# Patient Record
Sex: MALE | Race: WHITE | NOT HISPANIC OR LATINO | Employment: OTHER | URBAN - NONMETROPOLITAN AREA
[De-identification: names, ages, dates, MRNs, and addresses within clinical notes are randomized per-mention and may not be internally consistent; named-entity substitution may affect disease eponyms.]

---

## 2017-01-02 ENCOUNTER — HISTORY (OUTPATIENT)
Dept: INTERNAL MEDICINE | Facility: OTHER | Age: 62
End: 2017-01-02

## 2017-01-02 ENCOUNTER — OFFICE VISIT - GICH (OUTPATIENT)
Dept: INTERNAL MEDICINE | Facility: OTHER | Age: 62
End: 2017-01-02

## 2017-01-02 DIAGNOSIS — R10.9 ABDOMINAL PAIN: ICD-10-CM

## 2017-01-02 LAB
BILIRUB UR QL: NEGATIVE
CLARITY, URINE: CLEAR CLARITY
COLOR UR: YELLOW COLOR
GLUCOSE URINE: NEGATIVE MG/DL
KETONES UR QL: NEGATIVE MG/DL
LEUKOCYTE ESTERASE URINE: NEGATIVE
NITRITE UR QL STRIP: NEGATIVE
OCCULT BLOOD,URINE - HISTORICAL: NEGATIVE
PH UR: 5 [PH]
PROTEIN QUALITATIVE,URINE - HISTORICAL: NEGATIVE MG/DL
SP GR UR STRIP: 1.02
UROBILINOGEN,QUALITATIVE - HISTORICAL: NORMAL EU/DL

## 2017-01-04 ENCOUNTER — HOSPITAL ENCOUNTER (OUTPATIENT)
Dept: RADIOLOGY | Facility: OTHER | Age: 62
End: 2017-01-04
Attending: INTERNAL MEDICINE

## 2017-01-04 DIAGNOSIS — R10.9 ABDOMINAL PAIN: ICD-10-CM

## 2017-01-10 ENCOUNTER — COMMUNICATION - GICH (OUTPATIENT)
Dept: FAMILY MEDICINE | Facility: OTHER | Age: 62
End: 2017-01-10

## 2017-01-10 DIAGNOSIS — N13.9 OBSTRUCTIVE AND REFLUX UROPATHY: ICD-10-CM

## 2017-01-11 ENCOUNTER — HISTORY (OUTPATIENT)
Dept: UROLOGY | Facility: OTHER | Age: 62
End: 2017-01-11

## 2017-01-11 ENCOUNTER — OFFICE VISIT - GICH (OUTPATIENT)
Dept: UROLOGY | Facility: OTHER | Age: 62
End: 2017-01-11

## 2017-01-11 DIAGNOSIS — N13.9 OBSTRUCTIVE AND REFLUX UROPATHY: ICD-10-CM

## 2017-01-25 ENCOUNTER — COMMUNICATION - GICH (OUTPATIENT)
Dept: SURGERY | Facility: OTHER | Age: 62
End: 2017-01-25

## 2017-01-25 ENCOUNTER — HISTORY (OUTPATIENT)
Dept: SURGERY | Facility: OTHER | Age: 62
End: 2017-01-25

## 2017-01-25 ENCOUNTER — OFFICE VISIT - GICH (OUTPATIENT)
Dept: SURGERY | Facility: OTHER | Age: 62
End: 2017-01-25

## 2017-01-25 DIAGNOSIS — K80.10 CALCULUS OF GALLBLADDER WITH CHRONIC CHOLECYSTITIS WITHOUT OBSTRUCTION: ICD-10-CM

## 2017-01-25 LAB
A/G RATIO - HISTORICAL: 1.7 (ref 1–2)
ABSOLUTE BASOPHILS - HISTORICAL: 0.1 THOU/CU MM
ABSOLUTE EOSINOPHILS - HISTORICAL: 0 THOU/CU MM
ABSOLUTE LYMPHOCYTES - HISTORICAL: 2.3 THOU/CU MM (ref 0.9–2.9)
ABSOLUTE MONOCYTES - HISTORICAL: 0.4 THOU/CU MM
ABSOLUTE NEUTROPHILS - HISTORICAL: 1.6 THOU/CU MM (ref 1.7–7)
ALBUMIN SERPL-MCNC: 4.4 G/DL (ref 3.5–5.7)
ALP SERPL-CCNC: 31 IU/L (ref 34–104)
ALT (SGPT) - HISTORICAL: 16 IU/L (ref 7–52)
AMYLASE SERPL-CCNC: 73 IU/L (ref 29–103)
ANION GAP - HISTORICAL: 8 (ref 5–18)
AST SERPL-CCNC: 26 IU/L (ref 13–39)
BASOPHILS # BLD AUTO: 1.5 %
BILIRUB SERPL-MCNC: 0.4 MG/DL (ref 0.3–1)
BUN SERPL-MCNC: 20 MG/DL (ref 7–25)
BUN/CREAT RATIO - HISTORICAL: 27
CALCIUM SERPL-MCNC: 9.9 MG/DL (ref 8.6–10.3)
CHLORIDE SERPLBLD-SCNC: 101 MMOL/L (ref 98–107)
CO2 SERPL-SCNC: 30 MMOL/L (ref 21–31)
CREAT SERPL-MCNC: 0.74 MG/DL (ref 0.7–1.3)
EOSINOPHIL NFR BLD AUTO: 0.4 %
ERYTHROCYTE [DISTWIDTH] IN BLOOD BY AUTOMATED COUNT: 12.3 % (ref 11.5–15.5)
GFR IF NOT AFRICAN AMERICAN - HISTORICAL: >60 ML/MIN/1.73M2
GLOBULIN - HISTORICAL: 2.6 G/DL (ref 2–3.7)
GLUCOSE SERPL-MCNC: 100 MG/DL (ref 70–105)
HCT VFR BLD AUTO: 44 % (ref 37–53)
HEMOGLOBIN: 14.4 G/DL (ref 13.5–17.5)
LYMPHOCYTES NFR BLD AUTO: 53.4 % (ref 20–44)
MCH RBC QN AUTO: 32.5 PG (ref 26–34)
MCHC RBC AUTO-ENTMCNC: 32.7 G/DL (ref 32–36)
MCV RBC AUTO: 99 FL (ref 80–100)
MONOCYTES NFR BLD AUTO: 9.1 %
NEUTROPHILS NFR BLD AUTO: 35.7 % (ref 42–72)
PLATELET # BLD AUTO: 166 THOU/CU MM (ref 140–440)
PMV BLD: 6.1 FL (ref 6.5–11)
POTASSIUM SERPL-SCNC: 4.7 MMOL/L (ref 3.5–5.1)
PROT SERPL-MCNC: 7 G/DL (ref 6.4–8.9)
RED BLOOD COUNT - HISTORICAL: 4.43 MIL/CU MM (ref 4.3–5.9)
SODIUM SERPL-SCNC: 139 MMOL/L (ref 133–143)
WHITE BLOOD COUNT - HISTORICAL: 4.4 THOU/CU MM (ref 4.5–11)

## 2017-01-31 ENCOUNTER — HOSPITAL ENCOUNTER (OUTPATIENT)
Dept: SURGERY | Facility: OTHER | Age: 62
Discharge: HOME | End: 2017-01-31
Attending: SURGERY | Admitting: SURGERY

## 2017-01-31 ENCOUNTER — HISTORY (OUTPATIENT)
Dept: SURGERY | Facility: OTHER | Age: 62
End: 2017-01-31

## 2017-01-31 ENCOUNTER — SURGERY (OUTPATIENT)
Dept: SURGERY | Facility: OTHER | Age: 62
End: 2017-01-31

## 2017-01-31 DIAGNOSIS — K80.10 CALCULUS OF GALLBLADDER WITH CHRONIC CHOLECYSTITIS WITHOUT OBSTRUCTION: ICD-10-CM

## 2017-02-02 ENCOUNTER — COMMUNICATION - GICH (OUTPATIENT)
Dept: SURGERY | Facility: OTHER | Age: 62
End: 2017-02-02

## 2017-02-08 ENCOUNTER — OFFICE VISIT - GICH (OUTPATIENT)
Dept: SURGERY | Facility: OTHER | Age: 62
End: 2017-02-08

## 2017-02-08 DIAGNOSIS — Z98.890 OTHER SPECIFIED POSTPROCEDURAL STATES: ICD-10-CM

## 2017-02-09 ENCOUNTER — COMMUNICATION - GICH (OUTPATIENT)
Dept: FAMILY MEDICINE | Facility: OTHER | Age: 62
End: 2017-02-09

## 2017-02-09 DIAGNOSIS — I10 ESSENTIAL (PRIMARY) HYPERTENSION: ICD-10-CM

## 2017-06-28 ENCOUNTER — OFFICE VISIT - GICH (OUTPATIENT)
Dept: FAMILY MEDICINE | Facility: OTHER | Age: 62
End: 2017-06-28

## 2017-06-28 ENCOUNTER — HISTORY (OUTPATIENT)
Dept: FAMILY MEDICINE | Facility: OTHER | Age: 62
End: 2017-06-28

## 2017-06-28 DIAGNOSIS — Z12.5 ENCOUNTER FOR SCREENING FOR MALIGNANT NEOPLASM OF PROSTATE: ICD-10-CM

## 2017-06-28 DIAGNOSIS — R09.89 OTHER SPECIFIED SYMPTOMS AND SIGNS INVOLVING THE CIRCULATORY AND RESPIRATORY SYSTEMS: ICD-10-CM

## 2017-06-28 DIAGNOSIS — R42 DIZZINESS AND GIDDINESS: ICD-10-CM

## 2017-06-28 LAB
ABSOLUTE BASOPHILS - HISTORICAL: 0 THOU/CU MM
ABSOLUTE EOSINOPHILS - HISTORICAL: 0 THOU/CU MM
ABSOLUTE IMMATURE GRANULOCYTES(METAS,MYELOS,PROS) - HISTORICAL: 0 THOU/CU MM
ABSOLUTE LYMPHOCYTES - HISTORICAL: 2 THOU/CU MM (ref 0.9–2.9)
ABSOLUTE MONOCYTES - HISTORICAL: 0.5 THOU/CU MM
ABSOLUTE NEUTROPHILS - HISTORICAL: 1.6 THOU/CU MM (ref 1.7–7)
ANION GAP - HISTORICAL: 10 (ref 5–18)
BASOPHILS # BLD AUTO: 0.2 %
BUN SERPL-MCNC: 19 MG/DL (ref 7–25)
BUN/CREAT RATIO - HISTORICAL: 27
CALCIUM SERPL-MCNC: 9.9 MG/DL (ref 8.6–10.3)
CHLORIDE SERPLBLD-SCNC: 100 MMOL/L (ref 98–107)
CO2 SERPL-SCNC: 29 MMOL/L (ref 21–31)
CREAT SERPL-MCNC: 0.71 MG/DL (ref 0.7–1.3)
EOSINOPHIL NFR BLD AUTO: 0.2 %
ERYTHROCYTE [DISTWIDTH] IN BLOOD BY AUTOMATED COUNT: 11.8 % (ref 11.5–15.5)
GFR IF NOT AFRICAN AMERICAN - HISTORICAL: >60 ML/MIN/1.73M2
GLUCOSE SERPL-MCNC: 101 MG/DL (ref 70–105)
HCT VFR BLD AUTO: 43.2 % (ref 37–53)
HEMOGLOBIN: 14.6 G/DL (ref 13.5–17.5)
IMMATURE GRANULOCYTES(METAS,MYELOS,PROS) - HISTORICAL: 0.7 %
LYMPHOCYTES NFR BLD AUTO: 48.7 % (ref 20–44)
MCH RBC QN AUTO: 32.8 PG (ref 26–34)
MCHC RBC AUTO-ENTMCNC: 33.8 G/DL (ref 32–36)
MCV RBC AUTO: 97 FL (ref 80–100)
MONOCYTES NFR BLD AUTO: 11.9 %
NEUTROPHILS NFR BLD AUTO: 38.3 % (ref 42–72)
PLATELET # BLD AUTO: 136 THOU/CU MM (ref 140–440)
PMV BLD: 9.5 FL (ref 6.5–11)
POTASSIUM SERPL-SCNC: 4.1 MMOL/L (ref 3.5–5.1)
PSA TOTAL (DIAGNOSTIC) - HISTORICAL: 0.89 NG/ML
RED BLOOD COUNT - HISTORICAL: 4.45 MIL/CU MM (ref 4.3–5.9)
SODIUM SERPL-SCNC: 139 MMOL/L (ref 133–143)
WHITE BLOOD COUNT - HISTORICAL: 4.2 THOU/CU MM (ref 4.5–11)

## 2017-06-28 ASSESSMENT — PATIENT HEALTH QUESTIONNAIRE - PHQ9: SUM OF ALL RESPONSES TO PHQ QUESTIONS 1-9: 0

## 2017-07-17 ENCOUNTER — HISTORY (OUTPATIENT)
Dept: INTERNAL MEDICINE | Facility: OTHER | Age: 62
End: 2017-07-17

## 2017-07-17 ENCOUNTER — OFFICE VISIT - GICH (OUTPATIENT)
Dept: INTERNAL MEDICINE | Facility: OTHER | Age: 62
End: 2017-07-17

## 2017-07-17 DIAGNOSIS — R01.1 CARDIAC MURMUR: ICD-10-CM

## 2017-07-17 DIAGNOSIS — I10 ESSENTIAL (PRIMARY) HYPERTENSION: ICD-10-CM

## 2017-08-02 ENCOUNTER — MEDICAL CORRESPONDENCE (OUTPATIENT)
Facility: CLINIC | Age: 62
End: 2017-08-02
Payer: COMMERCIAL

## 2017-08-02 ENCOUNTER — HOSPITAL ENCOUNTER (OUTPATIENT)
Dept: RADIOLOGY | Facility: OTHER | Age: 62
End: 2017-08-02
Attending: INTERNAL MEDICINE

## 2017-08-02 ENCOUNTER — TRANSFERRED RECORDS (OUTPATIENT)
Dept: HEALTH INFORMATION MANAGEMENT | Facility: CLINIC | Age: 62
End: 2017-08-02

## 2017-08-02 DIAGNOSIS — R01.1 CARDIAC MURMUR: ICD-10-CM

## 2017-08-02 PROCEDURE — 93306 TTE W/DOPPLER COMPLETE: CPT | Mod: 26 | Performed by: INTERNAL MEDICINE

## 2017-10-26 ENCOUNTER — COMMUNICATION - GICH (OUTPATIENT)
Dept: FAMILY MEDICINE | Facility: OTHER | Age: 62
End: 2017-10-26

## 2017-10-27 ENCOUNTER — OFFICE VISIT - GICH (OUTPATIENT)
Dept: INTERNAL MEDICINE | Facility: OTHER | Age: 62
End: 2017-10-27

## 2017-10-27 ENCOUNTER — HISTORY (OUTPATIENT)
Dept: INTERNAL MEDICINE | Facility: OTHER | Age: 62
End: 2017-10-27

## 2017-10-27 DIAGNOSIS — I10 ESSENTIAL (PRIMARY) HYPERTENSION: ICD-10-CM

## 2017-10-27 DIAGNOSIS — R01.1 CARDIAC MURMUR: ICD-10-CM

## 2017-10-27 DIAGNOSIS — R00.2 PALPITATIONS: ICD-10-CM

## 2017-10-27 ASSESSMENT — PATIENT HEALTH QUESTIONNAIRE - PHQ9: SUM OF ALL RESPONSES TO PHQ QUESTIONS 1-9: 0

## 2017-11-13 ENCOUNTER — HISTORY (OUTPATIENT)
Dept: INTERNAL MEDICINE | Facility: OTHER | Age: 62
End: 2017-11-13

## 2017-11-13 ENCOUNTER — OFFICE VISIT - GICH (OUTPATIENT)
Dept: INTERNAL MEDICINE | Facility: OTHER | Age: 62
End: 2017-11-13

## 2017-11-13 DIAGNOSIS — I10 ESSENTIAL (PRIMARY) HYPERTENSION: ICD-10-CM

## 2017-11-13 ASSESSMENT — PATIENT HEALTH QUESTIONNAIRE - PHQ9: SUM OF ALL RESPONSES TO PHQ QUESTIONS 1-9: 0

## 2017-12-19 ENCOUNTER — COMMUNICATION - GICH (OUTPATIENT)
Dept: SURGERY | Facility: OTHER | Age: 62
End: 2017-12-19

## 2017-12-27 NOTE — PROGRESS NOTES
Patient Information     Patient Name MRN Anjel Pitt [id number] Male 1955      Progress Notes by Sanjeev Burns MD at 2017  8:00 AM     Author:  Sanjeev Burns MD Service:  (none) Author Type:  Physician     Filed:  2017  8:25 AM Encounter Date:  2017 Status:  Signed     :  Sanjeev Burns MD (Physician)            SUBJECTIVE:    Anjel Payton is a 62 y.o. male who presents for concerns regarding heart situation.    HPI Comments: This patient is here today to have his heart evaluated. He recently saw Dr. Trivedi and was told that he had a murmur. I reviewed that note today. Dr. Trivedi was concerned about possible thoracic outlet syndrome. The patient is asymptomatic with this. He also was told that he had a heart murmur. He is worried about that because he has a brother who had an aortic valve replacement many years ago. He would like to have this addressed further. He also has treated hypertension and was taken off of his metoprolol. He notes that his pulse will go higher now that he is off the metoprolol. His blood pressure is still well controlled, however.    He is otherwise very healthy and exercises regularly by riding his bike. His previous lipid levels have been normal and the remainder of his risk factor for vascular disease is unremarkable. He has no other complaints or concerns, he just wants to make sure that everything is okay and that there is not something that he needs to monitor going forward with his heart, etc.      No Known Allergies,   Current Outpatient Prescriptions     Medication  Sig     aspirin 81 mg tablet Take 2 tablets by mouth once daily with a meal.     cyanocobalamin (VITAMIN B-12) 500 mcg tablet Take  by mouth once daily.     hydroCHLOROthiazide 12.5 mg tablet Take 1 tablet by mouth once daily.     lisinopril (PRINIVIL; ZESTRIL) 20 mg tablet Take 1 tablet by mouth once daily.     multivitamin (MVI) tablet Take 1 tablet by mouth once daily.      No current facility-administered medications for this visit.      Medications have been reviewed by me and are current to the best of my knowledge and ability. ,   Past Medical History:     Diagnosis  Date     H/O lymph node biopsy     Right supraclavicular lymph node biopsy, benign, age 28      Hypertension    ,   Patient Active Problem List       Diagnosis  Date Noted     Vegetarian  2014     SEBORRHEIC KERATOSIS  2011     COLONOSCOPY, HX OF 2011     BACK PAIN, LUMBAR, CHRONIC       with L5-S1 disc protrusion, moderate and asymmetric to the right with right S1   nerve rootlet displaced posteriorly          HYPERTENSION       well controlled on Zestril 5 mgs,        ,   Past Surgical History:      Procedure  Laterality Date     CARPAL TUNNEL RELEASE      rt       COLONOSCOPY SCREENING  10/5/09    repeat 2019       HERNIA REPAIR  2009    [facility] with mesh       LAP CHOLECYSTECTOMY  2017    Cholecystectomy,Laparoscopic      and   Social History        Substance Use Topics          Smoking status:   Former Smoker      Quit date:  10/25/1975      Smokeless tobacco:   Former User      Quit date:  10/25/1998      Alcohol use   0.5 oz/week      Comment: occasional glass of wine       Family Status     Relation  Status     Mother      Father      Brother Alive     Sister Alive     Brother Alive     Social History     Social History        Marital status:       Spouse name: N/A     Number of children:  N/A     Years of education:  N/A     Social History Main Topics          Smoking status:   Former Smoker      Quit date:  10/25/1975      Smokeless tobacco:   Former User      Quit date:  10/25/1998      Alcohol use   0.5 oz/week      Comment: occasional glass of wine       Drug use:   No      Sexual activity:   Not Asked      Other Topics   Concern      Service  No     Blood Transfusions  No     Caffeine Concern  No     Occupational Exposure  No     Hobby Hazards   No     Sleep Concern  No     Stress Concern  No     Weight Concern  No     Special Diet  Yes     vegetarian       Back Care  No     Exercise  Yes     Bike Helmet  Yes     Seat Belt  Yes     100%      Social History Narrative     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                       REVIEW OF SYSTEMS:  Review of Systems   All other systems reviewed and are negative.      OBJECTIVE:  /78  Pulse 56  Wt 74.3 kg (163 lb 12.8 oz)  BMI 23.17 kg/m2    EXAM:   Physical Exam   Constitutional: He is well-developed, well-nourished, and in no distress. No distress.   Neck: Normal range of motion. Neck supple. Normal carotid pulses and no JVD present. Carotid bruit is not present. No tracheal deviation present. No thyromegaly present.   Cardiovascular: Normal rate and regular rhythm.   No extrasystoles are present.   Murmur heard.   Systolic murmur is present with a grade of 2/6   Right upper sternal border outflow murmur.  No reduction in radial pulse with elevation of the arm. No subclavian bruit was heard.   Pulmonary/Chest: Effort normal and breath sounds normal. No respiratory distress. He has no wheezes. He has no rales. He exhibits no tenderness.   Lymphadenopathy:     He has no cervical adenopathy.   Skin: He is not diaphoretic.   Nursing note and vitals reviewed.      ASSESSMENT/PLAN:    ICD-10-CM    1. Heart murmur R01.1 ECHO COMPLETE WO CONTRAST   2. HYPERTENSION I10         Plan:  Situation was reviewed with the patient. He has a very soft heart murmur which I think is probably just functional and otherwise benign but he is quite anxious about this. Given his family history, it certainly seems reasonable to do an echocardiogram to assess for any valve abnormalities. This will be scheduled and I will let him know the results. I find no other evidence for vascular disease including no evidence for thoracic outlet obstruction. If his echocardiogram  is normal and his blood pressure stays acceptable, no further evaluation or intervention should be required.

## 2017-12-28 NOTE — TELEPHONE ENCOUNTER
"Patient Information     Patient Name MRN Anjel Pitt [id number] Male 1955      Telephone Encounter by Cindy Dhillon RN at 10/26/2017  9:46 AM     Author:  Cindy Dhillon RN Service:  (none) Author Type:  NURS- Registered Nurse     Filed:  10/26/2017  9:52 AM Encounter Date:  10/26/2017 Status:  Signed     :  Cindy Dhillon RN (NURS- Registered Nurse)            Stopped metoprolol this Summer, pulse has been higher than usual. Wife reports  \"eratic\" heartbeat with skipped beats. Patient is not available to triage at this time, but wife will have him call back.    Cindy Dhillon RN........10/26/2017 9:50 AM          "

## 2017-12-28 NOTE — TELEPHONE ENCOUNTER
"Patient Information     Patient Name MRN Anjel Pitt [id number] Male 1955      Telephone Encounter by Cindy Dhillon RN at 10/26/2017 10:05 AM     Author:  Cindy Dhillon RN Service:  (none) Author Type:  NURS- Registered Nurse     Filed:  10/26/2017 10:25 AM Encounter Date:  10/26/2017 Status:  Signed     :  Cindy Dhillon RN (NURS- Registered Nurse)            In clinical absence of patient's primary, Donny Trivedi MD and Sanjeev Burns MD, patient is requesting that this message be sent to the Doc of the Day for consideration please.      Patient reports he had an episode of irregular heartbeat yesterday after riding his bike to [place], [place]. He shares that his heartbeat was 165-170 and skipping beats. He was otherwise asymptomatic. He states the episode lasted about 1 hour. He has no skipped beats today. He had just finished on the elliptical when he called here. He does have a benign murmur, but stress echo was normal. Patient was on metoprolol which was discontinued in July due to bradycardia. He thinks stopping this medication may be contributing to his heart beat being irregular.     He reports HR at rest 50-60's. BP averages 145/80 on lisinopril, HCTZ. He is currently asymptomatic with pulse in the 150's due to exercise.    Has appointment with Sanjeev Burns MD on 2017. Unsure if that is too far away or if he should be seen sooner. Please advise.      Reason for Disposition    [1] Skipped or extra beat(s) AND [2] increases with exercise or exertion    Answer Assessment - Initial Assessment Questions  1. DESCRIPTION: \"Please describe your heart rate or heart beat that you are having\" (e.g., fast/slow, regular/irregular, skipped or extra beats, \"palpitations\")      Feels fine now, now complaints. Yesterday reports pulse was 170 while biking and skipping beats after  2. ONSET: \"When did it start?\" (Minutes, hours or days)       Yesterday afternoon  3. " "DURATION: \"How long does it last\" (e.g., seconds, minutes, hours)      1 hour  4. PATTERN \"Does it come and go, or has it been constant since it started?\"  \"Does it get worse with exertion?\"   \"Are you feeling it now?\"      Only 1 brief episode  5. TAP: \"Using your hand, can you tap out what you are feeling on a chair or table in front of you, so that I can hear?\" (Note: not all patients can do this)          6. HEART RATE: \"Can you tell me your heart rate?\" \"How many beats in 15 seconds?\"  (Note: not all patients can do this)        165-170 when exercising, at rest patient reports pulse in the 50's  7. RECURRENT SYMPTOM: \"Have you ever had this before?\" If so, ask: \"When was the last time?\" and \"What happened that time?\"       no  8. CAUSE: \"What do you think is causing the palpitations?\"      D/c Metoprolol in July, Has benign murmur  9. CARDIAC HISTORY: \"Do you have any history of heart disease?\" (e.g., heart attack, angina, bypass surgery, angioplasty, arrhythmia)       denies  10. OTHER SYMPTOMS: \"Do you have any other symptoms?\" (e.g., dizziness, chest pain, sweating, difficulty breathing)      Denies  11. PREGNANCY: \"Is there any chance you are pregnant?\" \"When was your last menstrual period?\"      n/a    Protocols used: ADULT HEART RATE AND HEART BEAT [id number]          "

## 2017-12-28 NOTE — PROGRESS NOTES
Patient Information     Patient Name MRN Anjel Pitt [id number] Male 1955      Progress Notes by Sanjeev Burns MD at 10/27/2017 10:20 AM     Author:  Sanjeev Burns MD Service:  (none) Author Type:  Physician     Filed:  10/27/2017  9:57 AM Encounter Date:  10/27/2017 Status:  Signed     :  Sanjeev Burns MD (Physician)            SUBJECTIVE:    Anjel Payton is a 62 y.o. male who presents for palpitations.    HPI Comments: He comes in today to discuss her concerns. He called yesterday wondering if it was normal for his heart rate to be higher. His history dates back to this summer when he was taken off of metoprolol. He was just on 25 mg daily but with this he had significant bradycardia with heart rates in the 40s. Off of this medication and just on his lisinopril and hydrochlorothiazide, his blood pressure has been acceptable. He brings in blood pressure readings today to review and they look fine.    He does have a history of a heart murmur. Recent echocardiogram has returned and was normal. He was reassured about that.    He was riding his bike the other day very aggressively and his heart rate went up to 170. He wonders if this could be considered normal. When he was on metoprolol his heart rate would go to about 154. Now he notes that it's frequently at 160 or higher. The other day after exercising he noticed some irregularity to his pulse that lasted just for a short time. He admits that he was probably dehydrated when this occurred. He has not had any further palpitations. He just wants to be reassured that having the slightly higher heart rate off the metoprolol is okay and is expected.      No Known Allergies,   Current Outpatient Prescriptions     Medication  Sig     aspirin 81 mg tablet Take 2 tablets by mouth once daily with a meal.     cyanocobalamin (VITAMIN B-12) 500 mcg tablet Take  by mouth once daily.     hydroCHLOROthiazide 12.5 mg tablet Take 1 tablet by mouth once  daily.     lisinopril (PRINIVIL; ZESTRIL) 20 mg tablet Take 1 tablet by mouth once daily.     multivitamin (MVI) tablet Take 1 tablet by mouth once daily.     No current facility-administered medications for this visit.      Medications have been reviewed by me and are current to the best of my knowledge and ability. ,   Past Medical History:     Diagnosis  Date     H/O lymph node biopsy     Right supraclavicular lymph node biopsy, benign, age 28      Hypertension    ,   Patient Active Problem List       Diagnosis  Date Noted     Vegetarian  01/02/2014     SEBORRHEIC KERATOSIS  11/03/2011     COLONOSCOPY, HX OF 2009 03/17/2011     BACK PAIN, LUMBAR, CHRONIC       with L5-S1 disc protrusion, moderate and asymmetric to the right with right S1   nerve rootlet displaced posteriorly          HYPERTENSION       well controlled on Zestril 5 mgs,        ,   Past Surgical History:      Procedure  Laterality Date     CARPAL TUNNEL RELEASE      rt       COLONOSCOPY SCREENING  10/5/09    repeat 2019       HERNIA REPAIR  2009     with mesh       LAP CHOLECYSTECTOMY  01/31/2017    Cholecystectomy,Laparoscopic      and   Social History        Substance Use Topics          Smoking status:   Former Smoker      Quit date:  10/25/1975      Smokeless tobacco:   Former User      Quit date:  10/25/1998      Alcohol use   0.5 oz/week      Comment: occasional glass of wine         REVIEW OF SYSTEMS:  Review of Systems   Respiratory: Negative for cough, hemoptysis, sputum production, shortness of breath and wheezing.    Cardiovascular: Positive for palpitations. Negative for chest pain, orthopnea, leg swelling and PND.       OBJECTIVE:  /82  Pulse 56  Wt 76.7 kg (169 lb)  BMI 23.91 kg/m2    EXAM:   Physical Exam   Constitutional: He is well-developed, well-nourished, and in no distress. No distress.   Cardiovascular: Normal rate and regular rhythm.   No extrasystoles are present. Exam reveals no S3 and no S4.    Murmur heard.    Systolic murmur is present with a grade of 2/6   Skin: He is not diaphoretic.   Nursing note and vitals reviewed.      ASSESSMENT/PLAN:    ICD-10-CM    1. Palpitations R00.2    2. HYPERTENSION I10    3. Heart murmur R01.1         Plan:  He was reassured. His heart rate is just a little bit higher on average just due to the fact that he is no longer on metoprolol. If however he has further episodes of palpitations or irregular heartbeat, consider cardiac monitor for further evaluation. His blood pressures currently acceptable. He will follow-up or notify us as needed depending on how things progress.

## 2017-12-28 NOTE — PROGRESS NOTES
Patient Information     Patient Name MRN Sex Anjel Crespo [id number] Male 1955      Progress Notes by Sanjeev Burns MD at 2017 11:00 AM     Author:  Sanjeev Burns MD Service:  (none) Author Type:  Physician     Filed:  2017 11:12 AM Encounter Date:  2017 Status:  Signed     :  Sanjeev Burns MD (Physician)            SUBJECTIVE:    Anjel Payton is a 62 y.o. male who presents for recheck on heart concerns.    HPI Comments: He comes today for follow-up on his blood pressure. Basically we had to take him off of his beta blocker because of bradycardia. He does a lot of exercise so he has resting bradycardia anyway. He is now just on the lisinopril as well as the hydrochlorothiazide. His newest bottle of lisinopril included tablets that were larger than his previous prescription so he was taking a half one a day, thinking it was a higher dose. I told him to go back onto whole tablet daily. He has no other complaints or concerns. He's had no ectopy of any consequence or concern.      No Known Allergies,   Current Outpatient Prescriptions     Medication  Sig     aspirin 81 mg tablet Take 2 tablets by mouth once daily with a meal.     cyanocobalamin (VITAMIN B-12) 500 mcg tablet Take  by mouth once daily.     hydroCHLOROthiazide 12.5 mg tablet Take 1 tablet by mouth once daily.     lisinopril (PRINIVIL; ZESTRIL) 20 mg tablet Take 1 tablet by mouth once daily.     multivitamin (MVI) tablet Take 1 tablet by mouth once daily.     No current facility-administered medications for this visit.      Medications have been reviewed by me and are current to the best of my knowledge and ability. ,   Past Medical History:     Diagnosis  Date     H/O lymph node biopsy     Right supraclavicular lymph node biopsy, benign, age 28      Hypertension    ,   Patient Active Problem List       Diagnosis  Date Noted     Vegetarian  2014     SEBORRHEIC KERATOSIS  2011     COLONOSCOPY, HX OF  "2009 03/17/2011     BACK PAIN, LUMBAR, CHRONIC       with L5-S1 disc protrusion, moderate and asymmetric to the right with right S1   nerve rootlet displaced posteriorly          HYPERTENSION       well controlled on Zestril 5 mgs,        ,   Past Surgical History:      Procedure  Laterality Date     CARPAL TUNNEL RELEASE      rt       COLONOSCOPY SCREENING  10/5/09    repeat 2019       HERNIA REPAIR  2009    [facility] with mesh       LAP CHOLECYSTECTOMY  01/31/2017    Cholecystectomy,Laparoscopic      and   Social History        Substance Use Topics          Smoking status:   Former Smoker      Quit date:  10/25/1975      Smokeless tobacco:   Former User      Quit date:  10/25/1998      Alcohol use   0.5 oz/week      Comment: occasional glass of wine         REVIEW OF SYSTEMS:  Review of Systems   All other systems reviewed and are negative.      OBJECTIVE:  /78  Pulse 60  Ht 1.816 m (5' 11.5\")  Wt 81.6 kg (180 lb)  BMI 24.76 kg/m2    EXAM:   Physical Exam   Constitutional: He is well-developed, well-nourished, and in no distress. No distress.   Cardiovascular: Regular rhythm and normal heart sounds.  Bradycardia present.  Exam reveals no gallop and no friction rub.    No murmur heard.  Skin: He is not diaphoretic.   Nursing note and vitals reviewed.    I checked his blood pressure in both arms and got 138/80 on the right, 138/84 on the left  ASSESSMENT/PLAN:    ICD-10-CM    1. HYPERTENSION I10         Plan:  His blood pressure is adequately treated. He is not having any further palpitations of concern. His pulse is acceptable. Continue current medications and follow-up as needed.          "

## 2017-12-28 NOTE — TELEPHONE ENCOUNTER
Patient Information     Patient Name MRN Anjel Pitt [id number] Male 1955      Telephone Encounter by Ryne Godinez MD at 10/26/2017 10:47 AM     Author:  Ryne Godinez MD Service:  (none) Author Type:  Physician     Filed:  10/26/2017 10:48 AM Encounter Date:  10/26/2017 Status:  Signed     :  Ryne Godinez MD (Physician)            Schedule patient with Dr. Burns on Friday 10/27 at 10 AM. -- Follow-up heart palpitations    Ryne Godinez MD

## 2017-12-28 NOTE — TELEPHONE ENCOUNTER
Patient Information     Patient Name MRN Anjel Pitt [id number] Male 1955      Telephone Encounter by Cindy Dhillon RN at 10/26/2017 10:51 AM     Author:  Cindy Dhillon RN Service:  (none) Author Type:  NURS- Registered Nurse     Filed:  10/26/2017 10:52 AM Encounter Date:  10/26/2017 Status:  Signed     :  Cindy Dhillon RN (NURS- Registered Nurse)            Patient's wife was notified and agrees to appointment. Added to Sanjeev Burns MD's schedule.    Cindy Dhillon RN........10/26/2017 10:52 AM

## 2017-12-28 NOTE — PROGRESS NOTES
Patient Information     Patient Name MRN Sex Anjel Crespo [id number] Male 1955      Progress Notes by Donny Trivedi MD at 2017  2:00 PM     Author:  Donny Trivedi MD Service:  (none) Author Type:  Physician     Filed:  2017 12:08 PM Encounter Date:  2017 Status:  Signed     :  Donny Trivedi MD (Physician)            SUBJECTIVE:    Anjel Payton is a 62 y.o. male who presents for sinus pressure    HPI Comments: Patient arrives here because of sinus pressure headache fogginess lightheadedness. He states is been going on for about 2 months. Is not quite sure why. He recently by 50 miles without any difficulty. He denies any chest pain. Occasionally does have a stiff neck. He feels maybe a little better. Patient also requests a PSA done.      No Known Allergies,   Family History       Problem   Relation Age of Onset     Cancer-colon  Mother       colon cancer,  age 60 or 61       Diabetes  Father       post renal transplant secondary to     diabetic complications,       Hypertension  Father      Heart Disease  Father       of myocardial infarction at age 57       Heart Disease  Brother      With bicuspid aortic valve, status post aortic valve replacement     , No birth history on file.,   Current Outpatient Prescriptions on File Prior to Visit       Medication  Sig Dispense Refill     aspirin 81 mg tablet Take 2 tablets by mouth once daily with a meal.  0     cyanocobalamin (VITAMIN B-12) 500 mcg tablet Take  by mouth once daily.  0     hydroCHLOROthiazide 12.5 mg tablet Take 1 tablet by mouth once daily. 90 tablet 2     lisinopril (PRINIVIL; ZESTRIL) 20 mg tablet Take 1 tablet by mouth once daily. 90 tablet 4     metoprolol succinate (TOPROL XL) 25 mg Sustained-Release tablet Take 1 tablet by mouth once daily. 90 tablet 2     multivitamin (MVI) tablet Take 1 tablet by mouth once daily.  0     No current facility-administered medications on file prior to visit.    ,    Past Surgical History:      Procedure  Laterality Date     CARPAL TUNNEL RELEASE      rt       COLONOSCOPY SCREENING  10/5/09    repeat 2019       HERNIA REPAIR  2009    [facility] with mesh       LAP CHOLECYSTECTOMY  01/31/2017    Cholecystectomy,Laparoscopic     ,   Social History        Substance Use Topics          Smoking status:   Former Smoker      Quit date:  10/25/1975      Smokeless tobacco:   Former User      Quit date:  10/25/1998      Alcohol use   0.5 oz/week      Comment: occasional glass of wine      and   Social History        Substance Use Topics          Smoking status:   Former Smoker      Quit date:  10/25/1975      Smokeless tobacco:   Former User      Quit date:  10/25/1998      Alcohol use   0.5 oz/week      Comment: occasional glass of wine         REVIEW OF SYSTEMS:  Review of Systems   Constitutional: Negative for chills, fever and malaise/fatigue.   Eyes: Positive for blurred vision.   Respiratory: Negative for cough.    Cardiovascular: Negative for chest pain.   Gastrointestinal: Negative.    Genitourinary: Negative for dysuria.   Skin: Negative for rash.   Neurological: Positive for dizziness and headaches.   Psychiatric/Behavioral: Negative for depression.       OBJECTIVE:  /68  Pulse (!) 48  Temp 98.2  F (36.8  C) (Temporal)  Wt 81.7 kg (180 lb 3.2 oz)  BMI 25.49 kg/m2    EXAM:   Physical Exam   Constitutional: He is well-developed, well-nourished, and in no distress.   HENT:   Head: Normocephalic.   Cardiovascular: Normal rate, regular rhythm and normal heart sounds.    No murmur heard.  Abdominal: Soft. Bowel sounds are normal.   Genitourinary: Prostate normal and penis normal.   Musculoskeletal:   I can hear a bruit over his left subclavian artery when the arms are raised. The bruit does resolve with the arms coming down. Also note his pulse almost becomes absent with his arms raising up. Very strong with his arms coming down   Neurological: He is alert.   Skin: Skin is warm.    Psychiatric: Affect normal.     Results for orders placed or performed in visit on 06/28/17       BASIC METABOLIC PANEL       Result  Value Ref Range Status    SODIUM 139 133 - 143 mmol/L Final    POTASSIUM 4.1 3.5 - 5.1 mmol/L Final    CHLORIDE 100 98 - 107 mmol/L Final    CO2,TOTAL 29 21 - 31 mmol/L Final    ANION GAP 10 5 - 18                 Final    GLUCOSE 101 70 - 105 mg/dL Final    CALCIUM 9.9 8.6 - 10.3 mg/dL Final    BUN 19 7 - 25 mg/dL Final    CREATININE 0.71 0.70 - 1.30 mg/dL Final    BUN/CREAT RATIO           27                 Final    GFR if African American >60 >60 ml/min/1.73m2 Final    GFR if not African American >60 >60 ml/min/1.73m2 Final   PSA, TOTAL       Result  Value Ref Range Status    PSA TOTAL (DIAGNOSTIC) 0.891 <=3.100 ng/mL Final   CBC WITH AUTO DIFFERENTIAL       Result  Value Ref Range Status    WHITE BLOOD COUNT         4.2 (L) 4.5 - 11.0 thou/cu mm Final    RED BLOOD COUNT           4.45 4.30 - 5.90 mil/cu mm Final    HEMOGLOBIN                14.6 13.5 - 17.5 g/dL Final    HEMATOCRIT                43.2 37.0 - 53.0 % Final    MCV                       97 80 - 100 fL Final    MCH                       32.8 26.0 - 34.0 pg Final    MCHC                      33.8 32.0 - 36.0 g/dL Final    RDW                       11.8 11.5 - 15.5 % Final    PLATELET COUNT            136 (L) 140 - 440 thou/cu mm Final    MPV                       9.5 6.5 - 11.0 fL Final    NEUTROPHILS               38.3 (L) 42.0 - 72.0 % Final    LYMPHOCYTES               48.7 (H) 20.0 - 44.0 % Final    MONOCYTES                 11.9 <12.0 % Final    EOSINOPHILS               0.2 <8.0 % Final    BASOPHILS                 0.2 <3.0 % Final    IMMATURE GRANULOCYTES(METAS,MYELOS,PROS) 0.7 % Final    ABSOLUTE NEUTROPHILS      1.6 (L) 1.7 - 7.0 thou/cu mm Final    ABSOLUTE LYMPHOCYTES      2.0 0.9 - 2.9 thou/cu mm Final    ABSOLUTE MONOCYTES        0.5 <0.9 thou/cu mm Final    ABSOLUTE EOSINOPHILS      0.0 <0.5 thou/cu mm  Final    ABSOLUTE BASOPHILS        0.0 <0.3 thou/cu mm Final    ABSOLUTE IMMATURE GRANULOCYTES(METAS,MYELOS,PROS) 0.0 <=0.3 thou/cu mm Final         ASSESSMENT/PLAN:    ICD-10-CM    1. Lightheadedness R42 CBC WITH DIFFERENTIAL      BASIC METABOLIC PANEL      PSA, TOTAL      CBC WITH DIFFERENTIAL      BASIC METABOLIC PANEL      PSA, TOTAL      CBC WITH AUTO DIFFERENTIAL   2. Screening for prostate cancer Z12.5 PSA TOTAL SCREEN      CANCELED: PSA TOTAL SCREEN   3. Vascular bruit subclavian artery R09.89         Plan:  Labs are unremarkable  possibly viral in origin. He is improving. Fogginess is been going on for 10 days and neuro exam was unremarkable. I've advised the patient to cut his metoprolol off. That's another possibility. Pulse may be beginning to low. He does have a bruit over his left clavicle consistent with temporary restriction to his subclavian artery. Does not have any symptoms going down his leg. No further testing that needs to be done.  The natural history of prostate cancer and ongoing controversy regarding screening and potential treatment outcomes of prostate cancer has been discussed with the patient. The meaning of a false positive PSA and a false negative PSA has been discussed. He indicates understanding of the limitations of this screening test and wishes  to proceed with screening PSA testing.  We'll get back to patient on the PSA returns. Otherwise labs are unremarkable

## 2017-12-30 NOTE — NURSING NOTE
Patient Information     Patient Name MRN Anjel Pitt [id number] Male 1955      Nursing Note by Patt Swift at 2017  2:00 PM     Author:  Patt Swift Service:  (none) Author Type:  (none)     Filed:  2017  1:55 PM Encounter Date:  2017 Status:  Signed     :  Patt Swift            Patient here with multiple concerns sinus pressure, headaches, feels like he is in a fog , pain behind the left eye was in to see , lightheadedness. Went biking 50 miles yesterday and stopped at a friends his blood sugar was 99. Patt Swift LPN .......................2017  1:52 PM

## 2017-12-30 NOTE — NURSING NOTE
Patient Information     Patient Name MRN Anjel Pitt [id number] Male 1955      Nursing Note by Nusrat Little LPN at 10/27/2017 10:20 AM     Author:  Nusrat Little LPN Service:  (none) Author Type:  NURS- Licensed Practical Nurse     Filed:  10/27/2017  9:45 AM Encounter Date:  10/27/2017 Status:  Signed     :  Nusrat Little LPN (NURS- Licensed Practical Nurse)            The patient is here today to be seen for some irregular pulse rates.  Nusrat Little LPN......10/27/2017  9:41 AM

## 2017-12-30 NOTE — NURSING NOTE
Patient Information     Patient Name MRN Anjel Pitt [id number] Male 1955      Nursing Note by Nusrat Little LPN at 2017  8:00 AM     Author:  Nusrat Little LPN Service:  (none) Author Type:  NURS- Licensed Practical Nurse     Filed:  2017  8:06 AM Encounter Date:  2017 Status:  Signed     :  Nusrat Little LPN (NURS- Licensed Practical Nurse)            The patient states he is here today to get a second opinion after seeing his primary doctor about a heart murmur.  Nusrat Little LPN......2017  7:45 AM

## 2017-12-30 NOTE — NURSING NOTE
Patient Information     Patient Name MRN Anjel Pitt [id number] Male 1955      Nursing Note by Nusrat Little LPN at 2017 11:00 AM     Author:  Nusrat Little LPN Service:  (none) Author Type:  NURS- Licensed Practical Nurse     Filed:  2017 10:57 AM Encounter Date:  2017 Status:  Signed     :  Nusrat Little LPN (NURS- Licensed Practical Nurse)            The patient is here today to have a follow up visit.  Nusrat Little LPN......2017  10:49 AM

## 2018-01-02 NOTE — TELEPHONE ENCOUNTER
Patient Information     Patient Name Anjel John [id number] Male 1955      Telephone Encounter by Josie Greenberg at 1/10/2017  8:08 AM     Author:  Josie Greenberg Service:  (none) Author Type:  (none)     Filed:  1/10/2017  8:10 AM Encounter Date:  1/10/2017 Status:  Signed     :  Josie Greenberg            Patient's wife says they received a letter from Dr. Burns stating that they needed to call and schedule with Urology. I do not see a Urology order. Please contact the patient's wife.     Josie Greenberg ....................  1/10/2017   8:09 AM

## 2018-01-02 NOTE — NURSING NOTE
Patient Information     Patient Name MRN Anjel Pitt [id number] Male 1955      Nursing Note by Maira Kilpatrick at 2017  9:00 AM     Author:  Maira Kilpatrick Service:  (none) Author Type:  (none)     Filed:  2017  8:57 AM Encounter Date:  2017 Status:  Signed     :  Maira Kilpatrick            Patient presents today for pain in left side/back. Approximately 2 weeks. Patient states he has a family history of kidney problems.  Maira Kilpatrick LPN ....................  2017   8:41 AM

## 2018-01-02 NOTE — PROGRESS NOTES
Patient Information     Patient Name MRN Sex Anjel Crespo [id number] Male 1955      Progress Notes by Sanjeev Burns MD at 2017  9:00 AM     Author:  Sanjeev Burns MD Service:  (none) Author Type:  Physician     Filed:  2017  9:30 AM Encounter Date:  2017 Status:  Signed     :  Sanjeev Burns MD (Physician)            SUBJECTIVE:    Anjel Payton is a 61 y.o. male who presents for flank/side pain.    HPI Comments: He has pain in his left flank area. It has been present for about 2 weeks. There has been no injury. He has not had a fever or a rash. It's just a dull aching sensation. It was worse last week and it is actually somewhat better today. He has had no abnormal urine findings. Bowels have been normal. He is worried about kidney problems as his father had end-stage kidney disease.      No Known Allergies,   Current Outpatient Prescriptions     Medication  Sig     aspirin 81 mg tablet Take 2 tablets by mouth once daily with a meal.     cyanocobalamin (VITAMIN B-12) 500 mcg tablet Take  by mouth once daily.     hydrochlorothiazide 12.5 mg tablet Take 1 tablet by mouth once daily.     lisinopril (PRINIVIL; ZESTRIL) 20 mg tablet Take 1 tablet by mouth once daily.     metoprolol succinate (TOPROL XL) 25 mg Sustained-Release tablet Take 1 tablet by mouth once daily.     multivitamin (MVI) tablet Take 1 tablet by mouth once daily.     No current facility-administered medications for this visit.      Medications have been reviewed by me and are current to the best of my knowledge and ability. ,   Past Medical History      Diagnosis   Date     H/O lymph node biopsy       Right supraclavicular lymph node biopsy, benign, age 28      Hypertension      and   Patient Active Problem List       Diagnosis  Date Noted     Vegetarian  2014     SEBORRHEIC KERATOSIS  2011     COLONOSCOPY, HX OF 2011     BACK PAIN, LUMBAR, CHRONIC       with L5-S1 disc protrusion, moderate  "and asymmetric to the right with right S1   nerve rootlet displaced posteriorly          HYPERTENSION       well controlled on Zestril 5 mgs,            REVIEW OF SYSTEMS:  Review of Systems   All other systems reviewed and are negative.      OBJECTIVE:  /82  Pulse 54  Temp 98.1  F (36.7  C) (Tympanic)  Ht 1.816 m (5' 11.5\")  Wt 85.5 kg (188 lb 9.6 oz)  BMI 25.94 kg/m2    EXAM:   Physical Exam   Constitutional: He is well-developed, well-nourished, and in no distress. No distress.   Abdominal: Soft. Bowel sounds are normal. He exhibits no distension and no mass. There is no tenderness. There is no rebound and no guarding.   Musculoskeletal:        Back:    Neurological: He is alert.   Skin: Skin is warm and dry. He is not diaphoretic.   Nursing note and vitals reviewed.      ASSESSMENT/PLAN:    ICD-10-CM    1. Flank pain R10.9 URINALYSIS W REFLEX MICROSCOPIC IF POSITIVE        Plan:  Urine today was normal. My suspicion is that this represents musculoskeletal pain. As mentioned, he is worried about his kidneys because of his father's history. Elected to scheduled for a CT with stone protocol and I will let him know the results. He will need to let me know if his pain persists or worsens.          "

## 2018-01-03 ENCOUNTER — COMMUNICATION - GICH (OUTPATIENT)
Dept: FAMILY MEDICINE | Facility: OTHER | Age: 63
End: 2018-01-03

## 2018-01-03 NOTE — PROGRESS NOTES
Patient Information     Patient Name Anjel John [id number] Male 1955      Progress Notes by Brandon Rivera MD at 2017 10:15 AM     Author:  Brandon Rivera MD Service:  (none) Author Type:  Physician     Filed:  2017 10:20 AM Encounter Date:  2017 Status:  Signed     :  Brandon Rivera MD (Physician)            I was asked to see this patient by Sanjeev Burns MD and provide my opinion about the following:  Lower obstructive uropathy    Type of Visit  Consult    Chief Complaint  Lower obstructive uropathy    HPI  Mr. Payton is a 61 y.o. male who presents with recent CT scan revealing an abnormally large bladder..  Patient denies daily bothersome urinary symptoms.  He recently underwent CT scan due to left flank pain revealing a significantly distended bladder.  The CT scan was negative for other abnormal findings.  The pain has improved in the meantime.  He denies dysuria or hematuria.    Constipation   No  Erectile dysfunction  No      Past Medical History  He  has a past medical history of H/O lymph node biopsy and Hypertension.  Patient Active Problem List     Diagnosis  Code     BACK PAIN, LUMBAR, CHRONIC M54.5     HYPERTENSION I10     COLONOSCOPY, HX OF  Z87.19     SEBORRHEIC KERATOSIS L82.1     Vegetarian Z78.9       Past Surgical History  He  has a past surgical history that includes hernia repair; colonoscopy screening (10/5/09); and carpal tunnel release.    Medications  He has a current medication list which includes the following prescription(s): aspirin, cyanocobalamin, hydrochlorothiazide, lisinopril, metoprolol succinate, and multivitamin.    Allergies  No Known Allergies    Social History  He  reports that he quit smoking about 41 years ago. He quit smokeless tobacco use about 18 years ago. He reports that he does not drink alcohol or use illicit drugs.  No drug abuse.    Family History  Family History       Problem   Relation Age of Onset      Cancer-colon  Mother       colon cancer,  age 60 or 61       Diabetes  Father       post renal transplant secondary to     diabetic complications,       Hypertension  Father      Heart Disease  Father       of myocardial infarction at age 57       Heart Disease  Brother      With bicuspid aortic valve, status post aortic valve replacement         Review of Systems  I personally reviewed the ROS with the patient.    Nursing Notes:   Dorys Morgan  2017 10:06 AM  Signed  Here for enlarged bladder.  Post-Void Residual  Verbal order read back from Brandon Rivera MD to perform a post-void residual bladder scan.  A post-void residual was measured by ultrasonic bladder scanner.  11 mL    Review of Systems:    Weight loss:    No     Recent fever/chills:  No   Night sweats:   No  Current skin rash:  No   Recent hair loss:  No  Heat intolerance:  No   Cold intolerance:  No  Chest pain:   No   Palpitations:   No  Shortness of breath:  No   Wheezing:   No  Constipation:    No   Diarrhea:   No   Nausea:   No   Vomiting:   No   Kidney/side pain:  No   Back pain:   No  Frequent headaches:  No   Dizziness:     No  Leg swelling:   No   Calf pain:    No      Parents, brothers or sisters with history of kidney cancer?   No  Parents, brothers or sisters with history of bladder cancer: No    Dorys Morgan LPN  2017  10:02 AM          Physical Exam  Vitals:     17 0959   BP: 138/76   Resp: 12   Weight: 83.5 kg (184 lb)     Constitutional: No acute distress.  Alert and cooperative   Head: NCAT  Eyes: Conjunctivae normal  Cardiovascular: Regular rate.  Pulmonary/Chest: Respirations are even and non-labored bilaterally, no audible wheezing  Abdominal: Soft. No distension, tenderness, masses or guarding.   Neurological: A + O x 3.  Cranial Nerves II-XII grossly intact.  Extremities: MARY x 4, Warm. No clubbing.  No cyanosis.    Skin: Pink, warm and dry.  No visible rashes noted.  Psychiatric:  Normal mood and  affect  Back:  No left CVA tenderness.  No right CVA tenderness.  Genitourinary:  Nonpalpable bladder    Labs  CREATININE (mg/dL)    Date Value   10/26/2016 0.80     PSA TOTAL (DIAGNOSTIC) (ng/mL)    Date Value   10/26/2016 0.970   01/02/2014 1.21     Recent Labs       01/02/17   0914   COLOR  Yellow   CLARITY  Clear   SPECGRAV  1.025   PHURINE  5.0 A   UROBILINOGEN  Normal   PROTEINUA  Negative       Recent Labs       01/02/17   0914   GLUCOSEUA  Negative   KETONESUA  Negative   BLOODUA  Negative   NITRITE  Negative   LEUKOCYTE  Negative       Imaging  CT a/p   1/4/2017  I personally reviewed and interpreted the images and report.  IMPRESSION:    Mild hydroureteronephrosis and marked bladder distention may reflect bladder outlet obstruction related to prostatic hypertrophy.   No evidence of collecting system calculus.    Multiple gallstones identified.      Post-Void Residual  A post-void residual was measured by ultrasonic bladder scanner.  11 mL    Assessment & Plan  Mr. Payton is a 61 y.o. male who presents with concerns regarding an abnormal CT scan.  Reviewed his labs and imaging today.  His bladder is quite distended on the CT scan however he was uncomfortably full the day of the CT scan as he was told to make sure to present with a full bladder.  His PSA is low and he completely empties on bladder scan today.  Given he has no daily chronic bothersome symptoms and the above data I recommended observation and he will follow-up with me as needed.  I did inform him of the multiple gallstones that were present on CT scan and that he should at some point follow up with Dr. Muro to discuss management, particularly if he develops right upper quadrant pain.

## 2018-01-03 NOTE — TELEPHONE ENCOUNTER
Patient Information     Patient Name MRN Anjel Pitt [id number] Male 1955      Telephone Encounter by Cindy Dhillon RN at 2017 10:08 AM     Author:  Cindy Dhillon RN Service:  (none) Author Type:  NURS- Registered Nurse     Filed:  2017 10:10 AM Encounter Date:  2017 Status:  Signed     :  Cindy Dhillon RN (NURS- Registered Nurse)            Pharmacy did not receive last prescription. New prescription sent.    Beta Blockers     Office visit in the past 12 months or per provider note.    Last visit with KOLBY FAITH was on: 10/26/2016 in [facility]  Next visit with KOLBY FAITH is on: No future appointment listed with this provider  Next visit with Family Practice is on: No future appointment listed in this department    Max refill for 12 months from last office visit or per provider note.    Diuretics (may be prescribed for edema)    Office visit in the past 12 months or per provider note.    Last visit with KOLBY FAITH was on: 10/26/2016 in [facility]  Next visit with KOLBY FAITH is on: No future appointment listed with this provider  Next visit with Family [facility] is on: No future appointment listed in this department    Lab testing requirements:  Creatinine and Potassium annually, if ordering lab, order BMP.  CREATININE (mg/dL)    Date Value   2017 0.74     POTASSIUM (mmol/L)    Date Value   2017 4.7     BP Readings from Last 4 Encounters:    17 128/80   17 135/72   17 134/78   17 138/76         Review last provider visit note.  If BP reviewed and plan is noted, can refill.  Max refill for 12 months from last office visit or per provider note.    Prescription refilled per RN Medication Refill Policy.................... Cindy Dhillon RN ....................  2017   10:09 AM

## 2018-01-03 NOTE — TELEPHONE ENCOUNTER
Patient Information     Patient Name MRN Sex Anjel Crespo [id number] Male 1955      Telephone Encounter by Jorge Mcclain MD at 2017 11:48 AM     Author:  Jorge Mcclain MD Service:  (none) Author Type:  Physician     Filed:  2017 11:48 AM Encounter Date:  2017 Status:  Signed     :  Jorge Mcclain MD (Physician)            Very normal

## 2018-01-03 NOTE — TELEPHONE ENCOUNTER
Patient Information     Patient Name MRAnjel Alcocer [id number] Male 1955      Telephone Encounter by Nusrat Little LPN at 1/10/2017  9:02 AM     Author:  Nusrat Little LPN Service:  (none) Author Type:  NURS- Licensed Practical Nurse     Filed:  1/10/2017  9:03 AM Encounter Date:  1/10/2017 Status:  Signed     :  Nusrat Little LPN (NURS- Licensed Practical Nurse)            Contacted the patients wife kaiser. She states that the patient received a results letter suggesting the patient see a urologist. The order is teed up below.  Nusrat Little LPN......1/10/2017  9:03 AM

## 2018-01-03 NOTE — NURSING NOTE
Patient Information     Patient Name MRN Sex Anjel Crespo [id number] Male 1955      Nursing Note by Dorys Morgan at 2017 10:15 AM     Author:  Dorys Morgan Service:  (none) Author Type:  (none)     Filed:  2017 10:06 AM Encounter Date:  2017 Status:  Signed     :  Dorys Morgan            Here for enlarged bladder.  Post-Void Residual  Verbal order read back from Brandon Rivera MD to perform a post-void residual bladder scan.  A post-void residual was measured by ultrasonic bladder scanner.  11 mL    Review of Systems:    Weight loss:    No     Recent fever/chills:  No   Night sweats:   No  Current skin rash:  No   Recent hair loss:  No  Heat intolerance:  No   Cold intolerance:  No  Chest pain:   No   Palpitations:   No  Shortness of breath:  No   Wheezing:   No  Constipation:    No   Diarrhea:   No   Nausea:   No   Vomiting:   No   Kidney/side pain:  No   Back pain:   No  Frequent headaches:  No   Dizziness:     No  Leg swelling:   No   Calf pain:    No      Parents, brothers or sisters with history of kidney cancer?   No  Parents, brothers or sisters with history of bladder cancer: No    Dorys Morgan LPN  2017  10:02 AM

## 2018-01-03 NOTE — OR PREOP
Patient Information     Patient Name MRN Anjel Pitt [id number] Male 1955      OR PreOp by Elise Blair RN at 2017 10:18 AM     Author:  Elise Blair RN Service:  (none) Author Type:  NURS- Registered Nurse     Filed:  2017 10:19 AM Date of Service:  2017 10:18 AM Status:  Signed     :  Elise Blair RN (NURS- Registered Nurse)            Handoff report given to Ann-Marie Santiago RN prior to patient transfer to OR.

## 2018-01-03 NOTE — PROGRESS NOTES
Patient Information     Patient Name MRN Anjel Pitt [id number] Male 1955      Progress Notes by Jorge Mcclain MD at 2017  2:50 PM     Author:  Jorge Mcclain MD Service:  (none) Author Type:  Physician     Filed:  2017  2:57 PM Encounter Date:  2017 Status:  Signed     :  Jorge Mcclain MD (Physician)            Surgical Clinic Consult  Referring physician:  Brandon Rivera MD  Primary physician:     Donny Trivedi MD    Chief complaint:   Gallstones    History of present illness:  This is a 61 y.o. male I am seeing in consultation for gallstones. The patient has some abdominal pain in  and an ultrasound showed gallstones at that time. He recalls that I mentioned gallstones to him at the time of his colonoscopy in . He has had some intermittent back pain. He has had some intermittent upper abdominal discomfort most recently after eating cheese for the holidays. He generally eats a low-fat diet as a vegetarian. He has had cardiac stress tests in the past for chest discomfort. They have been negative. His last EKG was in 2016 and is normal. He exercises daily.     Past medical history:   Past Medical History      Diagnosis   Date     H/O lymph node biopsy       Right supraclavicular lymph node biopsy, benign, age 28      Hypertension         Past surgical history:  Past Surgical History       Procedure   Laterality Date     Hernia repair         with mesh       Colonoscopy screening   10/5/09     repeat 2019       Carpal tunnel release        rt         Current medications:  Current Outpatient Prescriptions       Medication  Sig Dispense Refill     aspirin 81 mg tablet Take 2 tablets by mouth once daily with a meal.  0     cyanocobalamin (VITAMIN B-12) 500 mcg tablet Take  by mouth once daily.  0     hydrochlorothiazide 12.5 mg tablet Take 1 tablet by mouth once daily. 90 tablet 4     lisinopril (PRINIVIL; ZESTRIL) 20 mg tablet Take 1 tablet by mouth once  daily. 90 tablet 4     metoprolol succinate (TOPROL XL) 25 mg Sustained-Release tablet Take 1 tablet by mouth once daily. 90 tablet 4     multivitamin (MVI) tablet Take 1 tablet by mouth once daily.  0     No current facility-administered medications for this visit.      Medications have been reviewed by me and are current to the best of my knowledge and ability.      Allergies:  No Known Allergies    Family history:  Family History       Problem   Relation Age of Onset     Cancer-colon  Mother       colon cancer,  age 60 or 61       Diabetes  Father       post renal transplant secondary to     diabetic complications,       Hypertension  Father      Heart Disease  Father       of myocardial infarction at age 57       Heart Disease  Brother      With bicuspid aortic valve, status post aortic valve replacement         Social history:  Social History     Social History        Marital status:       Spouse name: N/A     Number of children:  N/A     Years of education:  N/A     Occupational History      Not on file.     Social History Main Topics        Smoking status:  Former Smoker     Quit date: 10/25/1975     Smokeless tobacco:  Former User     Quit date: 10/25/1998     Alcohol use  No     Drug use:  No     Sexual activity:  Not on file     Other Topics   Concern      Service  No     Blood Transfusions  No     Caffeine Concern  No     Occupational Exposure  No     Hobby Hazards  No     Sleep Concern  No     Stress Concern  No     Weight Concern  No     Special Diet  Yes     vegetarian       Back Care  No     Exercise  Yes     Bike Helmet  Yes     Seat Belt  Yes     100%      Social History Narrative     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                       Review of systems:  COMPLETE REVIEW OF SYSTEMS: Denies problems  Gastrointestinal: See history of present illness  Cardiovascular: Denies problems  Respiratory: Denies  "problems  Genitourinary denies problems   Musculoskeletal: Cold hands and feet  Skin: Denies problems  Neurologic: Denies problems  Psychiatric: Denies problems  Endocrine: Denies problems  Heme/Lymphatic: Remote history of enlarged lymph nodes  Vascular: Denies problems      Physical exam: /78  Ht 1.791 m (5' 10.5\")  Wt 84.1 kg (185 lb 8 oz)  BMI 26.24 kg/m2      General: this is a pleasant male patient in no acute distress.  Patient is awake alert and oriented x3 .   Respiratory:  Clear without wheezes or crackles  Cardiovascular: Regular rate and rhythm without murmurs  Abdominal: Supraumbilical scar. Soft and nontender. Mild discomfort to palpation right upper quadrant.     Assessment:   Chronic cholecystitis/cholelithiasis    Plan:    Laparoscopic cholecystectomy under general anesthesia as a day surgery procedure.  The patient understands the risks to include bleeding, infection, the potential injury to bowel or bile duct, possible open procedure, possible retained stone, and wishes to proceed.  CBC, liver panel and amylase today.       Jorge Mcclain MD FACS                "

## 2018-01-03 NOTE — NURSING NOTE
Patient Information     Patient Name MRN Anjel Pitt [id number] Male 1955      Nursing Note by Arlen Lee at 2017  1:30 PM     Author:  Arlen Lee Service:  (none) Author Type:  (none)     Filed:  2017  1:05 PM Encounter Date:  2017 Status:  Signed     :  Arlen Lee            Patient comes in for post op gallbladder.  Arlen Lee LPN ....................  2017   1:03 PM

## 2018-01-03 NOTE — INTERVAL H&P NOTE
Patient Information     Patient Name MRN Anjel Pitt [id number] Male 1955      Interval H&P Note by Jorge Mcclain MD at 2017 10:06 AM     Author:  Jorge Mcclain MD Service:  (none) Author Type:  Physician     Filed:  2017 10:06 AM Date of Service:  2017 10:06 AM Status:  Signed     :  Jorge Mcclain MD (Physician)            History and Physical Update    The history and physical has been reviewed and the patient has been examined.  There are no interim changes to the patient's history or physical condition.    Jorge Mcclain MD        Source Note     Author:  Jorge Mcclain MD Service:  (none) Author Type:  Physician    Filed:  2017  2:57 PM Date of Service:  2017  2:50 PM Status:  Signed    :  Jorge Mcclain MD (Physician)              Surgical Clinic Consult  Referring physician:  Brandon Rivera MD  Primary physician:     Donny Trivedi MD    Chief complaint:   Gallstones    History of present illness:  This is a 61 y.o. male I am seeing in consultation for gallstones. The patient has some abdominal pain in  and an ultrasound showed gallstones at that time. He recalls that I mentioned gallstones to him at the time of his colonoscopy in . He has had some intermittent back pain. He has had some intermittent upper abdominal discomfort most recently after eating cheese for the holidays. He generally eats a low-fat diet as a vegetarian. He has had cardiac stress tests in the past for chest discomfort. They have been negative. His last EKG was in 2016 and is normal. He exercises daily.     Past medical history:   Past Medical History      Diagnosis   Date     H/O lymph node biopsy       Right supraclavicular lymph node biopsy, benign, age 28      Hypertension         Past surgical history:  Past Surgical History       Procedure   Laterality Date     Hernia repair         with mesh       Colonoscopy screening   10/5/09     repeat 2019       Carpal  tunnel release        rt         Current medications:  Current Outpatient Prescriptions       Medication  Sig Dispense Refill     aspirin 81 mg tablet Take 2 tablets by mouth once daily with a meal.  0     cyanocobalamin (VITAMIN B-12) 500 mcg tablet Take  by mouth once daily.  0     hydrochlorothiazide 12.5 mg tablet Take 1 tablet by mouth once daily. 90 tablet 4     lisinopril (PRINIVIL; ZESTRIL) 20 mg tablet Take 1 tablet by mouth once daily. 90 tablet 4     metoprolol succinate (TOPROL XL) 25 mg Sustained-Release tablet Take 1 tablet by mouth once daily. 90 tablet 4     multivitamin (MVI) tablet Take 1 tablet by mouth once daily.  0     No current facility-administered medications for this visit.      Medications have been reviewed by me and are current to the best of my knowledge and ability.      Allergies:  No Known Allergies    Family history:  Family History       Problem   Relation Age of Onset     Cancer-colon  Mother       colon cancer,  age 60 or 61       Diabetes  Father       post renal transplant secondary to     diabetic complications,       Hypertension  Father      Heart Disease  Father       of myocardial infarction at age 57       Heart Disease  Brother      With bicuspid aortic valve, status post aortic valve replacement         Social history:  Social History     Social History        Marital status:       Spouse name: N/A     Number of children:  N/A     Years of education:  N/A     Occupational History      Not on file.     Social History Main Topics        Smoking status:  Former Smoker     Quit date: 10/25/1975     Smokeless tobacco:  Former User     Quit date: 10/25/1998     Alcohol use  No     Drug use:  No     Sexual activity:  Not on file     Other Topics   Concern      Service  No     Blood Transfusions  No     Caffeine Concern  No     Occupational Exposure  No     Hobby Hazards  No     Sleep Concern  No     Stress Concern  No     Weight Concern  No     Special  "Diet  Yes     vegetarian       Back Care  No     Exercise  Yes     Bike Helmet  Yes     Seat Belt  Yes     100%      Social History Narrative     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                       Review of systems:  COMPLETE REVIEW OF SYSTEMS: Denies problems  Gastrointestinal: See history of present illness  Cardiovascular: Denies problems  Respiratory: Denies problems  Genitourinary denies problems   Musculoskeletal: Cold hands and feet  Skin: Denies problems  Neurologic: Denies problems  Psychiatric: Denies problems  Endocrine: Denies problems  Heme/Lymphatic: Remote history of enlarged lymph nodes  Vascular: Denies problems      Physical exam: /78  Ht 1.791 m (5' 10.5\")  Wt 84.1 kg (185 lb 8 oz)  BMI 26.24 kg/m2      General: this is a pleasant male patient in no acute distress.  Patient is awake alert and oriented x3 .   Respiratory:  Clear without wheezes or crackles  Cardiovascular: Regular rate and rhythm without murmurs  Abdominal: Supraumbilical scar. Soft and nontender. Mild discomfort to palpation right upper quadrant.     Assessment:   Chronic cholecystitis/cholelithiasis    Plan:    Laparoscopic cholecystectomy under general anesthesia as a day surgery procedure.  The patient understands the risks to include bleeding, infection, the potential injury to bowel or bile duct, possible open procedure, possible retained stone, and wishes to proceed.  CBC, liver panel and amylase today.       Jorge Mcclain MD FACS                         "

## 2018-01-03 NOTE — TELEPHONE ENCOUNTER
Patient Information     Patient Name MRN Anjel Pitt [id number] Male 1955      Telephone Encounter by Arlen Lee at 2017  4:43 PM     Author:  Arlen Lee Service:  (none) Author Type:  (none)     Filed:  2017  4:43 PM Encounter Date:  2017 Status:  Signed     :  Arlen Lee            Patient notified of lab results.  Arlen Lee LPN ....................  2017   4:43 PM

## 2018-01-03 NOTE — H&P
Patient Information     Patient Name MRN Anjel Pitt [id number] Male 1955      H&P by Jorge Mcclain MD at 2017  2:50 PM     Author:  Jorge Mcclain MD Service:  (none) Author Type:  Physician     Filed:  2017  2:57 PM Encounter Date:  2017 Status:  Signed     :  Jorge Mcclain MD (Physician)            Surgical Clinic Consult  Referring physician:  Brandon Rivera MD  Primary physician:     Donny Trivedi MD    Chief complaint:   Gallstones    History of present illness:  This is a 61 y.o. male I am seeing in consultation for gallstones. The patient has some abdominal pain in  and an ultrasound showed gallstones at that time. He recalls that I mentioned gallstones to him at the time of his colonoscopy in . He has had some intermittent back pain. He has had some intermittent upper abdominal discomfort most recently after eating cheese for the holidays. He generally eats a low-fat diet as a vegetarian. He has had cardiac stress tests in the past for chest discomfort. They have been negative. His last EKG was in 2016 and is normal. He exercises daily.     Past medical history:   Past Medical History      Diagnosis   Date     H/O lymph node biopsy       Right supraclavicular lymph node biopsy, benign, age 28      Hypertension         Past surgical history:  Past Surgical History       Procedure   Laterality Date     Hernia repair         with mesh       Colonoscopy screening   10/5/09     repeat 2019       Carpal tunnel release        rt         Current medications:  Current Outpatient Prescriptions       Medication  Sig Dispense Refill     aspirin 81 mg tablet Take 2 tablets by mouth once daily with a meal.  0     cyanocobalamin (VITAMIN B-12) 500 mcg tablet Take  by mouth once daily.  0     hydrochlorothiazide 12.5 mg tablet Take 1 tablet by mouth once daily. 90 tablet 4     lisinopril (PRINIVIL; ZESTRIL) 20 mg tablet Take 1 tablet by mouth once daily. 90 tablet  4     metoprolol succinate (TOPROL XL) 25 mg Sustained-Release tablet Take 1 tablet by mouth once daily. 90 tablet 4     multivitamin (MVI) tablet Take 1 tablet by mouth once daily.  0     No current facility-administered medications for this visit.      Medications have been reviewed by me and are current to the best of my knowledge and ability.      Allergies:  No Known Allergies    Family history:  Family History       Problem   Relation Age of Onset     Cancer-colon  Mother       colon cancer,  age 60 or 61       Diabetes  Father       post renal transplant secondary to     diabetic complications,       Hypertension  Father      Heart Disease  Father       of myocardial infarction at age 57       Heart Disease  Brother      With bicuspid aortic valve, status post aortic valve replacement         Social history:  Social History     Social History        Marital status:       Spouse name: N/A     Number of children:  N/A     Years of education:  N/A     Occupational History      Not on file.     Social History Main Topics        Smoking status:  Former Smoker     Quit date: 10/25/1975     Smokeless tobacco:  Former User     Quit date: 10/25/1998     Alcohol use  No     Drug use:  No     Sexual activity:  Not on file     Other Topics   Concern      Service  No     Blood Transfusions  No     Caffeine Concern  No     Occupational Exposure  No     Hobby Hazards  No     Sleep Concern  No     Stress Concern  No     Weight Concern  No     Special Diet  Yes     vegetarian       Back Care  No     Exercise  Yes     Bike Helmet  Yes     Seat Belt  Yes     100%      Social History Narrative     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                       Review of systems:  COMPLETE REVIEW OF SYSTEMS: Denies problems  Gastrointestinal: See history of present illness  Cardiovascular: Denies problems  Respiratory: Denies problems  Genitourinary  "denies problems   Musculoskeletal: Cold hands and feet  Skin: Denies problems  Neurologic: Denies problems  Psychiatric: Denies problems  Endocrine: Denies problems  Heme/Lymphatic: Remote history of enlarged lymph nodes  Vascular: Denies problems      Physical exam: /78  Ht 1.791 m (5' 10.5\")  Wt 84.1 kg (185 lb 8 oz)  BMI 26.24 kg/m2      General: this is a pleasant male patient in no acute distress.  Patient is awake alert and oriented x3 .   Respiratory:  Clear without wheezes or crackles  Cardiovascular: Regular rate and rhythm without murmurs  Abdominal: Supraumbilical scar. Soft and nontender. Mild discomfort to palpation right upper quadrant.     Assessment:   Chronic cholecystitis/cholelithiasis    Plan:    Laparoscopic cholecystectomy under general anesthesia as a day surgery procedure.  The patient understands the risks to include bleeding, infection, the potential injury to bowel or bile duct, possible open procedure, possible retained stone, and wishes to proceed.  CBC, liver panel and amylase today.       Jorge Mcclain MD FACS                  "

## 2018-01-03 NOTE — OR POSTOP
Patient Information     Patient Name MRN Sex Anjel Crespo [id number] Male 1955      OR PostOp by Ana Maria Foley RN at 2017  1:27 PM     Author:  Ana Maria Foley RN Service:  (none) Author Type:  NURS- Registered Nurse     Filed:  2017  1:31 PM Date of Service:  2017  1:27 PM Status:  Signed     :  Ana Maria Foley RN (NURS- Registered Nurse)            Discharge Note    Data:  Anjel Payton has been discharged home at 1327 via ambulatory accompanied by Registered Nurse.      Action:  Written discharge/follow-up instructions were provided to patient and Spouse. Prescriptions were filled and sent with patient.  Belongings sent with patient. Medications from home sent with patient/family: Not Applicable  Equipment none .     Response:  Patient verbalized understanding of discharge instructions, reason for discharge, and necessary follow-up appointments.     Ana Maria Foley RN

## 2018-01-03 NOTE — PROGRESS NOTES
Patient Information     Patient Name MRN Anjel Pitt [id number] Male 1955      Progress Notes by Jorge Mcclain MD at 2017  1:30 PM     Author:  Jorge Mcclain MD Service:  (none) Author Type:  Physician     Filed:  2017  1:29 PM Encounter Date:  2017 Status:  Signed     :  Jorge Mcclain MD (Physician)            Subjective:  This is a follow up after laparoscopic cholecystectomy for chronic cholecystitis/cholelithiasis on 17. The patient has no complaints.  He is eating well and moving his bowels regularly. His back pain is improved. He has questions about possible inguinal hernias.    Objective: /80  Temp 98.5  F (36.9  C) (Tympanic)  Wt 82.6 kg (182 lb)  BMI 25.75 kg/m2  The incisions are healing well without erythema. Do not appreciate any bulges in the inguinal areas, some laxity on the left.    Assessment:   Doing well after laparoscopic cholecystectomy    Plan:  Follow-up as needed

## 2018-01-03 NOTE — OR ANESTHESIA
"Patient Information     Patient Name MRN Sex Anjel Crespo [id number] Male 1955      OR Anesthesia by Damien Ba DO at 2017  9:07 AM     Author:  Damien Ba DO Service:  (none) Author Type:  PHYS- Anesthesiologist     Filed:  2017  9:07 AM Date of Service:  2017  9:07 AM Status:  Signed     :  Damien Ba DO (PHYS- Anesthesiologist)            ANESTHESIAPREOP    PREANESTHETIC EXAM    Anjel Payton is a 61 y.o. male    Visit Vitals       Ht 1.791 m (5' 10.5\")     Wt 84.1 kg (185 lb 8 oz)     BMI 26.24 kg/m2     Body mass index is 26.24 kg/(m^2).    ALLERGIES    Review of patient's allergies indicates no known allergies.    PAST MEDICAL HISTORY    Past Medical History      Diagnosis   Date     H/O lymph node biopsy       Right supraclavicular lymph node biopsy, benign, age 28      Hypertension         Patient Active Problem List     Diagnosis  Code     BACK PAIN, LUMBAR, CHRONIC M54.5     HYPERTENSION I10     COLONOSCOPY, HX OF  Z87.19     SEBORRHEIC KERATOSIS L82.1     Vegetarian Z78.9     Chronic cholecystitis with calculus K80.10       Family History       Problem   Relation Age of Onset     Cancer-colon  Mother       colon cancer,  age 60 or 61       Diabetes  Father       post renal transplant secondary to     diabetic complications,       Hypertension  Father      Heart Disease  Father       of myocardial infarction at age 57       Heart Disease  Brother      With bicuspid aortic valve, status post aortic valve replacement         Past Surgical History       Procedure   Laterality Date     Hernia repair         with mesh       Colonoscopy screening   10/5/09     repeat 2019       Carpal tunnel release        rt         Major Anesthetic Reactions: none    PMH/PSH Reviewed    History     Smoking Status       Former Smoker      Quit date: 10/25/1975   Smokeless Tobacco       Former User      Quit date: 10/25/1998     History    Alcohol Use No "       Medications have been reviewed in coordination with proposed intra-procedure medications.    Prescriptions Prior to Admission       Medication  Sig Dispense Refill     aspirin 81 mg tablet Take 2 tablets by mouth once daily with a meal.  0     cyanocobalamin (VITAMIN B-12) 500 mcg tablet Take  by mouth once daily.  0     hydrochlorothiazide 12.5 mg tablet Take 1 tablet by mouth once daily. 90 tablet 4     lisinopril (PRINIVIL; ZESTRIL) 20 mg tablet Take 1 tablet by mouth once daily. 90 tablet 4     metoprolol succinate (TOPROL XL) 25 mg Sustained-Release tablet Take 1 tablet by mouth once daily. 90 tablet 4     multivitamin (MVI) tablet Take 1 tablet by mouth once daily.  0       Recent Labs  No results found for this visit on 01/31/17.    NPO Status Noted:  Yes    Airway Class:  1    ASA Physical Status: 2    Anesthetic Plan: GA/ ETT    The risks, benefits, and alternatives of the procedure were discussed.    PHYSICIAN ELECTRONIC SIGNATURE  Macario Ba DO

## 2018-01-03 NOTE — H&P
Patient Information     Patient Name MRN Anjel Pitt [id number] Male 1955      H&P (View-Only) by Jorge Mcclain MD at 2017  2:50 PM     Author:  Jorge Mcclain MD Service:  (none) Author Type:  Physician     Filed:  2017  2:57 PM Date of Service:  2017  2:50 PM Status:  Signed     :  Jorge Mcclain MD (Physician)            [facility] Clinic Consult  Referring physician:  Brandon Rivera MD  Primary physician:     Donny Trivedi MD    Chief complaint:   Gallstones    History of present illness:  This is a 61 y.o. male I am seeing in consultation for gallstones. The patient has some abdominal pain in  and an ultrasound showed gallstones at that time. He recalls that I mentioned gallstones to him at the time of his colonoscopy in . He has had some intermittent back pain. He has had some intermittent upper abdominal discomfort most recently after eating cheese for the holidays. He generally eats a low-fat diet as a vegetarian. He has had cardiac stress tests in the past for chest discomfort. They have been negative. His last EKG was in 2016 and is normal. He exercises daily.     Past medical history:   Past Medical History      Diagnosis   Date     H/O lymph node biopsy       Right supraclavicular lymph node biopsy, benign, age 28      Hypertension         Past surgical history:  Past Surgical History       Procedure   Laterality Date     Hernia repair         with mesh       Colonoscopy screening   10/5/09     repeat 2019       Carpal tunnel release        rt         Current medications:  Current Outpatient Prescriptions       Medication  Sig Dispense Refill     aspirin 81 mg tablet Take 2 tablets by mouth once daily with a meal.  0     cyanocobalamin (VITAMIN B-12) 500 mcg tablet Take  by mouth once daily.  0     hydrochlorothiazide 12.5 mg tablet Take 1 tablet by mouth once daily. 90 tablet 4     lisinopril (PRINIVIL; ZESTRIL) 20 mg tablet Take 1 tablet by mouth  once daily. 90 tablet 4     metoprolol succinate (TOPROL XL) 25 mg Sustained-Release tablet Take 1 tablet by mouth once daily. 90 tablet 4     multivitamin (MVI) tablet Take 1 tablet by mouth once daily.  0     No current facility-administered medications for this visit.      Medications have been reviewed by me and are current to the best of my knowledge and ability.      Allergies:  No Known Allergies    Family history:  Family History       Problem   Relation Age of Onset     Cancer-colon  Mother       colon cancer,  age 60 or 61       Diabetes  Father       post renal transplant secondary to     diabetic complications,       Hypertension  Father      Heart Disease  Father       of myocardial infarction at age 57       Heart Disease  Brother      With bicuspid aortic valve, status post aortic valve replacement         Social history:  Social History     Social History        Marital status:       Spouse name: N/A     Number of children:  N/A     Years of education:  N/A     Occupational History      Not on file.     Social History Main Topics        Smoking status:  Former Smoker     Quit date: 10/25/1975     Smokeless tobacco:  Former User     Quit date: 10/25/1998     Alcohol use  No     Drug use:  No     Sexual activity:  Not on file     Other Topics   Concern      Service  No     Blood Transfusions  No     Caffeine Concern  No     Occupational Exposure  No     Hobby Hazards  No     Sleep Concern  No     Stress Concern  No     Weight Concern  No     Special Diet  Yes     vegetarian       Back Care  No     Exercise  Yes     Bike Helmet  Yes     Seat Belt  Yes     100%      Social History Narrative     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                       Review of systems:  COMPLETE REVIEW OF SYSTEMS: Denies problems  Gastrointestinal: See history of present illness  Cardiovascular: Denies problems  Respiratory: Denies  "problems  Genitourinary denies problems   Musculoskeletal: Cold hands and feet  Skin: Denies problems  Neurologic: Denies problems  Psychiatric: Denies problems  Endocrine: Denies problems  Heme/Lymphatic: Remote history of enlarged lymph nodes  Vascular: Denies problems      Physical exam: /78  Ht 1.791 m (5' 10.5\")  Wt 84.1 kg (185 lb 8 oz)  BMI 26.24 kg/m2      General: this is a pleasant male patient in no acute distress.  Patient is awake alert and oriented x3 .   Respiratory:  Clear without wheezes or crackles  Cardiovascular: Regular rate and rhythm without murmurs  Abdominal: Supraumbilical scar. Soft and nontender. Mild discomfort to palpation right upper quadrant.     Assessment:   Chronic cholecystitis/cholelithiasis    Plan:    Laparoscopic cholecystectomy under general anesthesia as a day surgery procedure.  The patient understands the risks to include bleeding, infection, the potential injury to bowel or bile duct, possible open procedure, possible retained stone, and wishes to proceed.  CBC, liver panel and amylase today.       Jorge Mcclain MD FACS                  "

## 2018-01-03 NOTE — OR ANESTHESIA
Patient Information     Patient Name MRN Sex Anjel Crespo [id number] Male 1955      OR Anesthesia by Damien Ba DO at 2017  2:12 PM     Author:  Damien Ba DO Service:  (none) Author Type:  PHYS- Anesthesiologist     Filed:  2017  2:12 PM Date of Service:  2017  2:12 PM Status:  Signed     :  Damien Ba DO (PHYS- Anesthesiologist)            Anesthesia Post Operative Care Note    Name: Anjel Payton  MRN:   [id number]  :    1955       Procedure Done:  See Surgeon Note        Anesthesia Technique    Anesthetic Type:  General     Airway Management:  ET Tube     Oral Trauma:  No    Intraoperative Course   Hemodynamics:  Stable    Ventilation Normal:  Yes Lung Sounds:  Normal      PACU Course    Airway Status:  Extubated     Nondepolarizer Used:       Reversed: N/A   Hemodynamics:  Stable      Hydration: Euvolemic   Temperature:  36.1 - 38.3      Mental Status:  Awake, alert, follows commands   Pain Management:  Adequate   Regional Block:  No   Anesthesia Complications:  None      Vital Signs:  Temp: 98  F (36.7  C)  Pulse: 58  BP: 135/72  Resp: 16  SpO2: (!) 88 %    O2 Device: Room Air         Level of Nausea: None        Active Lines:  Patient Lines/Drains/Airways Status    Active Line     None                Intake & Output:  Date  17 - 17 0659(Not Admitted)    17 - 17 0659(Discharged)      Shift  0028-0219 0828-0039 8787-5522 24 Hour Total 4729-6259 6901-4825 0610-9123 24 Hour Total   I  N  T  A  K  E   Oral/Enteral     360   360       +I/O+    Oral     360   360    Intravenous     2000       +I/O+  Maint IV (lactated ringers infusion)     2000    Shift Total     2360   2360   O  U  T  P  U  T   Shift Total           NET      2360   2360   Weight (kg)  84.1 84.1 84.1 84.1 84.1 84.1 84.1 84.1         Labs:  No results for input(s): XD4TWFORNOY, IDG5WOGYUKZL, PHARTERIAL, VYT6HGNVZEJS, B8QVKLFVITUF in the last  24 hours.    No results for input(s): MAGNESIUM in the last 24 hours.    No results for input(s): GLUCOSEMETER in the last 720 hours.        Damien Ba DO ....................  1/31/2017   2:12 PM

## 2018-01-03 NOTE — OR POSTOP
Patient Information     Patient Name MRN Sex Anjel Crespo [id number] Male 1955      OR PostOp by Randi Leonard RN at 2017 12:15 PM     Author:  Randi Leonard RN Service:  (none) Author Type:  NURS- Registered Nurse     Filed:  2017 12:17 PM Date of Service:  2017 12:15 PM Status:  Signed     :  Randi Leonard RN (NURS- Registered Nurse)            PACU Transfer Note    Anjel Payton transferred to DSU via cart.  Equipment used for transport:  Oxygen, Nasal Cannula.  Accompanied by:  Registered Nurse. Report given to Ana Maria WADE.    Patient stable and meets phase 1 discharge criteria for transport from PACU.    PACU Respiratory Event Documentation     1) Episodes of Apnea greater than or equal to 10 seconds: no     2) Bradypnea - less than 8 breaths per minute: no    3) Pain score on 0 to 10 scale: 5    4) Pain-sedation mismatch (yes or no): yes    5) Repeated 02 desaturation less than 90% (yes or no): yes    Anesthesia notified? (yes or no): no, patient placed on O2 NC @ 2 L    Any of the above events occuring repeatedly in separate 30 minute intervals may be considered recurrent PACU respiratory events.

## 2018-01-03 NOTE — NURSING NOTE
Patient Information     Patient Name MRN Anjel Pitt [id number] Male 1955      Nursing Note by Zayra Loo at 2017  2:50 PM     Author:  Zayra Loo Service:  (none) Author Type:  (none)     Filed:  2017  2:26 PM Encounter Date:  2017 Status:  Signed     :  Zayra Loo            Here today in consultation for some gallstones.  Zayra Loo LPN..........2017  2:23 PM

## 2018-01-03 NOTE — TELEPHONE ENCOUNTER
Patient Information     Patient Name MRN Sex Anjel Crespo [id number] Male 1955      Telephone Encounter by Zayra Loo at 2017 10:19 AM     Author:  Zayra Loo Service:  (none) Author Type:  (none)     Filed:  2017 10:24 AM Encounter Date:  2017 Status:  Signed     :  Zayra Loo            Laparoscopic cholecystectomy on 17.  Concerns with the larger incision in the middle.  Slightly swollen but not red and no drainage no fever.  Wonders if this is normal?  Zayra Loo LPN..........2017  10:23 AM

## 2018-01-03 NOTE — OR SURGEON
Patient Information     Patient Name MRN Sex Anjel Crespo [id number] Male 1955      OR Surgeon by Jorge Mcclain MD at 2017 11:21 AM     Author:  Jorge Mcclain MD Service:  (none) Author Type:  Physician     Filed:  2017 11:26 AM Date of Service:  2017 11:21 AM Status:  Signed     :  Jorge Mcclain MD (Physician)            PREOPERATIVE  DIAGNOSIS:  Chronic cholecystitis/cholelithiasis.       POSTOPERATIVE DIAGNOSIS:  Chronic cholecystitis/cholelithiasis.      PROCEDURE PERFORMED:  Laparoscopic cholecystectomy.    SURGEON:  Jorge Mcclain MD FACS    ASSISTANT: KIKI Siddiqi MSIII    ANESTHESIA:  General,Dr Mejia Medley  FAMILY PHYSICIAN: Donny Trivedi MD    REFERRING PROVIDER:  Dr Rivera    INDICATION FOR THE PROCEDURE:  The patient is a 61 y.o. malewith a  history of right upper quadrant abdominal pain radiating to the back and the shoulder.  The patient's US/CT shows gallstones.  His liver functions were within normal limits.     PROCEDURE:  After adequate general anesthesia, the patient was prepped and draped in the usual sterile fashion.  An epigastric 5 mm incision was made and the abdomen entered using the Visiport technique.  The abdomen was insufflated with carbon dioxide to a pressure of 15 mmHg.  Under direct vision, a 5 mm infraumbilical and two 5 mm right-sided ports were placed and epigastric port changed to an 11 mm port.  The initial port site was inspected and was unremarkable.  The gallbladder was then grasped and held on traction.    Dissection was carried down to  Lawanda s pouch .  The cystic duct was then able to be visualized.  That was dissected free circumferentially, triply clipped and divided.  Adjacent to that was the cystic artery and that was dissected free circumferentially, doubly clipped and divided.  The gallbladder was then taken out of the hepatic bed using the hook cautery and maintaining good hemostasis throughout.  There was  spillage of  bile  .  The gallbladder was placed in an [place] pouch and removed through the epigastric port site with some dilation.  There was no spillage.  The right upper quadrant was irrigated with 2 liters of normal saline.  There was good hemostasis.  [place] solution was irrigated over the liver.  The three 5 mm ports were removed under direct vision.  There was good hemostasis.   The abdomen was deflated and the epigastric port removed.  The epigastric fascia defect was closed with an 0 Vicryl suture.  The wound was infiltrated with [place] solution, the dermis approximated with 3-0 Vicryl sutures and the skin closed with 5-0 Maxon intracuticular suture.  Proxi-Strips and clean, dry dressings were applied.  The patient was taken to the recovery room in stable condition.      Jorge Mcclain MD FACS

## 2018-01-03 NOTE — TELEPHONE ENCOUNTER
Patient Information     Patient Name MRN Sex Anjel Crespo [id number] Male 1955      Telephone Encounter by Zayra Loo at 2017 12:40 PM     Author:  Zayra Loo Service:  (none) Author Type:  (none)     Filed:  2017 12:40 PM Encounter Date:  2017 Status:  Signed     :  Zayra Loo            Notified.  Zayra Loo LPN..........2017  12:40 PM

## 2018-01-08 ENCOUNTER — HISTORY (OUTPATIENT)
Dept: INTERNAL MEDICINE | Facility: OTHER | Age: 63
End: 2018-01-08

## 2018-01-08 ENCOUNTER — OFFICE VISIT - GICH (OUTPATIENT)
Dept: INTERNAL MEDICINE | Facility: OTHER | Age: 63
End: 2018-01-08

## 2018-01-08 DIAGNOSIS — R00.0 TACHYCARDIA: ICD-10-CM

## 2018-01-08 DIAGNOSIS — I10 ESSENTIAL (PRIMARY) HYPERTENSION: ICD-10-CM

## 2018-01-11 ENCOUNTER — COMMUNICATION - GICH (OUTPATIENT)
Dept: FAMILY MEDICINE | Facility: OTHER | Age: 63
End: 2018-01-11

## 2018-01-11 ENCOUNTER — HOSPITAL ENCOUNTER (OUTPATIENT)
Dept: RESPIRATORY THERAPY | Facility: OTHER | Age: 63
End: 2018-01-11
Attending: INTERNAL MEDICINE

## 2018-01-11 DIAGNOSIS — I10 ESSENTIAL (PRIMARY) HYPERTENSION: ICD-10-CM

## 2018-01-11 DIAGNOSIS — R00.0 TACHYCARDIA: ICD-10-CM

## 2018-01-22 ENCOUNTER — COMMUNICATION - GICH (OUTPATIENT)
Dept: RADIOLOGY | Facility: OTHER | Age: 63
End: 2018-01-22

## 2018-01-24 ENCOUNTER — TRANSFERRED RECORDS (OUTPATIENT)
Dept: HEALTH INFORMATION MANAGEMENT | Facility: CLINIC | Age: 63
End: 2018-01-24

## 2018-01-24 ENCOUNTER — HOSPITAL ENCOUNTER (OUTPATIENT)
Dept: RADIOLOGY | Facility: OTHER | Age: 63
End: 2018-01-24
Attending: INTERNAL MEDICINE

## 2018-01-24 ENCOUNTER — DOCUMENTATION ONLY (OUTPATIENT)
Dept: FAMILY MEDICINE | Facility: OTHER | Age: 63
End: 2018-01-24

## 2018-01-24 ENCOUNTER — MEDICAL CORRESPONDENCE (OUTPATIENT)
Facility: CLINIC | Age: 63
End: 2018-01-24
Payer: COMMERCIAL

## 2018-01-24 DIAGNOSIS — R00.0 TACHYCARDIA: ICD-10-CM

## 2018-01-24 PROBLEM — M54.50 LUMBAGO: Status: ACTIVE | Noted: 2018-01-24

## 2018-01-24 PROBLEM — I10 HYPERTENSION: Status: ACTIVE | Noted: 2018-01-24

## 2018-01-24 PROCEDURE — 93325 DOPPLER ECHO COLOR FLOW MAPG: CPT | Mod: 26 | Performed by: INTERNAL MEDICINE

## 2018-01-24 PROCEDURE — 93321 DOPPLER ECHO F-UP/LMTD STD: CPT | Mod: 26 | Performed by: INTERNAL MEDICINE

## 2018-01-24 PROCEDURE — 93350 STRESS TTE ONLY: CPT | Mod: 26 | Performed by: INTERNAL MEDICINE

## 2018-01-24 RX ORDER — HYDROCHLOROTHIAZIDE 12.5 MG/1
1 TABLET ORAL DAILY
COMMUNITY
Start: 2018-01-11 | End: 2019-01-24

## 2018-01-24 RX ORDER — LISINOPRIL 20 MG/1
1 TABLET ORAL DAILY
COMMUNITY
Start: 2016-10-26 | End: 2018-03-26

## 2018-01-24 RX ORDER — DIPHENOXYLATE HYDROCHLORIDE AND ATROPINE SULFATE 2.5; .025 MG/1; MG/1
1 TABLET ORAL DAILY
COMMUNITY
Start: 2013-04-13

## 2018-01-27 VITALS
DIASTOLIC BLOOD PRESSURE: 78 MMHG | DIASTOLIC BLOOD PRESSURE: 82 MMHG | BODY MASS INDEX: 24.38 KG/M2 | SYSTOLIC BLOOD PRESSURE: 132 MMHG | BODY MASS INDEX: 25.55 KG/M2 | HEART RATE: 60 BPM | SYSTOLIC BLOOD PRESSURE: 128 MMHG | WEIGHT: 180 LBS | HEIGHT: 72 IN | HEIGHT: 72 IN | SYSTOLIC BLOOD PRESSURE: 128 MMHG | BODY MASS INDEX: 22.53 KG/M2 | HEART RATE: 54 BPM | DIASTOLIC BLOOD PRESSURE: 78 MMHG | SYSTOLIC BLOOD PRESSURE: 134 MMHG | WEIGHT: 185.5 LBS | DIASTOLIC BLOOD PRESSURE: 78 MMHG | WEIGHT: 163.8 LBS | BODY MASS INDEX: 25.97 KG/M2 | HEIGHT: 71 IN | WEIGHT: 188.6 LBS | TEMPERATURE: 98.1 F | HEART RATE: 56 BPM

## 2018-01-27 VITALS
SYSTOLIC BLOOD PRESSURE: 128 MMHG | WEIGHT: 182 LBS | DIASTOLIC BLOOD PRESSURE: 80 MMHG | TEMPERATURE: 98.5 F | BODY MASS INDEX: 25.75 KG/M2

## 2018-01-27 VITALS
RESPIRATION RATE: 12 BRPM | SYSTOLIC BLOOD PRESSURE: 138 MMHG | BODY MASS INDEX: 25.31 KG/M2 | DIASTOLIC BLOOD PRESSURE: 76 MMHG | WEIGHT: 184 LBS

## 2018-01-27 VITALS
BODY MASS INDEX: 25.49 KG/M2 | SYSTOLIC BLOOD PRESSURE: 128 MMHG | WEIGHT: 169 LBS | WEIGHT: 180.2 LBS | DIASTOLIC BLOOD PRESSURE: 68 MMHG | HEART RATE: 48 BPM | DIASTOLIC BLOOD PRESSURE: 82 MMHG | TEMPERATURE: 98.2 F | BODY MASS INDEX: 23.91 KG/M2 | HEART RATE: 56 BPM | SYSTOLIC BLOOD PRESSURE: 124 MMHG

## 2018-01-29 ASSESSMENT — PATIENT HEALTH QUESTIONNAIRE - PHQ9
SUM OF ALL RESPONSES TO PHQ QUESTIONS 1-9: 0

## 2018-01-31 ENCOUNTER — COMMUNICATION - GICH (OUTPATIENT)
Dept: INTERNAL MEDICINE | Facility: OTHER | Age: 63
End: 2018-01-31

## 2018-02-09 VITALS
HEART RATE: 52 BPM | BODY MASS INDEX: 24.87 KG/M2 | SYSTOLIC BLOOD PRESSURE: 128 MMHG | DIASTOLIC BLOOD PRESSURE: 80 MMHG | WEIGHT: 183.6 LBS | HEIGHT: 72 IN

## 2018-02-11 ASSESSMENT — PATIENT HEALTH QUESTIONNAIRE - PHQ9: SUM OF ALL RESPONSES TO PHQ QUESTIONS 1-9: 0

## 2018-02-12 NOTE — TELEPHONE ENCOUNTER
Patient Information     Patient Name MRN Anjel Pitt [id number] Male 1955      Telephone Encounter by Amie Mcfadden at 2018  3:20 PM     Author:  Amie Mcfadden Service:  (none) Author Type:  (none)     Filed:  2018  3:21 PM Encounter Date:  1/3/2018 Status:  Signed     :  Amie Mcfdaden            Called patient, he wasn't available and wife asked, I try after 4:00.    Amie Mcfadden LPN..............2018 3:21 PM

## 2018-02-12 NOTE — TELEPHONE ENCOUNTER
Patient Information     Patient Name MRN Sex Anjel Crespo [id number] Male 1955      Telephone Encounter by Nahun White at 1/3/2018 11:31 AM     Author:  Nahun White Service:  (none) Author Type:  (none)     Filed:  1/3/2018 11:32 AM Encounter Date:  1/3/2018 Status:  Signed     :  Nahun White            [facility]-Pt's wife Sanam called and stated they wanted to talk to [facility] nurse regarding symtoms. Please call pt and advise.  Thank you,  Nahun White ....................  1/3/2018   11:32 AM

## 2018-02-12 NOTE — TELEPHONE ENCOUNTER
Patient Information     Patient Name MRN Sex Anjel Crespo [id number] Male 1955      Telephone Encounter by Radha Flower at 2017  8:19 AM     Author:  Radha Flower Service:  (none) Author Type:  (none)     Filed:  2017  8:22 AM Encounter Date:  2017 Status:  Signed     :  aRdha Flower            Patient has been having stomach pain for a few weeks. He isn't due for a colonoscopy till 2019. He is wondering if his insurance would pay for one sooner and if they should see Jorge Mcclain MD for this. Explained there are multiple things that can cause stomach pain, suggested he see his primary doctor first. If a colonoscopy is needed explained to his wife they can contact their insurance to see if this is covered, otherwise the do get the procedure prior authed before he has it.  Radha Flower LPN.......................... 2017  8:22 AM

## 2018-02-12 NOTE — TELEPHONE ENCOUNTER
Patient Information     Patient Name MRN Anjel Pitt [id number] Male 1955      Telephone Encounter by Amie Mcfadden at 2018 10:09 AM     Author:  Amie Mcfadden Service:  (none) Author Type:  (none)     Filed:  2018 10:10 AM Encounter Date:  1/3/2018 Status:  Signed     :  Amie Mcfadden            Left message to call back.    Amie Mcfadden LPN..............2018 10:09 AM

## 2018-02-13 ENCOUNTER — NURSE TRIAGE (OUTPATIENT)
Dept: INTERNAL MEDICINE | Facility: OTHER | Age: 63
End: 2018-02-13

## 2018-02-13 NOTE — PROGRESS NOTES
Patient Information     Patient Name MRN Sex Anjel Crespo [id number] Male 1955      Progress Notes by Kristy Slaughter RN at 2018 12:23 PM     Author:  Kristy Slaughter RN Service:  (none) Author Type:  NURS- Registered Nurse     Filed:  2018 12:23 PM Date of Service:  2018 12:23 PM Status:  Signed     :  Kristy Slaughter RN (NURS- Registered Nurse)            Definity requested by echo tech for image enhancement.  No contraindications to therapy.  Verbal consent obtained.  Definity prepped per procedure.  Definity lot#[id number]  See MAR.

## 2018-02-13 NOTE — PROGRESS NOTES
Patient Information     Patient Name MRN Sex Anjel Crespo [id number] Male 1955      Progress Notes by Sanjeev Burns MD at 2018 11:20 AM     Author:  Sanjeev Burns MD Service:  (none) Author Type:  Physician     Filed:  2018 11:31 AM Encounter Date:  2018 Status:  Signed     :  Sanjeev Burns MD (Physician)            SUBJECTIVE:    Anjel Payton is a 62 y.o. male who presents for heart concerns.    HPI Comments: He is in today with concerns about his heart. He has frequent palpitations. He tells me that he's had palpitations for the last 5 days. Sometimes he feels that you irregular heartbeat, sometimes he only notices it when he is checking his pulse. It never lasts for very long, maybe just 20 seconds or so. His past history is remarkable for beta blocker-induced bradycardia. He had been on low-dose metoprolol but that had to be stopped due to his bradycardia. He also had an echocardiogram at that same time because of concerns of possible valvular heart disease, this was essentially normal.    The patient is very active and continues to remain active. He wants to make sure that he doesn't have any sort of heart problem that needs attention with the symptoms that he is having. He does not have any history of ischemic heart disease in the past.      No Known Allergies,   Current Outpatient Prescriptions     Medication  Sig     aspirin 81 mg tablet Take 2 tablets by mouth once daily with a meal.     cyanocobalamin (VITAMIN B-12) 500 mcg tablet Take  by mouth once daily.     hydroCHLOROthiazide 12.5 mg tablet Take 1 tablet by mouth once daily.     lisinopril (PRINIVIL; ZESTRIL) 20 mg tablet Take 1 tablet by mouth once daily.     multivitamin (MVI) tablet Take 1 tablet by mouth once daily.     No current facility-administered medications for this visit.      Medications have been reviewed by me and are current to the best of my knowledge and ability. ,   Past Medical History:    "  Diagnosis  Date     H/O lymph node biopsy     Right supraclavicular lymph node biopsy, benign, age 28      Hypertension    ,   Patient Active Problem List       Diagnosis  Date Noted     Vegetarian  01/02/2014     BACK PAIN, LUMBAR, CHRONIC       with L5-S1 disc protrusion, moderate and asymmetric to the right with right S1   nerve rootlet displaced posteriorly          HYPERTENSION       well controlled on Zestril 5 mgs,         and   Past Surgical History:      Procedure  Laterality Date     CARPAL TUNNEL RELEASE      rt       COLONOSCOPY SCREENING  10/5/09    repeat 2019       HERNIA REPAIR  2009    [facility] with mesh       LAP CHOLECYSTECTOMY  01/31/2017    Cholecystectomy,Laparoscopic         REVIEW OF SYSTEMS:  Review of Systems   All other systems reviewed and are negative.      OBJECTIVE:  /80 (Cuff Site: Right Arm, Position: Sitting, Cuff Size: Adult Regular)  Pulse 52  Ht 1.816 m (5' 11.5\")  Wt 83.3 kg (183 lb 9.6 oz)  BMI 25.25 kg/m2    EXAM:   Physical Exam   Constitutional: He is well-developed, well-nourished, and in no distress. No distress.   Cardiovascular: Normal rate, regular rhythm and normal heart sounds.  Exam reveals no gallop and no friction rub.    No murmur heard.  Pulmonary/Chest: Effort normal and breath sounds normal. No respiratory distress. He has no wheezes. He has no rales.   Musculoskeletal: He exhibits no edema.   Neurological: He is alert.   Skin: Skin is warm and dry. He is not diaphoretic.   Psychiatric:   Slightly anxious   Nursing note and vitals reviewed.      ASSESSMENT/PLAN:    ICD-10-CM    1. Tachycardia R00.0 ZIO PATCH-GICH      STRESS TEST EXERCISE DONE WITH STRESS ECHO      ECHO STRESS W CONTRAST   2. HYPERTENSION I10         Plan:  No medication changes at this time. Lab was not indicated at this time either. I am going to get the patient scheduled for a Zio patch monitor to assess for his complaints of palpitations. In addition, stress echo will be ordered to be " done in the near future to rule out any occult ischemic coronary artery disease. His baseline EKG is normal, although has not been repeated since October 2016.

## 2018-02-13 NOTE — PROGRESS NOTES
Patient Information     Patient Name MRN Sex Anjel Crespo [id number] Male 1955      Progress Notes by Kristy Slaughter RN at 2018  1:09 PM     Author:  Kristy Slaughter RN Service:  (none) Author Type:  NURS- Registered Nurse     Filed:  2018  1:10 PM Date of Service:  2018  1:09 PM Status:  Signed     :  Kristy lSaughter RN (NURS- Registered Nurse)            1200 The patient arrived for a stress echo. The procedure, risks and benefits were discussed and the consent was signed. The patient was prepped for the stress test, and the echo sonographer did the initial images with definity for image enhancement. Dr. Burns arrived, and the patient walked 9 minutes and 31 seconds. The patient tolerated the procedure. Stress images were completed and the patient was released in stable condition. Please see the chart for complete test results.

## 2018-02-13 NOTE — TELEPHONE ENCOUNTER
Patient Information     Patient Name MRN Sex Anjel Crespo [id number] Male 1955      Telephone Encounter by Kristy Slaughter RN at 2018  2:54 PM     Author:  Kristy Slaughter RN Service:  (none) Author Type:  NURS- Registered Nurse     Filed:  2018  2:54 PM Encounter Date:  2018 Status:  Signed     :  Kristy Slaughter RN (NURS- Registered Nurse)            Patient verified .  Reminder call for stress test with instructions given.  Patient verbalized understanding.

## 2018-02-13 NOTE — PROGRESS NOTES
Patient Information     Patient Name MRN Sex Anjel Crespo [id number] Male 1955      Progress Notes by Kristy Slaughter RN at 2018 12:12 PM     Author:  Kristy Slaughter RN Service:  (none) Author Type:  NURS- Registered Nurse     Filed:  2018 12:12 PM Date of Service:  2018 12:12 PM Status:  Signed     :  Kristy Slaughter RN (NURS- Registered Nurse)            Falls Risk Criteria:    Age 65 and older or under age 4        Sensory deficits    Poor vision    Use of ambulatory aides    Impaired judgment    Unable to walk independently    Meets High Risk criteria for falls:  no

## 2018-02-13 NOTE — NURSING NOTE
Patient Information     Patient Name MRN Anjel Pitt [id number] Male 1955      Nursing Note by Nusrat Little LPN at 2018 11:20 AM     Author:  Nusrat Little LPN Service:  (none) Author Type:  NURS- Licensed Practical Nurse     Filed:  2018 11:13 AM Encounter Date:  2018 Status:  Signed     :  Nusrat Little LPN (NURS- Licensed Practical Nurse)            The patient is here today to have a follow up visit but would also like to discuss irregular heart rate as well.  Nusrat Little LPN......2018  11:06 AM

## 2018-02-13 NOTE — TELEPHONE ENCOUNTER
Patient Information     Patient Name MRN Anjel Pitt [id number] Male 1955      Telephone Encounter by Makayla Xiao RN at 2018  8:02 AM     Author:  Makayla Xiao RN Service:  (none) Author Type:  NURS- Registered Nurse     Filed:  2018 10:59 AM Encounter Date:  2018 Status:  Signed     :  Makayla Xiao RN (NURS- Registered Nurse)            Physical with Sanjeev Burns MD on 2017 for murmer discovered by Donny Faith MD and concern of possible thoracic outlet syndrome was noted; Medication and Dx were addressed most recently on 2018. Patient has had 3 follow-up appointments with solely Sanjeev Burns MD for this issue along with hypertension.     Diuretics (may be prescribed for edema)    Office visit in the past 12 months or per provider note.    Last visit with DONNY FAITH was on: 2017 in [facility]  Next visit with DONNY FAITH is on: No future appointment listed with this provider  Next visit with Family Practice is on: No future appointment listed in this department    Lab testing requirements:  Creatinine and Potassium annually, if ordering lab, order BMP.  CREATININE (mg/dL)    Date Value   2017 0.71     POTASSIUM (mmol/L)    Date Value   2017 4.1     BP Readings from Last 4 Encounters:    18 128/80   17 132/78   10/27/17 128/82   17 128/78     Review last provider visit note.  If BP reviewed and plan is noted, can refill.  Max refill for 12 months from last office visit or per provider note.    Prescription refilled per RN Medication Refill Policy.................... Makayla Xiao RN ....................  2018   10:54 AM

## 2018-02-14 ENCOUNTER — TELEPHONE (OUTPATIENT)
Dept: INTERNAL MEDICINE | Facility: OTHER | Age: 63
End: 2018-02-14

## 2018-02-14 DIAGNOSIS — J11.1 INFLUENZA WITH RESPIRATORY MANIFESTATION OTHER THAN PNEUMONIA: Primary | ICD-10-CM

## 2018-02-14 RX ORDER — OSELTAMIVIR PHOSPHATE 75 MG/1
75 CAPSULE ORAL 2 TIMES DAILY
Qty: 10 CAPSULE | Refills: 0 | Status: SHIPPED | OUTPATIENT
Start: 2018-02-14 | End: 2018-03-22

## 2018-02-14 NOTE — TELEPHONE ENCOUNTER
Patient calling and states that on Monday night he started having fever, chills, headache, little bit of cough not much.  States was at wedding this weekend could have been exposed.  States he feels he is getting better but wife suggested he call about Tamiflu  States had fever last night 101 orally and this morning 100 orally.  Has been taking aspirin to treat this.  Patient wondering what Dr. Burns suggests about Tamiflu or should he just wait it out.  Patient states he does not want to have to come in but would try Tamiflu if Dr. Burns suggests it.    Patient requests physician review and consideration and call back please.    Ebony Constantino RN    Phone note routed to Dr. Burns for review.    Additional Information    Negative: Severe difficulty breathing (e.g., struggling for each breath, speaks in single words)    Negative: Bluish lips, tongue, or face now    Negative: Shock suspected (e.g., cold/pale/clammy skin, too weak to stand, low BP, rapid pulse)    Negative: Sounds like a life-threatening emergency to the triager    Negative: Severe sore throat    Negative: [1] Doesn't match the criteria for Influenza AND [2] sounds like a cold    Negative: Influenza vaccine reaction is suspected    Negative: Chest pain  (Exception: MILD central chest pain, present only when coughing)    Negative: [1] Headache AND [2] stiff neck (can't touch chin to chest)    Negative: Fever > 104 F (40 C)    Negative: [1] Difficulty breathing AND [2] not severe AND [3] not from stuffy nose (e.g., not relieved by cleaning out the nose)    Negative: Patient sounds very sick or weak to the triager    Negative: [1] Fever > 101 F (38.3 C) AND [2] age > 60    Negative: [1] Fever > 101 F (38.3 C) AND [2] bedridden (e.g., nursing home patient, CVA, chronic illness, recovering from surgery)    Negative: [1] Fever > 100.5 F (38.1 C) AND [2] diabetes mellitus or weak immune system (e.g., HIV positive, cancer chemo, splenectomy, organ  "transplant, chronic steroids)    Negative: Patient is HIGH RISK (e.g., age > 64 years, pregnant, HIV+, or chronic medical condition)    Negative: Fever present > 3 days (72 hours)    Negative: [1] Fever returns after gone for over 24 hours AND [2] symptoms worse or not improved    Negative: [1] Using nasal washes and pain medicine > 24 hours AND [2] sinus pain (around cheekbone or eye) persists    Negative: Earache    Negative: [1] Patient is NOT HIGH RISK AND [2] strongly requests antiviral medicine AND [3] flu symptoms present < 48 hours    Negative: [1] Nasal discharge AND [2] present > 10 days    Negative: Cough present > 3 weeks    Answer Assessment - Initial Assessment Questions  1. WORST SYMPTOM: \"What is your worst symptom?\" (e.g., cough, runny nose, muscle aches, headache, sore throat, fever)       Headache, fever, chills, little muscle aches, little cough  2. ONSET: \"When did your flu symptoms start?\"       Monday night  3. COUGH: \"How bad is the cough?\"        Not a bad cough just a little phlegm occ  4. RESPIRATORY DISTRESS: \"Describe your breathing.\"       denies  5. FEVER: \"Do you have a fever?\" If so, ask: \"What is your temperature, how was it measured, and when did it start?\"      100 oral this morning, last night 101  6. EXPOSURE: \"Were you exposed to someone with influenza?\"        Unsure was at wedding this weekend  7. FLU VACCINE: \"Did you get a flu shot this year?\"      no  8. HIGH RISK DISEASE: \"Do you any major health problems?\" (e.g., heart or lung disease, asthma, weak immune system, or other HIGH RISK conditions)      denies  9. PREGNANCY: \"Is there any chance you are pregnant?\" \"When was your last menstrual period?\"      n/a  10. OTHER SYMPTOMS: \"Do you have any other symptoms?\"  (e.g., runny nose, muscle aches, headache, sore throat)       Headache, fever,chills, little muscle ache    Protocols used: INFLUENZA - SEASONAL-ADULT-AH    "

## 2018-02-14 NOTE — TELEPHONE ENCOUNTER
Contacted the patient and he reports he would like a script sent in to the pharmacy.  VELMA VILLAGOMEZ LPN 2/14/2018 9:16 AM

## 2018-02-14 NOTE — TELEPHONE ENCOUNTER
Patient calling and states that on Monday night he started having fever, chills, headache, little bit of cough not much.  States was at wedding this weekend could have been exposed.  States he feels he is getting better but wife suggested he call about Tamiflu  States had fever last night 101 orally and this morning 100 orally.  Has been taking aspirin to treat this.  Patient states he has nothing that causes him to have a weak immune system.    Patient wondering what Dr. Burns suggests about Tamiflu or should he just wait it out.  Patient states he does not want to have to come in but would try Tamiflu if Dr. Burns suggests it.    Patient requests physician review and consideration and call back please.    Ebony Constantino RN

## 2018-03-22 ENCOUNTER — OFFICE VISIT (OUTPATIENT)
Dept: UROLOGY | Facility: OTHER | Age: 63
End: 2018-03-22
Attending: UROLOGY
Payer: COMMERCIAL

## 2018-03-22 VITALS
RESPIRATION RATE: 14 BRPM | DIASTOLIC BLOOD PRESSURE: 80 MMHG | BODY MASS INDEX: 25.2 KG/M2 | SYSTOLIC BLOOD PRESSURE: 130 MMHG | HEIGHT: 71 IN | WEIGHT: 180 LBS

## 2018-03-22 DIAGNOSIS — K40.90 RIGHT INGUINAL HERNIA: Primary | ICD-10-CM

## 2018-03-22 PROCEDURE — 99213 OFFICE O/P EST LOW 20 MIN: CPT | Performed by: UROLOGY

## 2018-03-22 ASSESSMENT — PAIN SCALES - GENERAL: PAINLEVEL: NO PAIN (1)

## 2018-03-22 NOTE — PROGRESS NOTES
"Type of Visit  EST    Chief Complaint  Lower obstructive uropathy    HPI  Mr. Payton is a 62 y.o. male who follows up with complaints of right inguinal pain.  No urinary symptoms.  Pain started 4 weeks ago.  Worse with activity.  No history of hernia.  No injury.  He goes to the gym regularly.  No change in bowel habits.  Intensity 1/10 now, 3/10 at its worst in the last 4 weeks      Family History  Family History   Problem Relation Age of Onset     Cancer-colon Mother       colon cancer,  age 60 or 61     Diabetes Father       post renal transplant secondary to     diabetic complications,     Hypertension Father      Heart Disease Father       of myocardial infarction at age 57     Heart Disease Brother      With bicuspid aortic valve, status post aortic valve replacement       Review of Systems  I personally reviewed the ROS with the patient.    Nursing Notes:   Dorys Morgan LPN  3/22/2018 10:14 AM  Signed  Here for prostate exam and groin pain.  Review of Systems:    Weight loss:    No     Recent fever/chills:  No   Night sweats:   No  Current skin rash:  No   Recent hair loss:  No  Heat intolerance:  No   Cold intolerance:  No  Chest pain:   No   Palpitations:   No  Shortness of breath:  No   Wheezing:   No  Constipation:    No   Diarrhea:   No   Nausea:   No   Vomiting:   No   Kidney/side pain:  No   Back pain:   No  Frequent headaches:  No   Dizziness:     No  Leg swelling:   No   Calf pain:    No    Dorys Morgan LPN on 3/22/2018 at 10:04 AM      Physical Exam  /80 (BP Location: Right arm, Patient Position: Sitting, Cuff Size: Adult Regular)  Resp 14  Ht 1.803 m (5' 11\")  Wt 81.6 kg (180 lb)  BMI 25.1 kg/m2    Constitutional: No acute distress.  Alert and cooperative   Head: NCAT  Eyes: Conjunctivae normal  Cardiovascular: Regular rate.  Pulmonary/Chest: Respirations are even and non-labored bilaterally, no audible wheezing  Abdominal: Soft. No distension, tenderness, masses " or guarding.   Neurological: A + O x 3.  Cranial Nerves II-XII grossly intact.  Extremities: MARY x 4, Warm. No clubbing.  No cyanosis.    Skin: Pink, warm and dry.  No visible rashes noted.  Psychiatric:  Normal mood and affect  Back:  No left CVA tenderness.  No right CVA tenderness.  Genitourinary:  Nonpalpable bladder  ANTONINA: 20 grams, no nodules.  Small inguinal hernia on right with valsalva    Labs  Results for JOANIE PAYTON (MRN [id number]) as of 3/22/2018 10:15   2/26/2015 12:15 10/26/2016 09:53 6/28/2017 17:29   PSA Total (Diagnostic) - Historical 2.180 0.970 0.891     Imaging  CT a/p   1/4/2017  I personally reviewed and interpreted the images and report.  IMPRESSION:    Mild hydroureteronephrosis and marked bladder distention may reflect bladder outlet obstruction related to prostatic hypertrophy.   No evidence of collecting system calculus.    Multiple gallstones identified.      Post-Void Residual  A post-void residual was measured by ultrasonic bladder scanner.  11 mL (previously recorded)    Assessment & Plan  Mr. Payton is a 62 y.o. male who follows up with concerns regarding right inguinal hernia on exam.  No associated  issues - no LUTS, PSA and ANTONINA normal.  Discussed referral to general surgery to evaluate right inguinal hernia.

## 2018-03-22 NOTE — MR AVS SNAPSHOT
After Visit Summary   3/22/2018    Anjel Payton    MRN: [id number]           Patient Information     Date Of Birth          1955        Visit Information        Provider Department      3/22/2018 10:30 AM Brandon Rivera MD [facility]        Today's Diagnoses     Right inguinal hernia    -  1       Follow-ups after your visit        Additional Services     GENERAL SURG ADULT REFERRAL       Your provider has referred you to: PREFERRED PROVIDERS:  Dr Mcclain    Please be aware that coverage of these services is subject to the terms and limitations of your health insurance plan.  Call member services at your health plan with any benefit or coverage questions.      Please bring the following with you to your appointment:    (1) Any X-Rays, CTs or MRIs which have been performed.  Contact the facility where they were done to arrange for  prior to your scheduled appointment.   (2) List of current medications   (3) This referral request   (4) Any documents/labs given to you for this referral                  Who to contact     If you have questions or need follow up information about today's clinic visit or your schedule please contact [facility] directly at [phone].  Normal or non-critical lab and imaging results will be communicated to you by Kiwihart, letter or phone within 4 business days after the clinic has received the results. If you do not hear from us within 7 days, please contact the clinic through Kiwihart or phone. If you have a critical or abnormal lab result, we will notify you by phone as soon as possible.  Submit refill requests through LiquidCompass or call your pharmacy and they will forward the refill request to us. Please allow 3 business days for your refill to be completed.          Additional Information About Your Visit        MyChart Information     LiquidCompass lets you send messages to your doctor, view your test results, renew your  "prescriptions, schedule appointments and more. To sign up, go to [email or web address] . Click on \"Log in\" on the left side of the screen, which will take you to the Welcome page. Then click on \"Sign up Now\" on the right side of the page.     You will be asked to enter the access code listed below, as well as some personal information. Please follow the directions to create your username and password.     Your access code is: [email or web address]  Expires: 2018 10:24 AM     Your access code will  in 90 days. If you need help or a new code, please call your [place] clinic or [phone].        Care EveryWhere ID     This is your Care EveryWhere ID. This could be used by other organizations to access your [place] medical records  [id number]        Your Vitals Were     Respirations Height BMI (Body Mass Index)             14 1.803 m (5' 11\") 25.1 kg/m2          Blood Pressure from Last 3 Encounters:   18 130/80   18 128/80   17 132/78    Weight from Last 3 Encounters:   18 81.6 kg (180 lb)   18 83.3 kg (183 lb 9.6 oz)   17 81.6 kg (180 lb)              We Performed the Following     GENERAL SURG ADULT REFERRAL          Today's Medication Changes          These changes are accurate as of 3/22/18 10:35 AM.  If you have any questions, ask your nurse or doctor.               Stop taking these medicines if you haven't already. Please contact your care team if you have questions.     oseltamivir 75 MG capsule   Commonly known as:  TAMIFLU   Stopped by:  Brandon Rivera MD                    Primary Care Provider Office Phone # Fax #    Donny Trivedi [phone] [address] [facility] [address]        Equal Access to Services     [facility] : aida Davis, renard rudolph. [facility] [phone].    ATENCIÓN: Si neisha fernandez a perdue disposición " servicios gratuitos de asistencia lingüística. Dmitry topete [phone].    We comply with applicable federal civil rights laws and [place] laws. We do not discriminate on the basis of race, color, national origin, age, disability, sex, sexual orientation, or gender identity.            Thank you!     Thank you for choosing [facility] AND [facility]  for your care. Our goal is always to provide you with excellent care. Hearing back from our patients is one way we can continue to improve our services. Please take a few minutes to complete the written survey that you may receive in the mail after your visit with us. Thank you!             Your Updated Medication List - Protect others around you: Learn how to safely use, store and throw away your medicines at www.disposemymeds.org.          This list is accurate as of 3/22/18 10:35 AM.  Always use your most recent med list.                   Brand Name Dispense Instructions for use Diagnosis    aspirin 81 MG tablet      Take 2 tablets by mouth daily with food        hydrochlorothiazide 12.5 MG Tabs tablet      Take 1 tablet by mouth daily        lisinopril 20 MG tablet    PRINIVIL/ZESTRIL     Take 1 tablet by mouth daily        MULTI-VITAMINS Tabs      Take 1 tablet by mouth daily        SM VITAMIN B-12 500 MCG Tabs   Generic drug:  cyanocobalamin

## 2018-03-22 NOTE — NURSING NOTE
Here for prostate exam and groin pain.  Review of Systems:    Weight loss:    No     Recent fever/chills:  No   Night sweats:   No  Current skin rash:  No   Recent hair loss:  No  Heat intolerance:  No   Cold intolerance:  No  Chest pain:   No   Palpitations:   No  Shortness of breath:  No   Wheezing:   No  Constipation:    No   Diarrhea:   No   Nausea:   No   Vomiting:   No   Kidney/side pain:  No   Back pain:   No  Frequent headaches:  No   Dizziness:     No  Leg swelling:   No   Calf pain:    No    Dorys Morgan LPN on 3/22/2018 at 10:04 AM

## 2018-03-26 DIAGNOSIS — I10 BENIGN ESSENTIAL HYPERTENSION: Primary | ICD-10-CM

## 2018-03-26 RX ORDER — LISINOPRIL 20 MG/1
TABLET ORAL
Qty: 90 TABLET | Refills: 3 | Status: SHIPPED | OUTPATIENT
Start: 2018-03-26 | End: 2019-03-06

## 2018-03-28 ENCOUNTER — OFFICE VISIT (OUTPATIENT)
Dept: SURGERY | Facility: OTHER | Age: 63
End: 2018-03-28
Attending: UROLOGY
Payer: COMMERCIAL

## 2018-03-28 VITALS
HEIGHT: 71 IN | DIASTOLIC BLOOD PRESSURE: 68 MMHG | BODY MASS INDEX: 25.06 KG/M2 | WEIGHT: 179 LBS | SYSTOLIC BLOOD PRESSURE: 136 MMHG

## 2018-03-28 DIAGNOSIS — K40.90 RIGHT INGUINAL HERNIA: ICD-10-CM

## 2018-03-28 PROCEDURE — 99243 OFF/OP CNSLTJ NEW/EST LOW 30: CPT | Performed by: SURGERY

## 2018-03-28 ASSESSMENT — PAIN SCALES - GENERAL: PAINLEVEL: NO PAIN (1)

## 2018-03-28 NOTE — PROGRESS NOTES
Surgical Clinic Consult  Referring physician:  Brandon Rivera  Primary physician:     Donny Trivedi    Chief complaint:   Possible right inguinal hernia    History of present illness:  This is a 62 year old male I am seeing in consultation for a possible right inguinal hernia.  The patient noticed pain in the right groin about a month ago.  The pain has subsided.  He never noticed a bulge.  He saw Dr. Rivera who felt he might have a small right inguinal hernia.  He remains active.     Past medical history:   Past Medical History:   Diagnosis Date     Essential (primary) hypertension     No Comments Provided     Other specified postprocedural states     Right supraclavicular lymph node biopsy, benign, age 28       Pastsurgical history:  Past Surgical History:   Procedure Laterality Date     COLONOSCOPY      10/5/09,repeat 2019     LAPAROSCOPIC CHOLECYSTECTOMY      2017,Cholecystectomy,Laparoscopic     OTHER SURGICAL HISTORY      ,,HERNIA REPAIR,[facility] with mesh     RELEASE CARPAL TUNNEL      rt       Current medications:  Current Outpatient Prescriptions   Medication Sig Dispense Refill     lisinopril (PRINIVIL/ZESTRIL) 20 MG tablet TAKE 1 TABLET DAILY 90 tablet 3     aspirin 81 MG tablet Take 2 tablets by mouth daily with food       cyanocobalamin (SM VITAMIN B-12) 500 MCG TABS        hydrochlorothiazide 12.5 MG TABS tablet Take 1 tablet by mouth daily       Multiple Vitamin (MULTI-VITAMINS) TABS Take 1 tablet by mouth daily         Allergies:  Not on File    Family history:  Family History   Problem Relation Age of Onset     Colon Cancer Mother      Cancer-colon, colon cancer,  age 60 or 61     DIABETES Father      Diabetes, post renal transplant secondary to     diabetic complications,     Hypertension Father      Hypertension     HEART DISEASE Father      Heart Disease, of myocardial infarction at age 57     HEART DISEASE Brother      Heart Disease,With bicuspid aortic valve, status post aortic  "valve replacement       Social history:  Social History     Social History     Marital status:      Spouse name: N/A     Number of children: N/A     Years of education: N/A     Occupational History     Not on file.     Social History Main Topics     Smoking status: Former Smoker     Quit date: 10/25/1975     Smokeless tobacco: Former User     Quit date: 10/25/1998     Alcohol use 0.5 oz/week      Comment: Alcoholic Drinks/day: occasional glass of wine     Drug use: Not on file      Comment: Drug use: No     Sexual activity: Not on file     Other Topics Concern     Not on file     Social History Narrative    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       PROBLEM LIST:  Patient Active Problem List   Diagnosis     Lumbago     Hypertension     Vegetarian     Benign essential hypertension     Review of systems:  COMPLETE REVIEW OF SYSTEMS: Denies problems  Gastrointestinal: Family history colon cancer in his mother around age 60  Cardiovascular: Negative workup for cardiac arrhythmia  Respiratory: Denies problems  Genitourinary: Denies problems  Musculoskeletal: Cold hands and feet  Skin: Denies problems  Neurologic: Denies problems  Psychiatric: Denies problems  Endocrine: Denies problems  Heme/Lymphatic: Denies problems  Vascular: Denies problems      Physical exam: /68 (BP Location: Right arm, Patient Position: Sitting, Cuff Size: Adult Large)  Ht 5' 11\" (1.803 m)  Wt 179 lb (81.2 kg)  BMI 24.97 kg/m2      General: this is a pleasant male patient in no acute distress.  Patient is awake alert and oriented x3 .     Abdominal: Laparoscopic scars.  Soft and non-tender.  Do not appreciate any bulges in either inguinal area.  The patient coughed and strained.  He was examined supine and standing.     Assessment:   Right inguinal discomfort.  I do not appreciate a hernia on today's examination.     Plan:    Patient will follow up should he develop the " signs and symptoms of an inguinal hernia.  Due for colonoscopy in 2019.       Jorge Mcclain MD FACS

## 2018-03-28 NOTE — MR AVS SNAPSHOT
"              After Visit Summary   3/28/2018    Anjel Payton    MRN: [id number]           Patient Information     Date Of Birth          1955        Visit Information        Provider Department      3/28/2018 2:50 PM Jorge Mcclain MD [facility]        Today's Diagnoses     Right inguinal hernia           Follow-ups after your visit        Follow-up notes from your care team     Return if symptoms worsen or fail to improve.      Who to contact     If you have questions or need follow up information about today's clinic visit or your schedule please contact [facility] directly at [phone].  Normal or non-critical lab and imaging results will be communicated to you by VTX Technologyhart, letter or phone within 4 business days after the clinic has received the results. If you do not hear from us within 7 days, please contact the clinic through [email or web address] or phone. If you have a critical or abnormal lab result, we will notify you by phone as soon as possible.  Submit refill requests through MedShape or call your pharmacy and they will forward the refill request to us. Please allow 3 business days for your refill to be completed.          Additional Information About Your Visit        MyChart Information     MedShape lets you send messages to your doctor, view your test results, renew your prescriptions, schedule appointments and more. To sign up, go to [email or web address] . Click on \"Log in\" on the left side of the screen, which will take you to the Welcome page. Then click on \"Sign up Now\" on the right side of the page.     You will be asked to enter the access code listed below, as well as some personal information. Please follow the directions to create your username and password.     Your access code is: [email or web address]  Expires: 2018 10:24 AM     Your access code will  in 90 days. If you need help or a new code, please call your [place] clinic or [phone].      " "  Care EveryWhere ID     This is your Care EveryWhere ID. This could be used by other organizations to access your [place] medical records  [id number]        Your Vitals Were     Height BMI (Body Mass Index)                5' 11\" (1.803 m) 24.97 kg/m2           Blood Pressure from Last 3 Encounters:   03/28/18 136/68   03/22/18 130/80   01/08/18 128/80    Weight from Last 3 Encounters:   03/28/18 179 lb (81.2 kg)   03/22/18 180 lb (81.6 kg)   01/08/18 183 lb 9.6 oz (83.3 kg)              Today, you had the following     No orders found for display       Primary Care Provider Office Phone # Fax #    Donny Trivedi [phone] [address] [facility] [address]        Equal Access to Services     [facility]: Hadii boby burnett hadasho Solydia, waaxda luqadaha, qaybta kaalmada lee annyaperez, renard karimi . [facility] [phone].    ATENCIÓN: Si habla español, tiene a perdue disposición servicios gratuitos de asistencia lingüística. Dmitry al [phone].    We comply with applicable federal civil rights laws and [place] laws. We do not discriminate on the basis of race, color, national origin, age, disability, sex, sexual orientation, or gender identity.            Thank you!     Thank you for choosing [facility] AND [facility]  for your care. Our goal is always to provide you with excellent care. Hearing back from our patients is one way we can continue to improve our services. Please take a few minutes to complete the written survey that you may receive in the mail after your visit with us. Thank you!             Your Updated Medication List - Protect others around you: Learn how to safely use, store and throw away your medicines at www.disposemymeds.org.          This list is accurate as of 3/28/18  3:33 PM.  Always use your most recent med list.                   Brand Name Dispense Instructions for use Diagnosis    aspirin 81 MG tablet      Take 2 " tablets by mouth daily with food        hydrochlorothiazide 12.5 MG Tabs tablet      Take 1 tablet by mouth daily        lisinopril 20 MG tablet    PRINIVIL/ZESTRIL    90 tablet    TAKE 1 TABLET DAILY    Benign essential hypertension       MULTI-VITAMINS Tabs      Take 1 tablet by mouth daily        SM VITAMIN B-12 500 MCG Tabs   Generic drug:  cyanocobalamin

## 2018-06-11 ENCOUNTER — OFFICE VISIT (OUTPATIENT)
Dept: FAMILY MEDICINE | Facility: OTHER | Age: 63
End: 2018-06-11
Attending: FAMILY MEDICINE
Payer: COMMERCIAL

## 2018-06-11 VITALS
HEART RATE: 52 BPM | SYSTOLIC BLOOD PRESSURE: 120 MMHG | DIASTOLIC BLOOD PRESSURE: 78 MMHG | BODY MASS INDEX: 25.34 KG/M2 | HEIGHT: 71 IN | WEIGHT: 181 LBS

## 2018-06-11 DIAGNOSIS — S91.311A LACERATION OF RIGHT FOOT, INITIAL ENCOUNTER: Primary | ICD-10-CM

## 2018-06-11 PROCEDURE — 99213 OFFICE O/P EST LOW 20 MIN: CPT | Performed by: FAMILY MEDICINE

## 2018-06-11 NOTE — PROGRESS NOTES
"  SUBJECTIVE:   Anjel Payton is a 62 year old male who presents to clinic today for the following health issues: Laceration  HPI Comments: Patient arrives here for foot laceration.  He cut himself about a week and a half ago.  It cut him in a flap-like laceration.  Checking to see if it is healing well    Laceration           Patient Active Problem List    Diagnosis Date Noted     Benign essential hypertension 03/26/2018     Priority: Medium     Lumbago 01/24/2018     Priority: Medium     Overview:   with L5-S1 disc protrusion, moderate and asymmetric to the right with right S1   nerve rootlet displaced posteriorly       Hypertension 01/24/2018     Priority: Medium     Overview:   well controlled on Zestril 5 mgs,       Vegetarian 01/02/2014     Priority: Medium     Past Medical History:   Diagnosis Date     Essential (primary) hypertension     No Comments Provided     Other specified postprocedural states     Right supraclavicular lymph node biopsy, benign, age 28      Past Surgical History:   Procedure Laterality Date     COLONOSCOPY      10/5/09,repeat 2019     LAPAROSCOPIC CHOLECYSTECTOMY      01/31/2017,Cholecystectomy,Laparoscopic     OTHER SURGICAL HISTORY      2009,,HERNIA REPAIR,[facility] with mesh     RELEASE CARPAL TUNNEL      rt     No Known Allergies    Review of Systems     OBJECTIVE:     /78 (BP Location: Right arm, Patient Position: Sitting, Cuff Size: Adult Regular)  Pulse 52  Ht 5' 11\" (1.803 m)  Wt 181 lb (82.1 kg)  BMI 25.24 kg/m2  Body mass index is 25.24 kg/(m^2).  Physical Exam   Neurological: He is alert.   Skin:   Patient has a flap laceration low 1 cm on his right first digit.  Appears to healing well.  No signs of secondary infection       none     ASSESSMENT/PLAN:       Tetanus is up-to-date.  Reassurance is given to the patient.  Is acting as a good barrier and would not remove it as yet.  Follow-up if any symptoms should develop    ICD-10-CM    1. Laceration of right foot, " initial encounter S91.311A          Donny Trivedi MD  [facility] AND [facility]

## 2018-06-11 NOTE — NURSING NOTE
Patient presents to the clinic today for a laceration on his right foot, he states it happened about 1.5 weeks.    Paola Sue LPN.................. 6/11/2018 9:51 AM

## 2018-06-11 NOTE — MR AVS SNAPSHOT
"              After Visit Summary   2018    Anjel Payton    MRN: [id number]           Patient Information     Date Of Birth          1955        Visit Information        Provider Department      2018 9:45 AM Donny Trivedi MD [facility]        Today's Diagnoses     Laceration of right foot, initial encounter    -  1       Follow-ups after your visit        Who to contact     If you have questions or need follow up information about today's clinic visit or your schedule please contact [facility] directly at [phone].  Normal or non-critical lab and imaging results will be communicated to you by [employer], letter or phone within 4 business days after the clinic has received the results. If you do not hear from us within 7 days, please contact the clinic through [employer] or phone. If you have a critical or abnormal lab result, we will notify you by phone as soon as possible.  Submit refill requests through [employer] or call your pharmacy and they will forward the refill request to us. Please allow 3 business days for your refill to be completed.          Additional Information About Your Visit        MyChart Information     [employer] lets you send messages to your doctor, view your test results, renew your prescriptions, schedule appointments and more. To sign up, go to [email or web address] . Click on \"Log in\" on the left side of the screen, which will take you to the Welcome page. Then click on \"Sign up Now\" on the right side of the page.     You will be asked to enter the access code listed below, as well as some personal information. Please follow the directions to create your username and password.     Your access code is: [email or web address]  Expires: 2018 10:24 AM     Your access code will  in 90 days. If you need help or a new code, please call your [place] clinic or [phone].        Care EveryWhere ID     This is your Care EveryWhere ID. " "This could be used by other organizations to access your [place] medical records  [id number]        Your Vitals Were     Pulse Height BMI (Body Mass Index)             52 5' 11\" (1.803 m) 25.24 kg/m2          Blood Pressure from Last 3 Encounters:   06/11/18 120/78   03/28/18 136/68   03/22/18 130/80    Weight from Last 3 Encounters:   06/11/18 181 lb (82.1 kg)   03/28/18 179 lb (81.2 kg)   03/22/18 180 lb (81.6 kg)              Today, you had the following     No orders found for display       Primary Care Provider Office Phone # Fax #    Donny Trivedi [phone]       [address] [facility] [address]        Equal Access to Services     [facility] HospitalBREANA : Hadii boby bolanos Solydia, waaxda luqadaha, qaybta kaalmada adeegyada, renard karimi . So [facility] [phone].    ATENCIÓN: Si habla español, tiene a perdue disposición servicios gratuitos de asistencia lingüística. Dmitry al [phone].    We comply with applicable federal civil rights laws and [place] laws. We do not discriminate on the basis of race, color, national origin, age, disability, sex, sexual orientation, or gender identity.            Thank you!     Thank you for choosing [facility] AND [facility]  for your care. Our goal is always to provide you with excellent care. Hearing back from our patients is one way we can continue to improve our services. Please take a few minutes to complete the written survey that you may receive in the mail after your visit with us. Thank you!             Your Updated Medication List - Protect others around you: Learn how to safely use, store and throw away your medicines at www.disposemymeds.org.          This list is accurate as of 6/11/18  3:20 PM.  Always use your most recent med list.                   Brand Name Dispense Instructions for use Diagnosis    aspirin 81 MG tablet      Take 2 tablets by mouth daily with food        hydrochlorothiazide " 12.5 MG Tabs tablet      Take 1 tablet by mouth daily        lisinopril 20 MG tablet    PRINIVIL/ZESTRIL    90 tablet    TAKE 1 TABLET DAILY    Benign essential hypertension       MULTI-VITAMINS Tabs      Take 1 tablet by mouth daily        SM VITAMIN B-12 500 MCG Tabs   Generic drug:  cyanocobalamin

## 2018-06-20 NOTE — NURSING NOTE
Here today in consultation for a possible inguinal hernia.  Zayra Loo LPN..........3/28/2018  2:50 PM     No

## 2018-06-28 ENCOUNTER — OFFICE VISIT (OUTPATIENT)
Dept: FAMILY MEDICINE | Facility: OTHER | Age: 63
End: 2018-06-28
Attending: FAMILY MEDICINE
Payer: COMMERCIAL

## 2018-06-28 VITALS
SYSTOLIC BLOOD PRESSURE: 128 MMHG | HEART RATE: 60 BPM | WEIGHT: 176 LBS | DIASTOLIC BLOOD PRESSURE: 80 MMHG | BODY MASS INDEX: 24.55 KG/M2

## 2018-06-28 DIAGNOSIS — K62.5 BLOOD PER RECTUM: ICD-10-CM

## 2018-06-28 DIAGNOSIS — Z80.0 FAMILY HISTORY OF COLON CANCER: ICD-10-CM

## 2018-06-28 DIAGNOSIS — R10.84 ABDOMINAL PAIN, GENERALIZED: Primary | ICD-10-CM

## 2018-06-28 PROCEDURE — 99214 OFFICE O/P EST MOD 30 MIN: CPT | Performed by: FAMILY MEDICINE

## 2018-06-28 ASSESSMENT — PAIN SCALES - GENERAL: PAINLEVEL: NO PAIN (1)

## 2018-06-28 NOTE — MR AVS SNAPSHOT
After Visit Summary   2018    Anjel Payton    MRN: [id number]           Patient Information     Date Of Birth          1955        Visit Information        Provider Department      2018 2:30 PM Donny Trivedi MD [facility]        Today's Diagnoses     Abdominal pain, generalized    -  1    Family history of colon cancer mom  62        Blood per rectum           Follow-ups after your visit        Additional Services     GASTROENTEROLOGY ADULT REF PROCEDURE ONLY       Last Lab Result: Creatinine (mg/dL)       Date                     Value                 2017               0.71             ----------  Body mass index is 24.55 kg/(m^2).     Needed:  No  Language:  English    Patient will be contacted to schedule procedure.     Please be aware that coverage of these services is subject to the terms and limitations of your health insurance plan.  Call member services at your health plan with any benefit or coverage questions.  Any procedures must be performed at a [facility] facility OR coordinated by your clinic's referral office.    Please bring the following with you to your appointment:    (1) Any X-Rays, CTs or MRIs which have been performed.  Contact the facility where they were done to arrange for  prior to your scheduled appointment.    (2) List of current medications   (3) This referral request   (4) Any documents/labs given to you for this referral                  Your next 10 appointments already scheduled     2018  9:15 AM CDT   [facility]YS[facility] with Don[facility]edi [facility]    [address]  [facility] [id number] [phone]              Who to contact     If you have questions or need follow up information about today's clinic visit or your schedule please contact [facility] directly at [phone].  Normal or  "non-critical lab and imaging results will be communicated to you by MyChart, letter or phone within 4 business days after the clinic has received the results. If you do not hear from us within 7 days, please contact the clinic through [facility] or phone. If you have a critical or abnormal lab result, we will notify you by phone as soon as possible.  Submit refill requests through [employer] or call your pharmacy and they will forward the refill request to us. Please allow 3 business days for your refill to be completed.          Additional Information About Your Visit        [employer] Information     [employer] lets you send messages to your doctor, view your test results, renew your prescriptions, schedule appointments and more. To sign up, go to [email or web address] . Click on \"Log in\" on the left side of the screen, which will take you to the Welcome page. Then click on \"Sign up Now\" on the right side of the page.     You will be asked to enter the access code listed below, as well as some personal information. Please follow the directions to create your username and password.     Your access code is: [email or web address]  Expires: 2018  8:07 AM     Your access code will  in 90 days. If you need help or a new code, please call your [place] clinic or [phone].        Care EveryWhere ID     This is your Care EveryWhere ID. This could be used by other organizations to access your [place] medical records  [id number]        Your Vitals Were     Pulse BMI (Body Mass Index)                60 24.55 kg/m2           Blood Pressure from Last 3 Encounters:   18 128/80   18 120/78   18 136/68    Weight from Last 3 Encounters:   18 176 lb (79.8 kg)   18 181 lb (82.1 kg)   18 179 lb (81.2 kg)              We Performed the Following     GASTROENTEROLOGY ADULT REF PROCEDURE ONLY        Primary Care Provider Office Phone # Fax #    Donny Trivedi [phone]  [facility] [address]        Equal Access to Services     MICHELLE RUELAS : Hadii aad ku hadsulemanruben Joya, waburtperez pappas, qadavidazucena culpabhinavperez crandall, renard basurtohildaberlin zuluaga. So [facility] [phone].    ATENCIÓN: Si habla español, tiene a perdue disposición servicios gratuitos de asistencia lingüística. Llame al [phone].    We comply with applicable federal civil rights laws and [place] laws. We do not discriminate on the basis of race, color, national origin, age, disability, sex, sexual orientation, or gender identity.            Thank you!     Thank you for choosing [facility]  for your care. Our goal is always to provide you with excellent care. Hearing back from our patients is one way we can continue to improve our services. Please take a few minutes to complete the written survey that you may receive in the mail after your visit with us. Thank you!             Your Updated Medication List - Protect others around you: Learn how to safely use, store and throw away your medicines at www.disposemymeds.org.          This list is accurate as of 6/28/18 11:59 PM.  Always use your most recent med list.                   Brand Name Dispense Instructions for use Diagnosis    aspirin 81 MG tablet      Take 2 tablets by mouth daily with food        hydrochlorothiazide 12.5 MG Tabs tablet      Take 1 tablet by mouth daily        lisinopril 20 MG tablet    PRINIVIL/ZESTRIL    90 tablet    TAKE 1 TABLET DAILY    Benign essential hypertension       MULTI-VITAMINS Tabs      Take 1 tablet by mouth daily        SM VITAMIN B-12 500 MCG Tabs   Generic drug:  cyanocobalamin

## 2018-06-28 NOTE — NURSING NOTE
Patient here for right side pain for the past 2 months, would like to have colonoscopy was 9 years prior. Patt Swift LPN .......................6/28/2018  2:14 PM

## 2018-06-29 PROBLEM — K62.5 BLOOD PER RECTUM: Status: ACTIVE | Noted: 2018-06-29

## 2018-06-29 PROBLEM — R10.84 ABDOMINAL PAIN, GENERALIZED: Status: ACTIVE | Noted: 2018-06-29

## 2018-06-29 PROBLEM — Z80.0 FAMILY HISTORY OF COLON CANCER: Status: ACTIVE | Noted: 2018-06-29

## 2018-06-29 ASSESSMENT — ENCOUNTER SYMPTOMS
PSYCHIATRIC NEGATIVE: 1
RESPIRATORY NEGATIVE: 1
FEVER: 0
CHILLS: 0
EYES NEGATIVE: 1
DIZZINESS: 0

## 2018-06-29 NOTE — PROGRESS NOTES
SUBJECTIVE:   Anjel Payton is a 63 year old male who presents to clinic today for the following health issues: Abdominal pain and blood per rectum    HPI Comments: Patient arrives here for abdominal pain and blood per rectum.  He reports is been on and off for the last 2 months.  Recently he had some blood on his tissue.  Patient is concerned about colon cancer.  States his mother had colon cancer i and  at 62 years of age.  Patient had colon evaluation back in  which was normal.  He also reports some generalized abdominal pain.  There is some changes in stooling.  Reports increased frequency.        Patient Active Problem List    Diagnosis Date Noted     Family history of colon cancer mom  62 2018     Priority: Medium     Abdominal pain, generalized 2018     Priority: Medium     Blood per rectum 2018     Priority: Medium     Benign essential hypertension 2018     Priority: Medium     Lumbago 2018     Priority: Medium     Overview:   with L5-S1 disc protrusion, moderate and asymmetric to the right with right S1   nerve rootlet displaced posteriorly       Hypertension 2018     Priority: Medium     Overview:   well controlled on Zestril 5 mgs,       Vegetarian 2014     Priority: Medium     Past Medical History:   Diagnosis Date     Essential (primary) hypertension     No Comments Provided     Other specified postprocedural states     Right supraclavicular lymph node biopsy, benign, age 28      Social History   Substance Use Topics     Smoking status: Former Smoker     Quit date: 10/25/1975     Smokeless tobacco: Former User     Quit date: 10/25/1998     Alcohol use 0.5 oz/week      Comment: 1 weekly      Current Outpatient Prescriptions   Medication Sig Dispense Refill     aspirin 81 MG tablet Take 2 tablets by mouth daily with food       cyanocobalamin (SM VITAMIN B-12) 500 MCG TABS        hydrochlorothiazide 12.5 MG TABS tablet Take 1 tablet by mouth daily        lisinopril (PRINIVIL/ZESTRIL) 20 MG tablet TAKE 1 TABLET DAILY 90 tablet 3     Multiple Vitamin (MULTI-VITAMINS) TABS Take 1 tablet by mouth daily       No Known Allergies    Review of Systems   Constitutional: Negative for chills and fever.   Eyes: Negative.    Respiratory: Negative.    Cardiovascular: Negative for chest pain.   Genitourinary: Negative.    Neurological: Negative for dizziness.   Psychiatric/Behavioral: Negative.         OBJECTIVE:     /80  Pulse 60  Wt 176 lb (79.8 kg)  BMI 24.55 kg/m2  Body mass index is 24.55 kg/(m^2).  Physical Exam   Constitutional: He is oriented to person, place, and time. He appears well-developed and well-nourished. No distress.   HENT:   Head: Normocephalic and atraumatic.   Right Ear: External ear normal.   Left Ear: External ear normal.   Mouth/Throat: No oropharyngeal exudate.   Eyes: Pupils are equal, round, and reactive to light.   Cardiovascular: Normal rate, regular rhythm and normal heart sounds.    Pulmonary/Chest: Effort normal and breath sounds normal. No respiratory distress.   Abdominal: Soft. Bowel sounds are normal.   Musculoskeletal: Normal range of motion.   Neurological: He is alert and oriented to person, place, and time.   Psychiatric: He has a normal mood and affect.       none     ASSESSMENT/PLAN:         1. Family history of colon cancer mom  62    - GASTROENTEROLOGY ADULT REF PROCEDURE ONLY    2. Abdominal pain, generalized    - GASTROENTEROLOGY ADULT REF PROCEDURE ONLY    3. Blood per rectum    - GASTROENTEROLOGY ADULT REF PROCEDURE ONLY  Discussed with the patient that even though he is slightly early and his colonoscopy he has increased risk factors and I believe it is appropriate to proceed with colonoscopy.  Patient is agreeable.    Donny Trivedi MD  [facility]

## 2018-07-03 ENCOUNTER — TELEPHONE (OUTPATIENT)
Dept: FAMILY MEDICINE | Facility: OTHER | Age: 63
End: 2018-07-03

## 2018-07-05 ENCOUNTER — TELEPHONE (OUTPATIENT)
Dept: SURGERY | Facility: OTHER | Age: 63
End: 2018-07-05

## 2018-07-05 DIAGNOSIS — Z00.00 HEALTHCARE MAINTENANCE: Primary | ICD-10-CM

## 2018-07-05 NOTE — TELEPHONE ENCOUNTER
Patient referred by Dr. Trivedi for a diagnostic colonoscopy ,  Diagnosis is abdominal pain , rectal bleeding and family history of colon cancer.  Please advise.  Thank you.  Jessy Craven on 7/5/2018 at 9:33 AM

## 2018-07-05 NOTE — TELEPHONE ENCOUNTER
Attempted to call both numbers, no answer and unable to leave a message at either number. Patt Swift LPN .......................7/5/2018  8:11 AM

## 2018-07-05 NOTE — TELEPHONE ENCOUNTER
Called and spoke with patient he already got the information that he needed through surgery for colonoscopy. Patt Swift LPN .......................7/5/2018  4:51 PM

## 2018-07-05 NOTE — TELEPHONE ENCOUNTER
Screening Questions for the Scheduling of Screening Colonoscopies   (If Colonoscopy is diagnostic, Provider should review the chart before scheduling.)  Are you younger than 50 or older than 80?   NO   Do you take aspirin or fish oil?   ASPIRIN   (if yes, tell patient to stop 1 week prior to Colonoscopy)  Do you take warfarin (Coumadin), clopidogrel (Plavix), apixaban (Eliquis), dabigatram (Pradaxa), rivaroxaban (Xarelto) or any blood thinner?    NO   Do you use oxygen at home?    NO   Do you have kidney disease?   NO   Are you on dialysis?   NO   Have you had a stroke or heart attack in the last year?  NO   Have you had a stent in your heart or any blood vessel in the last year?   NO  Have you had a transplant of any organ?   NO   Have you had a colonoscopy or upper endoscopy (EGD) before?   YES           When?  2007  -  [facility]   Date of scheduled Colonoscopy.   07/16/2018  Provider   WILL   Pharmacy  [place]  Patient requested Dr. Mcclain

## 2018-07-06 ENCOUNTER — TELEPHONE (OUTPATIENT)
Dept: SURGERY | Facility: OTHER | Age: 63
End: 2018-07-06

## 2018-07-09 ENCOUNTER — TELEPHONE (OUTPATIENT)
Dept: SURGERY | Facility: OTHER | Age: 63
End: 2018-07-09

## 2018-07-09 RX ORDER — POLYETHYLENE GLYCOL 3350, SODIUM CHLORIDE, SODIUM BICARBONATE, POTASSIUM CHLORIDE 420; 11.2; 5.72; 1.48 G/4L; G/4L; G/4L; G/4L
4000 POWDER, FOR SOLUTION ORAL ONCE
Qty: 4000 ML | Refills: 0 | Status: SHIPPED | OUTPATIENT
Start: 2018-07-09 | End: 2018-07-09

## 2018-07-09 RX ORDER — BISACODYL 5 MG
TABLET, DELAYED RELEASE (ENTERIC COATED) ORAL
Qty: 2 TABLET | Refills: 0 | Status: ON HOLD | OUTPATIENT
Start: 2018-07-09 | End: 2018-07-16

## 2018-07-16 ENCOUNTER — HOSPITAL ENCOUNTER (OUTPATIENT)
Facility: OTHER | Age: 63
Discharge: HOME | End: 2018-07-16
Attending: SURGERY | Admitting: SURGERY
Payer: COMMERCIAL

## 2018-07-16 ENCOUNTER — ANESTHESIA (OUTPATIENT)
Dept: SURGERY | Facility: OTHER | Age: 63
End: 2018-07-16
Payer: COMMERCIAL

## 2018-07-16 ENCOUNTER — ANESTHESIA EVENT (OUTPATIENT)
Dept: SURGERY | Facility: OTHER | Age: 63
End: 2018-07-16
Payer: COMMERCIAL

## 2018-07-16 ENCOUNTER — SURGERY (OUTPATIENT)
Age: 63
End: 2018-07-16

## 2018-07-16 VITALS
RESPIRATION RATE: 18 BRPM | OXYGEN SATURATION: 93 % | TEMPERATURE: 98.2 F | WEIGHT: 175 LBS | SYSTOLIC BLOOD PRESSURE: 129 MMHG | DIASTOLIC BLOOD PRESSURE: 84 MMHG | HEIGHT: 71 IN | BODY MASS INDEX: 24.5 KG/M2 | HEART RATE: 56 BPM

## 2018-07-16 PROBLEM — K63.5 COLON POLYPS: Status: ACTIVE | Noted: 2018-07-16

## 2018-07-16 PROCEDURE — 25000128 H RX IP 250 OP 636: Performed by: SURGERY

## 2018-07-16 PROCEDURE — 45380 COLONOSCOPY AND BIOPSY: CPT | Performed by: SURGERY

## 2018-07-16 PROCEDURE — 45384 COLONOSCOPY W/LESION REMOVAL: CPT | Performed by: SURGERY

## 2018-07-16 PROCEDURE — 25000128 H RX IP 250 OP 636: Performed by: NURSE ANESTHETIST, CERTIFIED REGISTERED

## 2018-07-16 PROCEDURE — 25000125 ZZHC RX 250: Performed by: NURSE ANESTHETIST, CERTIFIED REGISTERED

## 2018-07-16 PROCEDURE — 25000132 ZZH RX MED GY IP 250 OP 250 PS 637: Performed by: SURGERY

## 2018-07-16 PROCEDURE — 45384 COLONOSCOPY W/LESION REMOVAL: CPT | Performed by: NURSE ANESTHETIST, CERTIFIED REGISTERED

## 2018-07-16 PROCEDURE — 88305 TISSUE EXAM BY PATHOLOGIST: CPT | Performed by: SURGERY

## 2018-07-16 PROCEDURE — 27210995 ZZH RX 272: Performed by: SURGERY

## 2018-07-16 RX ORDER — SIMETHICONE
LIQUID (ML) MISCELLANEOUS PRN
Status: DISCONTINUED | OUTPATIENT
Start: 2018-07-16 | End: 2018-07-16 | Stop reason: HOSPADM

## 2018-07-16 RX ORDER — PROPOFOL 10 MG/ML
INJECTION, EMULSION INTRAVENOUS PRN
Status: DISCONTINUED | OUTPATIENT
Start: 2018-07-16 | End: 2018-07-16

## 2018-07-16 RX ORDER — SODIUM CHLORIDE, SODIUM LACTATE, POTASSIUM CHLORIDE, CALCIUM CHLORIDE 600; 310; 30; 20 MG/100ML; MG/100ML; MG/100ML; MG/100ML
INJECTION, SOLUTION INTRAVENOUS CONTINUOUS
Status: DISCONTINUED | OUTPATIENT
Start: 2018-07-16 | End: 2018-07-16 | Stop reason: HOSPADM

## 2018-07-16 RX ORDER — LIDOCAINE HYDROCHLORIDE 20 MG/ML
INJECTION, SOLUTION INFILTRATION; PERINEURAL PRN
Status: DISCONTINUED | OUTPATIENT
Start: 2018-07-16 | End: 2018-07-16

## 2018-07-16 RX ORDER — ONDANSETRON 2 MG/ML
4 INJECTION INTRAMUSCULAR; INTRAVENOUS
Status: DISCONTINUED | OUTPATIENT
Start: 2018-07-16 | End: 2018-07-16 | Stop reason: HOSPADM

## 2018-07-16 RX ORDER — LIDOCAINE 40 MG/G
CREAM TOPICAL
Status: DISCONTINUED | OUTPATIENT
Start: 2018-07-16 | End: 2018-07-16 | Stop reason: HOSPADM

## 2018-07-16 RX ORDER — FLUMAZENIL 0.1 MG/ML
0.2 INJECTION, SOLUTION INTRAVENOUS
Status: DISCONTINUED | OUTPATIENT
Start: 2018-07-16 | End: 2018-07-16 | Stop reason: HOSPADM

## 2018-07-16 RX ORDER — NALOXONE HYDROCHLORIDE 0.4 MG/ML
.1-.4 INJECTION, SOLUTION INTRAMUSCULAR; INTRAVENOUS; SUBCUTANEOUS
Status: DISCONTINUED | OUTPATIENT
Start: 2018-07-16 | End: 2018-07-16 | Stop reason: HOSPADM

## 2018-07-16 RX ORDER — PROPOFOL 10 MG/ML
INJECTION, EMULSION INTRAVENOUS CONTINUOUS PRN
Status: DISCONTINUED | OUTPATIENT
Start: 2018-07-16 | End: 2018-07-16

## 2018-07-16 RX ADMIN — PROPOFOL 160 MCG/KG/MIN: 10 INJECTION, EMULSION INTRAVENOUS at 08:30

## 2018-07-16 RX ADMIN — WATER 200 ML: 1 IRRIGANT IRRIGATION at 08:45

## 2018-07-16 RX ADMIN — Medication 1.5 ML: at 08:45

## 2018-07-16 RX ADMIN — SODIUM CHLORIDE, SODIUM LACTATE, POTASSIUM CHLORIDE, AND CALCIUM CHLORIDE: 600; 310; 30; 20 INJECTION, SOLUTION INTRAVENOUS at 07:21

## 2018-07-16 RX ADMIN — PROPOFOL 80 MG: 10 INJECTION, EMULSION INTRAVENOUS at 08:30

## 2018-07-16 RX ADMIN — LIDOCAINE HYDROCHLORIDE 60 MG: 20 INJECTION, SOLUTION INFILTRATION; PERINEURAL at 08:30

## 2018-07-16 NOTE — H&P (VIEW-ONLY)
SUBJECTIVE:   Anjel Payton is a 63 year old male who presents to clinic today for the following health issues: Abdominal pain and blood per rectum    HPI Comments: Patient arrives here for abdominal pain and blood per rectum.  He reports is been on and off for the last 2 months.  Recently he had some blood on his tissue.  Patient is concerned about colon cancer.  States his mother had colon cancer i and  at 62 years of age.  Patient had colon evaluation back in  which was normal.  He also reports some generalized abdominal pain.  There is some changes in stooling.  Reports increased frequency.        Patient Active Problem List    Diagnosis Date Noted     Family history of colon cancer mom  62 2018     Priority: Medium     Abdominal pain, generalized 2018     Priority: Medium     Blood per rectum 2018     Priority: Medium     Benign essential hypertension 2018     Priority: Medium     Lumbago 2018     Priority: Medium     Overview:   with L5-S1 disc protrusion, moderate and asymmetric to the right with right S1   nerve rootlet displaced posteriorly       Hypertension 2018     Priority: Medium     Overview:   well controlled on Zestril 5 mgs,       Vegetarian 2014     Priority: Medium     Past Medical History:   Diagnosis Date     Essential (primary) hypertension     No Comments Provided     Other specified postprocedural states     Right supraclavicular lymph node biopsy, benign, age 28      Social History   Substance Use Topics     Smoking status: Former Smoker     Quit date: 10/25/1975     Smokeless tobacco: Former User     Quit date: 10/25/1998     Alcohol use 0.5 oz/week      Comment: 1 weekly      Current Outpatient Prescriptions   Medication Sig Dispense Refill     aspirin 81 MG tablet Take 2 tablets by mouth daily with food       cyanocobalamin (SM VITAMIN B-12) 500 MCG TABS        hydrochlorothiazide 12.5 MG TABS tablet Take 1 tablet by mouth daily        lisinopril (PRINIVIL/ZESTRIL) 20 MG tablet TAKE 1 TABLET DAILY 90 tablet 3     Multiple Vitamin (MULTI-VITAMINS) TABS Take 1 tablet by mouth daily       No Known Allergies    Review of Systems   Constitutional: Negative for chills and fever.   Eyes: Negative.    Respiratory: Negative.    Cardiovascular: Negative for chest pain.   Genitourinary: Negative.    Neurological: Negative for dizziness.   Psychiatric/Behavioral: Negative.         OBJECTIVE:     /80  Pulse 60  Wt 176 lb (79.8 kg)  BMI 24.55 kg/m2  Body mass index is 24.55 kg/(m^2).  Physical Exam   Constitutional: He is oriented to person, place, and time. He appears well-developed and well-nourished. No distress.   HENT:   Head: Normocephalic and atraumatic.   Right Ear: External ear normal.   Left Ear: External ear normal.   Mouth/Throat: No oropharyngeal exudate.   Eyes: Pupils are equal, round, and reactive to light.   Cardiovascular: Normal rate, regular rhythm and normal heart sounds.    Pulmonary/Chest: Effort normal and breath sounds normal. No respiratory distress.   Abdominal: Soft. Bowel sounds are normal.   Musculoskeletal: Normal range of motion.   Neurological: He is alert and oriented to person, place, and time.   Psychiatric: He has a normal mood and affect.       none     ASSESSMENT/PLAN:         1. Family history of colon cancer mom  62    - GASTROENTEROLOGY ADULT REF PROCEDURE ONLY    2. Abdominal pain, generalized    - GASTROENTEROLOGY ADULT REF PROCEDURE ONLY    3. Blood per rectum    - GASTROENTEROLOGY ADULT REF PROCEDURE ONLY  Discussed with the patient that even though he is slightly early and his colonoscopy he has increased risk factors and I believe it is appropriate to proceed with colonoscopy.  Patient is agreeable.    Donny Trivedi MD  [facility]

## 2018-07-16 NOTE — INTERVAL H&P NOTE
The history and physical has been reviewed and the patient has been examined.  There are no interim changes to the patient's history or physical condition.  Assessment: Abdominal pain with episode of rectal bleeding.  Plan: Colonoscopy. Patient understands risks to include bleeding, perforation, potential incomplete examination and wishes to proceed.  Jorge Mcclain MD

## 2018-07-16 NOTE — DISCHARGE INSTRUCTIONS
[facility]   Adult Discharge Orders & Instructions     For 12 hours after surgery    1. Get plenty of rest.  A responsible adult must stay with you for at least 12 hours after you leave the hospital.   2. Do not drive or use heavy equipment.  If you have weakness or tingling, don't drive or use heavy equipment until this feeling goes away.  3. Do not drink alcohol.  4. Avoid strenuous or risky activities.  Ask for help when climbing stairs.   5. You may feel lightheaded.  IF so, sit for a few minutes before standing.  Have someone help you get up.   6. If you have nausea (feel sick to your stomach): Drink only clear liquids such as apple juice, ginger ale, broth or 7-Up.  Rest may also help.  Be sure to drink enough fluids.  Move to a regular diet as you feel able.  7. You may have a slight fever. Call the doctor if your fever is over 101 F (38.3 C) (taken under the tongue) or lasts longer than 24 hours.  8. You may have a dry mouth, a sore throat, muscle aches or trouble sleeping.  These should go away after 24 hours.  9. Do not make important or legal decisions.   Call your doctor for any of the followin.  Signs of infection (fever, growing tenderness at the surgery site, a large amount of drainage or bleeding, severe pain, foul-smelling drainage, redness, swelling).    2. It has been over 8 to 10 hours since surgery and you are still not able to urinate (pass water).    3.  Headache for over 24 hours.    4.  Numbness, tingling or weakness the day after surgery (if you had spinal anesthesia).  To contact a doctor, call _________[phone]_______________________

## 2018-07-16 NOTE — OP NOTE
PROCEDURE NOTE    DATE OF SERVICE: 7/16/2018    SURGEON: Jorge Mcclain MD    PRE-OP DIAGNOSIS:    Family history of colon cancer  Rectal bleeding      POST-OP DIAGNOSIS:  Same  Polyps at HF. Mid TC, SF, 30 cm and rectum    PROCEDURE:   Colonoscopy with hot biopsy      ANESTHESIA:  AARON Lawler CRNA    INDICATION FOR THE PROCEDURE: The patient is a 63 year old male with episode of rectal bleeding and FH colon cancer . The patient has no other complaints  . After explaining the risks to include bleeding, perforation, potential inability toreach the cecum, the patient wished to proceed.    PROCEDURE:After adequate sedation, the patient was in the left lateral decubitus position.  Rectal exam was performed.  There was normal tone and no palpable masses , no fissures.  The colonoscope was introduced into the rectum and advanced to the cecum with Mild difficulty.  The patient's prep was good.  The terminal cecum was reached.  The cecum, ascending, transverse, descending and sigmoid colon was with diminutive polyps at HF, mid TC, SF , 30 cm and upper rectum that were hot biopsied and destroyed .  The scope was retroflexed in the rectum.  The rectum was unremarkable  .  The scope was straightened and removed.  The patient tolerated the procedure well.     ESTIMATED BLOOD LOSS: none    COMPLICATIONS:  None    TISSUE REMOVED:  Yes    RECOMMEND:    Follow-up pending pathology      Jorge Mcclain MD FACS

## 2018-07-16 NOTE — ANESTHESIA CARE TRANSFER NOTE
Patient: Anjel Payton    Procedure(s):   - Wound Class: III-Contaminated    Diagnosis: abdominal pain, rectal bleeding  Diagnosis Additional Information: No value filed.    Anesthesia Type:   MAC     Note:  Airway :Room Air  Patient transferred to:Phase II  Handoff Report: Identifed the Patient, Identified the Reponsible Provider, Reviewed the pertinent medical history, Discussed the surgical course, Reviewed Intra-OP anesthesia mangement and issues during anesthesia, Set expectations for post-procedure period and Allowed opportunity for questions and acknowledgement of understanding      Vitals: (Last set prior to Anesthesia Care Transfer)    CRNA VITALS  7/16/2018 0840 - 7/16/2018 0915      7/16/2018             Resp Rate (set): 10                Electronically Signed By: TIMA Ramirez CRNA  July 16, 2018  9:15 AM

## 2018-07-16 NOTE — ANESTHESIA POSTPROCEDURE EVALUATION
Patient: Anjel Payton    Procedure(s):   - Wound Class: III-Contaminated    Diagnosis:abdominal pain, rectal bleeding  Diagnosis Additional Information: No value filed.    Anesthesia Type:  MAC    Note:  Anesthesia Post Evaluation    Patient location during evaluation: Phase 2 and Bedside  Patient participation: Able to fully participate in evaluation  Level of consciousness: awake and alert  Pain management: adequate  Airway patency: patent  Cardiovascular status: acceptable  Respiratory status: acceptable  Hydration status: acceptable  PONV: none     Anesthetic complications: None          Last vitals:  Vitals:    07/16/18 0705 07/16/18 0911   BP: (!) 143/93 101/59   Pulse: 56    Resp: 18    Temp: 98.2  F (36.8  C) 98.2  F (36.8  C)   SpO2: 98% 93%         Electronically Signed By: TIMA Ramirez CRNA  July 16, 2018  9:44 AM

## 2018-07-16 NOTE — IP AVS SNAPSHOT
MRN:[id number]                      After Visit Summary   7/16/2018    Anjel Payton    MRN: [id number]           Thank you!     Thank you for choosing [facility] for your care. Our goal is always to provide you with excellent care. Hearing back from our patients is one way we can continue to improve our services. Please take a few minutes to complete the written survey that you may receive in the mail after you visit with us. Thank you!        Patient Information     Date Of Birth          1955        About your hospital stay     You were admitted on:  July 16, 2018 You last received care in the:  [facility]    You were discharged on:  July 16, 2018       Who to Call     For medical emergencies, please call 911.  For non-urgent questions about your medical care, please call your primary care provider or clinic, [phone]  For questions related to your surgery, please call your surgery clinic        Attending Provider     Provider Specialty    Jorge Mcclain MD Surgery       Primary Care Provider Office Phone # Fax #    Donny Trivedi [phone]      After Care Instructions     Discharge Instructions       No driving or operating machinery until the day after procedure..postfiber            Discharge Instructions       Resume pre procedure diet and medications.  Your doctor recommends that you eat 25 to 30 Grams of fiber daily. The following are some examples of fiber amounts in different foods.    Fruits: Apple (with skin) 1 medium = 4.4 Grams   Banana      1 medium = 3.1 Grams   Oranges     1 orange = 3.1 Grams   Prunes     1 cup, pitted = 12.4 Grams    Juices: Apple, unsweetened w/ added ascorbic acid  1 cup = 0.5 Grams    Grapefruit, white, canned,sweetened  1 cup = 0.2 Grams    Grape, unsweetened w/ added ascorbic acid  1 cup = 0.5 Grams    Orange     1 cup = 0.7 Grams    Vegetables:   Cooked: Green Beans   1 cup = 4.0 Grams       Carrots   1/2  cup sliced = 2.3 Grams       Peas       1 cup = 8.8 Grams       Potato (baked, with skin)  1 medium = 3.8 Grams    Raw: Cucumber (with peel)  1 cucumber = 1.5 Grams            Lettuce     1 cup shredded = 0.5 Grams            Tomato   1 medium tomato = 1.5 Grams            Spinach  1 cup = 0.7 Grams    Legumes: Baked beans, canned, no salt added  1 cup = 13.9 Grams         Kidney Beans, canned  1 cup = 13.6 Grams         Miller Beans, canned     1 cup = 11.6 Grams         Lentils, boiled   1 cup = 15.6 Grams    Breads, Pastas, Flours: Bran muffins   1 medium muffin = 5.2 Grams           Oatmeal, cooked  1 cup = 4.0 Grams           White Bread   1 slice = 0.6 Grams           Whole- wheat bread = 1.9 Grams    Pasta and rice, cooked: Macaroni  1 cup = 2.5 Grams           Rice, Brown  1 cup = 3.5 Grams           Rice, white   1 cup = 0.6 Grams           Spaghetti (regular) 1 cup = 2.5 Grams    Nuts: Almonds   1 cup = 17.4 Grams            Peanuts    1 cup = 12.4 Grams            Discharge Instructions       Call [phone] for questions or concerns. Call for increased abdominal pain, rectal bleeding, persistent vomiting or fever over 101.5 F  You will receive a letter in about one week with results.                  Further instructions from your care team       [facility]   Adult Discharge Orders & Instructions     For 12 hours after surgery    1. Get plenty of rest.  A responsible adult must stay with you for at least 12 hours after you leave the hospital.   2. Do not drive or use heavy equipment.  If you have weakness or tingling, don't drive or use heavy equipment until this feeling goes away.  3. Do not drink alcohol.  4. Avoid strenuous or risky activities.  Ask for help when climbing stairs.   5. You may feel lightheaded.  IF so, sit for a few minutes before standing.  Have someone help you get up.   6. If you have nausea (feel sick to your stomach): Drink only clear liquids such as apple juice, yash  "mary grace, broth or 7-Up.  Rest may also help.  Be sure to drink enough fluids.  Move to a regular diet as you feel able.  7. You may have a slight fever. Call the doctor if your fever is over 101 F (38.3 C) (taken under the tongue) or lasts longer than 24 hours.  8. You may have a dry mouth, a sore throat, muscle aches or trouble sleeping.  These should go away after 24 hours.  9. Do not make important or legal decisions.   Call your doctor for any of the followin.  Signs of infection (fever, growing tenderness at the surgery site, a large amount of drainage or bleeding, severe pain, foul-smelling drainage, redness, swelling).    2. It has been over 8 to 10 hours since surgery and you are still not able to urinate (pass water).    3.  Headache for over 24 hours.    4.  Numbness, tingling or weakness the day after surgery (if you had spinal anesthesia).  To contact a doctor, call _________[phone]_______________________    Pending Results     No orders found from 2018 to 2018.            Admission Information     Date & Time Provider Department Dept. Phone    2018 Jorge Mcclain MD [facility] [phone]      Your Vitals Were     Blood Pressure Pulse Temperature Respirations Height Weight    101/59 56 98.2  F (36.8  C) 18 1.803 m (5' 11\") 79.4 kg (175 lb)    Pulse Oximetry BMI (Body Mass Index)                93% 24.41 kg/m2          [employer] Information     [employer] lets you send messages to your doctor, view your test results, renew your prescriptions, schedule appointments and more. To sign up, go to [email or web address] . Click on \"Log in\" on the left side of the screen, which will take you to the Welcome page. Then click on \"Sign up Now\" on the right side of the page.     You will be asked to enter the access code listed below, as well as some personal information. Please follow the directions to create your username and password.     Your access code is: " [email or web address]  Expires: 2018  8:07 AM     Your access code will  in 90 days. If you need help or a new code, please call your [place] clinic or [phone].        Care EveryWhere ID     This is your Care EveryWhere ID. This could be used by other organizations to access your [place] medical records  [id number]        Equal Access to Services     [facility] Center[facility] : Hadii aad ku hadasho Soomaali, waaxda luqadaha, qaybta kaalmada adeegyada, waxay idiin hayaan adeenzo zarate laZulayneryn . So [facility] [phone].    ATENCIÓN: Si habla español, tiene a perdue disposición servicios gratuitos de asistencia lingüística. Llame al [phone].    We comply with applicable federal civil rights laws and [place] laws. We do not discriminate on the basis of race, color, national origin, age, disability, sex, sexual orientation, or gender identity.               Review of your medicines      UNREVIEWED medicines. Ask your doctor about these medicines        Dose / Directions    aspirin 81 MG tablet        Dose:  2 tablet   Take 2 tablets by mouth daily with food   Refills:  0       hydrochlorothiazide 12.5 MG Tabs tablet        Dose:  1 tablet   Take 1 tablet by mouth daily   Refills:  0       lisinopril 20 MG tablet   Commonly known as:  PRINIVIL/ZESTRIL   Used for:  Benign essential hypertension        TAKE 1 TABLET DAILY   Quantity:  90 tablet   Refills:  3       MULTI-VITAMINS Tabs        Dose:  1 tablet   Take 1 tablet by mouth daily   Refills:  0       SM VITAMIN B-12 500 MCG Tabs   Generic drug:  cyanocobalamin        Refills:  0                Protect others around you: Learn how to safely use, store and throw away your medicines at www.disposemymeds.org.             Medication List: This is a list of all your medications and when to take them. Check marks below indicate your daily home schedule. Keep this list as a reference.      Medications           Morning Afternoon Evening Bedtime As Needed    aspirin 81 MG tablet    Take 2 tablets by mouth daily with food                                hydrochlorothiazide 12.5 MG Tabs tablet   Take 1 tablet by mouth daily                                lisinopril 20 MG tablet   Commonly known as:  PRINIVIL/ZESTRIL   TAKE 1 TABLET DAILY                                MULTI-VITAMINS Tabs   Take 1 tablet by mouth daily                                SM VITAMIN B-12 500 MCG Tabs   Generic drug:  cyanocobalamin

## 2018-07-16 NOTE — ANESTHESIA PREPROCEDURE EVALUATION
Anesthesia Evaluation     . Pt has had prior anesthetic. Type: General and MAC    No history of anesthetic complications          ROS/MED HX    ENT/Pulmonary:  - neg pulmonary ROS     Neurologic:  - neg neurologic ROS     Cardiovascular:  - neg cardiovascular ROS   (+) hypertension----. : . . . :. .       METS/Exercise Tolerance:  >4 METS   Hematologic:  - neg hematologic  ROS       Musculoskeletal:  - neg musculoskeletal ROS       GI/Hepatic:  - neg GI/hepatic ROS       Renal/Genitourinary:  - ROS Renal section negative       Endo:  - neg endo ROS       Psychiatric:  - neg psychiatric ROS       Infectious Disease:  - neg infectious disease ROS       Malignancy:      - no malignancy   Other:    (+) No chance of pregnancy C-spine cleared: N/A, no H/O Chronic Pain,no other significant disability   - neg other ROS                 Physical Exam  Normal systems: cardiovascular, pulmonary and dental    Airway   Mallampati: II  TM distance: >3 FB  Neck ROM: full    Dental     Cardiovascular       Pulmonary                     Anesthesia Plan      History & Physical Review      ASA Status:  2 .    NPO Status:  > 6 hours    Plan for MAC with Propofol induction.          Postoperative Care      Consents  Anesthetic plan, risks, benefits and alternatives discussed with:  Patient.  Use of blood products discussed: No .   .                          .

## 2018-07-16 NOTE — IP AVS SNAPSHOT
[facility]    [address]    [place] [address]    Phone:  [phone]    Fax:  [phone]                                       After Visit Summary   7/16/2018    Anjel Payton    MRN: [id number]           After Visit Summary Signature Page     I have received my discharge instructions, and my questions have been answered. I have discussed any challenges I see with this plan with the nurse or doctor.    ..........................................................................................................................................  Patient/Patient Representative Signature      ..........................................................................................................................................  Patient Representative Print Name and Relationship to Patient    ..................................................               ................................................  Date                                            Time    ..........................................................................................................................................  Reviewed by Signature/Title    ...................................................              ..............................................  Date                                                            Time

## 2018-07-17 PROBLEM — Z86.0101 H/O ADENOMATOUS POLYP OF COLON: Status: ACTIVE | Noted: 2018-07-16

## 2018-07-18 ENCOUNTER — TELEPHONE (OUTPATIENT)
Dept: SURGERY | Facility: OTHER | Age: 63
End: 2018-07-18

## 2018-07-23 NOTE — PROGRESS NOTES
Patient Information     Patient Name  Anjel Payton MRN  [id number] Sex  Male   1955      Letter by Sanjeev Burns MD at      Author:  Sanjeev Burns MD Service:  (none) Author Type:  (none)    Filed:   Date of Service:   Status:  (Other)           Anjel Payton  [address] [address]          August 3, 2017    Dear Anjel,    Your heart echocardiogram has returned and is completely normal. You do not have any heart abnormalities including no evidence for any valve abnormalities. Congratulations on this report. If you have any questions or concerns, feel free to contact me.    Sincerely,      Sanjeev Burns MD  Internal Medicine  [facility]

## 2018-07-24 NOTE — PROGRESS NOTES
Patient Information     Patient Name  Anjel Payton MRN  [id number] Sex  Male   1955      Letter by Donny Trivedi MD at      Author:  Donny Trivedi MD Service:  (none) Author Type:  (none)    Filed:   Encounter Date:  2017 Status:  (Other)           Anjel Payton  [address] [place] [address]          2017    Dear Mr. Payton:    Enclosed is a copy of your laboratory testing and as you can see everything did come back satisfactory. If your symptoms have not improved with stopping the metoprolol I would recommend a follow-up visit. Please call if you should have any questions.  Results for orders placed or performed in visit on 17       BASIC METABOLIC PANEL       Result  Value Ref Range Status    SODIUM 139 133 - 143 mmol/L Final    POTASSIUM 4.1 3.5 - 5.1 mmol/L Final    CHLORIDE 100 98 - 107 mmol/L Final    CO2,TOTAL 29 21 - 31 mmol/L Final    ANION GAP 10 5 - 18                 Final    GLUCOSE 101 70 - 105 mg/dL Final    CALCIUM 9.9 8.6 - 10.3 mg/dL Final    BUN 19 7 - 25 mg/dL Final    CREATININE 0.71 0.70 - 1.30 mg/dL Final    BUN/CREAT RATIO           27                 Final    GFR if African American >60 >60 ml/min/1.73m2 Final    GFR if not African American >60 >60 ml/min/1.73m2 Final   PSA, TOTAL       Result  Value Ref Range Status    PSA TOTAL (DIAGNOSTIC) 0.891 <=3.100 ng/mL Final   CBC WITH AUTO DIFFERENTIAL       Result  Value Ref Range Status    WHITE BLOOD COUNT         4.2 (L) 4.5 - 11.0 thou/cu mm Final    RED BLOOD COUNT           4.45 4.30 - 5.90 mil/cu mm Final    HEMOGLOBIN                14.6 13.5 - 17.5 g/dL Final    HEMATOCRIT                43.2 37.0 - 53.0 % Final    MCV                       97 80 - 100 fL Final    MCH                       32.8 26.0 - 34.0 pg Final    MCHC                      33.8 32.0 - 36.0 g/dL Final    RDW                       11.8 11.5 - 15.5 % Final    PLATELET COUNT            136 (L) 140 - 440 thou/cu mm Final    MPV                        9.5 6.5 - 11.0 fL Final    NEUTROPHILS               38.3 (L) 42.0 - 72.0 % Final    LYMPHOCYTES               48.7 (H) 20.0 - 44.0 % Final    MONOCYTES                 11.9 <12.0 % Final    EOSINOPHILS               0.2 <8.0 % Final    BASOPHILS                 0.2 <3.0 % Final    IMMATURE GRANULOCYTES(METAS,MYELOS,PROS) 0.7 % Final    ABSOLUTE NEUTROPHILS      1.6 (L) 1.7 - 7.0 thou/cu mm Final    ABSOLUTE LYMPHOCYTES      2.0 0.9 - 2.9 thou/cu mm Final    ABSOLUTE MONOCYTES        0.5 <0.9 thou/cu mm Final    ABSOLUTE EOSINOPHILS      0.0 <0.5 thou/cu mm Final    ABSOLUTE BASOPHILS        0.0 <0.3 thou/cu mm Final    ABSOLUTE IMMATURE GRANULOCYTES(METAS,MYELOS,PROS) 0.0 <=0.3 thou/cu mm Final     Donny Trivedi MD ....................  6/29/2017   12:10 PM

## 2018-07-24 NOTE — PROGRESS NOTES
Patient Information     Patient Name  Anjel Payton MRN  [id number] Sex  Male   1955      Letter by Sanjeev Burns MD at      Author:  Sanjeev Burns MD Service:  (none) Author Type:  (none)    Filed:   Date of Service:   Status:  (Other)           Anjel Payton  [address] [address]          2017    Dear Mr. Payton:    Your recent CT scan has returned and is normal other than the fact that your bladder is quite enlarged, likely due to the fact that your prostate is quite enlarged. I would suggest an appointment with our urologist to discuss this further. If you have any other questions about your results, feel free to contact me otherwise let me know and I will get this appointment scheduled for you.    Sincerely,      Sanjeev Burns MD  Internal Medicine  [facility]

## 2018-07-24 NOTE — PROGRESS NOTES
Patient Information     Patient Name  Anjel Payton MRN  [id number] Sex  Male   1955      Letter by Sanjeev Burns MD at      Author:  Sanjeev Burns MD Service:  (none) Author Type:  (none)    Filed:   Encounter Date:  2018 Status:  (Other)           Anjel Payton  [address] [address]          2018    Dear Mr. Payton:    Your recent heart monitor has returned and was basically normal. No abnormalities of any significance with your heart rate were seen. This is very good news. If you have any questions about your results or if you are having continued problems, be sure to let me know.    Sincerely,      Sanjeev Burns MD  Internal Medicine  [facility]

## 2018-11-27 ENCOUNTER — OFFICE VISIT (OUTPATIENT)
Dept: INTERNAL MEDICINE | Facility: OTHER | Age: 63
End: 2018-11-27
Attending: INTERNAL MEDICINE
Payer: COMMERCIAL

## 2018-11-27 VITALS
WEIGHT: 177 LBS | DIASTOLIC BLOOD PRESSURE: 80 MMHG | HEART RATE: 56 BPM | BODY MASS INDEX: 24.69 KG/M2 | SYSTOLIC BLOOD PRESSURE: 118 MMHG

## 2018-11-27 DIAGNOSIS — I10 BENIGN ESSENTIAL HYPERTENSION: ICD-10-CM

## 2018-11-27 DIAGNOSIS — N42.9 DISEASE OF PROSTATE: ICD-10-CM

## 2018-11-27 DIAGNOSIS — R39.9 LOWER URINARY TRACT SYMPTOMS (LUTS): ICD-10-CM

## 2018-11-27 DIAGNOSIS — R10.11 RUQ ABDOMINAL PAIN: Primary | ICD-10-CM

## 2018-11-27 LAB
ALBUMIN SERPL-MCNC: 4.3 G/DL (ref 3.5–5.7)
ALP SERPL-CCNC: 31 U/L (ref 34–104)
ALT SERPL W P-5'-P-CCNC: 24 U/L (ref 7–52)
ANION GAP SERPL CALCULATED.3IONS-SCNC: 8 MMOL/L (ref 3–14)
AST SERPL W P-5'-P-CCNC: 34 U/L (ref 13–39)
BILIRUB SERPL-MCNC: 0.7 MG/DL (ref 0.3–1)
BUN SERPL-MCNC: 20 MG/DL (ref 7–25)
CALCIUM SERPL-MCNC: 9.7 MG/DL (ref 8.6–10.3)
CHLORIDE SERPL-SCNC: 102 MMOL/L (ref 98–107)
CHOLEST SERPL-MCNC: 140 MG/DL
CO2 SERPL-SCNC: 30 MMOL/L (ref 21–31)
CREAT SERPL-MCNC: 0.72 MG/DL (ref 0.7–1.3)
ERYTHROCYTE [DISTWIDTH] IN BLOOD BY AUTOMATED COUNT: 12.1 % (ref 10–15)
GFR SERPL CREATININE-BSD FRML MDRD: >90 ML/MIN/1.7M2
GLUCOSE SERPL-MCNC: 93 MG/DL (ref 70–105)
HCT VFR BLD AUTO: 43.1 % (ref 40–53)
HDLC SERPL-MCNC: 51 MG/DL (ref 23–92)
HGB BLD-MCNC: 14.6 G/DL (ref 13.3–17.7)
LDLC SERPL CALC-MCNC: 80 MG/DL
MCH RBC QN AUTO: 31.9 PG (ref 26.5–33)
MCHC RBC AUTO-ENTMCNC: 33.9 G/DL (ref 31.5–36.5)
MCV RBC AUTO: 94 FL (ref 78–100)
NONHDLC SERPL-MCNC: 89 MG/DL
PLATELET # BLD AUTO: 152 10E9/L (ref 150–450)
POTASSIUM SERPL-SCNC: 3.9 MMOL/L (ref 3.5–5.1)
PROT SERPL-MCNC: 7.4 G/DL (ref 6.4–8.9)
PSA SERPL-MCNC: 0.51 NG/ML
RBC # BLD AUTO: 4.58 10E12/L (ref 4.4–5.9)
SODIUM SERPL-SCNC: 140 MMOL/L (ref 134–144)
TRIGL SERPL-MCNC: 45 MG/DL
WBC # BLD AUTO: 3.1 10E9/L (ref 4–11)

## 2018-11-27 PROCEDURE — 84153 ASSAY OF PSA TOTAL: CPT | Performed by: INTERNAL MEDICINE

## 2018-11-27 PROCEDURE — 85027 COMPLETE CBC AUTOMATED: CPT | Performed by: INTERNAL MEDICINE

## 2018-11-27 PROCEDURE — 99214 OFFICE O/P EST MOD 30 MIN: CPT | Performed by: INTERNAL MEDICINE

## 2018-11-27 PROCEDURE — 80061 LIPID PANEL: CPT | Performed by: INTERNAL MEDICINE

## 2018-11-27 PROCEDURE — 80053 COMPREHEN METABOLIC PANEL: CPT | Performed by: INTERNAL MEDICINE

## 2018-11-27 PROCEDURE — 36415 COLL VENOUS BLD VENIPUNCTURE: CPT | Performed by: INTERNAL MEDICINE

## 2018-11-27 ASSESSMENT — ENCOUNTER SYMPTOMS
ENDOCRINE NEGATIVE: 1
ALLERGIC/IMMUNOLOGIC NEGATIVE: 1
HEMATOLOGIC/LYMPHATIC NEGATIVE: 1
CONSTITUTIONAL NEGATIVE: 1

## 2018-11-27 ASSESSMENT — PATIENT HEALTH QUESTIONNAIRE - PHQ9: SUM OF ALL RESPONSES TO PHQ QUESTIONS 1-9: 0

## 2018-11-27 NOTE — LETTER
November 27, 2018      Anjel Payton  [address] [place] [address]        Dear Anjel Payton,    Below are the results of your recent labs:    Results for orders placed or performed in visit on 11/27/18   Comprehensive Metabolic Panel   Result Value Ref Range    Sodium 140 134 - 144 mmol/L    Potassium 3.9 3.5 - 5.1 mmol/L    Chloride 102 98 - 107 mmol/L    Carbon Dioxide 30 21 - 31 mmol/L    Anion Gap 8 3 - 14 mmol/L    Glucose 93 70 - 105 mg/dL    Urea Nitrogen 20 7 - 25 mg/dL    Creatinine 0.72 0.70 - 1.30 mg/dL    GFR Estimate >90 >60 mL/min/1.7m2    GFR Estimate If Black >90 >60 mL/min/1.7m2    Calcium 9.7 8.6 - 10.3 mg/dL    Bilirubin Total 0.7 0.3 - 1.0 mg/dL    Albumin 4.3 3.5 - 5.7 g/dL    Protein Total 7.4 6.4 - 8.9 g/dL    Alkaline Phosphatase 31 (L) 34 - 104 U/L    ALT 24 7 - 52 U/L    AST 34 13 - 39 U/L   CBC W PLT No Diff   Result Value Ref Range    WBC 3.1 (L) 4.0 - 11.0 10e9/L    RBC Count 4.58 4.4 - 5.9 10e12/L    Hemoglobin 14.6 13.3 - 17.7 g/dL    Hematocrit 43.1 40.0 - 53.0 %    MCV 94 78 - 100 fl    MCH 31.9 26.5 - 33.0 pg    MCHC 33.9 31.5 - 36.5 g/dL    RDW 12.1 10.0 - 15.0 %    Platelet Count 152 150 - 450 10e9/L   Lipid Panel   Result Value Ref Range    Cholesterol 140 <200 mg/dL    Triglycerides 45 <150 mg/dL    HDL Cholesterol 51 23 - 92 mg/dL    LDL Cholesterol Calculated 80 <100 mg/dL    Non HDL Cholesterol 89 <130 mg/dL   Prostate Specific Antigen GH   Result Value Ref Range    Prostate Specific Antigen 0.512 <3.100 ng/mL        Your blood tests are normal.  Congratulations on this report.    Sincerely,        Sanjeev Burns MD  Internal Medicine  [facility]

## 2018-11-27 NOTE — NURSING NOTE
"The patient is here today to be seen for abdominal pain when exercising.  Nusrat Little LPN on 11/27/2018 at 8:54 AM  Chief Complaint   Patient presents with     Abdominal Pain       Initial /80 (BP Location: Right arm, Patient Position: Sitting, Cuff Size: Adult Large)  Pulse 56  Wt 177 lb (80.3 kg)  BMI 24.69 kg/m2 Estimated body mass index is 24.69 kg/(m^2) as calculated from the following:    Height as of 7/16/18: 5' 11\" (1.803 m).    Weight as of this encounter: 177 lb (80.3 kg).  Medication Reconciliation: complete    Nusrat Little LPN    "

## 2018-11-27 NOTE — PROGRESS NOTES
Chief Complaint:  Abdominal pain.    HPI: This patient is here today with a complaint of abdominal pain.  He has had it for about 4 weeks.  It only seems to bother him when he exercises.  The pain is located in the right upper quadrant area.  He is also concerned [place]  told him that he saw something in this area.  The patient had a colonoscopy earlier this year with a polyp resected.  He had a CT scan done in 2017 showing the gallstones only, those have subsequently been removed.  Nonetheless, he gets this achy kind of pain every time he exerts himself.  He will stop exercising and the pain will go away.  He is worried about this and is here today to have it evaluated further.    Medications are reconciled.  Past medical history, past surgical history, family history and social history are all reviewed and updated today.  He has a history of enlarged prostate and he would like to have a PSA done today as long as we are doing lab work which certainly seems reasonable.    Past Medical History:   Diagnosis Date     Essential (primary) hypertension     No Comments Provided     Lower urinary tract symptoms (LUTS)      Other specified postprocedural states     Right supraclavicular lymph node biopsy, benign, age 28     Tubular adenoma        Past Surgical History:   Procedure Laterality Date     COLONOSCOPY      10/5/09,repeat 2019     COLONOSCOPY N/A 7/16/2018    F/U  2023 tubular adenomas     HERNIA REPAIR      2009,,HERNIA REPAIR,[facility] with mesh     LAPAROSCOPIC CHOLECYSTECTOMY      01/31/2017,Cholecystectomy,Laparoscopic     RELEASE CARPAL TUNNEL      rt       No Known Allergies    Current Outpatient Prescriptions   Medication Sig Dispense Refill     aspirin 81 MG tablet Take 2 tablets by mouth daily with food       cyanocobalamin (SM VITAMIN B-12) 500 MCG TABS Take 1 tablet (500 mcg) by mouth daily 150 tablet      hydrochlorothiazide 12.5 MG TABS tablet Take 1 tablet by  mouth daily       lisinopril (PRINIVIL/ZESTRIL) 20 MG tablet TAKE 1 TABLET DAILY 90 tablet 3     Multiple Vitamin (MULTI-VITAMINS) TABS Take 1 tablet by mouth daily         Social History     Social History     Marital status:      Spouse name: N/A     Number of children: N/A     Years of education: N/A     Occupational History     Not on file.     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    Endocrine: Negative.    Skin: Negative.    Allergic/Immunologic: Negative.    Hematological: Negative.        Physical Exam   Constitutional: He appears well-developed and well-nourished. No distress.   Cardiovascular: Normal rate, regular rhythm and normal heart sounds.    Pulmonary/Chest: Effort normal and breath sounds normal. No respiratory distress. He has no wheezes. He has no rales.   Abdominal: Soft. Bowel sounds are normal. He exhibits no distension and no mass. There is no tenderness. No hernia.   Skin: Skin is warm and dry. He is not diaphoretic.   Nursing note and vitals reviewed.      Assessment:      ICD-10-CM    1. RUQ abdominal pain R10.11 Comprehensive Metabolic Panel     CBC W PLT No Diff     CT Abdomen Pelvis w Contrast   2. Benign essential hypertension I10 Lipid Panel   3. Disease of prostate N42.9 Prostate Specific Antigen GH   4. Lower urinary tract symptoms (LUTS) R39.9        Plan: The etiology for his pain is unclear.  It actually sounds a little bit like intestinal angina but this patient does not have any known vascular disease.  This was  reviewed with him.  He is concerned and the pain is not improving or going away so I did elect to do full lab work as well as a CT scan of the abdomen and I will let him know the results.  If all of these are normal it will be reassuring and watchful waiting would be recommended thereafter.  He will keep me posted as to how things progress as far as the pain.  I am not convinced that he needs a CT angiogram at this time.

## 2018-11-27 NOTE — MR AVS SNAPSHOT
After Visit Summary   11/27/2018    Anjel Payton    MRN: [id number]           Patient Information     Date Of Birth          1955        Visit Information        Provider Department      11/27/2018 9:00 AM Sanjeev Burns MD [facility]        Today's Diagnoses     RUQ abdominal pain    -  1    Benign essential hypertension        Disease of prostate        Lower urinary tract symptoms (LUTS)           Follow-ups after your visit        Your next 10 appointments already scheduled     Nov 30, 2018  8:30 AM CST   (Arrive by 8:15 AM)   CT ABDOMEN PELVIS W CONTRAST with GHCT1, GHCTPREP   [facility] ([facility])    [address]  [place] [id number] [phone]           How do I prepare for my exam? (Food and drink instructions) To prepare: Do not eat or drink for 2 hours before your exam. If you need to take medicine, you may take it with small sips of water. (We may ask you to take liquid medicine as well.)  How do I prepare for my exam? (Other instructions) Please arrive 30 minutes early for your CT.  Once in the department you might be asked to drink water 15-20 minutes prior to your exam.  If indicated you may be asked to drink an oral contrast in advance of your CT.  If this is the case, the imaging team will let you know or be in contact with you prior to your appointment  Patients over 70 or patients with diabetes or kidney problems: If you haven t had a blood test (creatinine test) within the last 30 days, the Cardiologist/Radiologist may require you to get this test prior to your exam.  If you have diabetes:  Continue to take your metformin medication on the day of your exam  What should I wear: Please wear loose clothing, such as a sweat suit or jogging clothes. Avoid snaps, zippers and other metal. We may ask you to undress and put on a hospital gown.  How long does the exam take: Most scans take less than 20  minutes.  What should I bring: Please bring any scans or X-rays taken at other hospitals, if similar tests were done. Also bring a list of your medicines, including vitamins, minerals and over-the-counter drugs. It is safest to leave personal items at home.  Do I need a : No  is needed.  What do I need to tell my doctor? Be sure to tell your doctor: * If you have any allergies. * If there s any chance you are pregnant. * If you are breastfeeding.  What should I do after the exam: No restrictions, You may resume normal activities.  What is this test: A CT (computed tomography) scan is a series of pictures that allows us to look inside your body. The scanner creates images of the body in cross sections, much like slices of bread. This helps us see any problems more clearly. You may receive contrast (X-ray dye) before or during your scan. You will be asked to drink the contrast.  Who should I call with questions: If you have any questions, please call the Imaging Department where you will have your exam. Directions, parking instructions, and other information is available on our website, [email or web address].              Future tests that were ordered for you today     Open Future Orders        Priority Expected Expires Ordered    CT Abdomen Pelvis w Contrast Routine  11/27/2019 11/27/2018            Who to contact     If you have questions or need follow up information about today's clinic visit or your schedule please contact [facility] directly at [phone].  Normal or non-critical lab and imaging results will be communicated to you by MyChart, letter or phone within 4 business days after the clinic has received the results. If you do not hear from us within 7 days, please contact the clinic through [email or web address] or phone. If you have a critical or abnormal lab result, we will notify you by phone as soon as possible.  Submit refill requests through [employer] or call your pharmacy and  "they will forward the refill request to us. Please allow 3 business days for your refill to be completed.          Additional Information About Your Visit        MyChart Information     [email or web address] lets you send messages to your doctor, view your test results, renew your prescriptions, schedule appointments and more. To sign up, go to [email or web address] . Click on \"Log in\" on the left side of the screen, which will take you to the Welcome page. Then click on \"Sign up Now\" on the right side of the page.     You will be asked to enter the access code listed below, as well as some personal information. Please follow the directions to create your username and password.     Your access code is: [email or web address]  Expires: 2019  9:20 AM     Your access code will  in 90 days. If you need help or a new code, please call your [place] clinic or [phone].        Care EveryWhere ID     This is your Care EveryWhere ID. This could be used by other organizations to access your [place] medical records  [id number]        Your Vitals Were     Pulse BMI (Body Mass Index)                56 24.69 kg/m2           Blood Pressure from Last 3 Encounters:   18 118/80   18 129/84   18 128/80    Weight from Last 3 Encounters:   18 80.3 kg (177 lb)   18 79.4 kg (175 lb)   18 79.8 kg (176 lb)              We Performed the Following     CBC W PLT No Diff     Comprehensive Metabolic Panel     Lipid Panel     Prostate Specific Antigen GH          Today's Medication Changes          These changes are accurate as of 18  9:38 AM.  If you have any questions, ask your nurse or doctor.               These medicines have changed or have updated prescriptions.        Dose/Directions    SM VITAMIN B-12 500 MCG Tabs   This may have changed:    - how much to take  - when to take this   Generic drug:  cyanocobalamin   Changed by:  Sanjeev Burns MD        Dose:  1 tablet   Take 1 tablet (500 mcg) by " mouth daily   Quantity:  150 tablet   Refills:  0                Primary Care Provider Office Phone # Fax #    Donny Trivedi [phone] [email or web address]   [facility] [address]        Equal Access to Services     MICHELLE RUELAS : Hadii boby burnett cici Solydia, waaxda luqadaha, qaybta kaalmada adecamron, renard karyin hayaan paragenzo zarate trev zuluaga. So [facility] [phone].    ATENCIÓN: Si habla español, tiene a perdue disposición servicios gratuitos de asistencia lingüística. Llame al [phone].    We comply with applicable federal civil rights laws and [place] laws. We do not discriminate on the basis of race, color, national origin, age, disability, sex, sexual orientation, or gender identity.            Thank you!     Thank you for choosing [facility]  for your care. Our goal is always to provide you with excellent care. Hearing back from our patients is one way we can continue to improve our services. Please take a few minutes to complete the written survey that you may receive in the mail after your visit with us. Thank you!             Your Updated Medication List - Protect others around you: Learn how to safely use, store and throw away your medicines at www.disposemymeds.org.          This list is accurate as of 11/27/18  9:38 AM.  Always use your most recent med list.                   Brand Name Dispense Instructions for use Diagnosis    aspirin 81 MG tablet    ASA     Take 2 tablets by mouth daily with food        hydrochlorothiazide 12.5 MG Tabs tablet      Take 1 tablet by mouth daily        lisinopril 20 MG tablet    PRINIVIL/ZESTRIL    90 tablet    TAKE 1 TABLET DAILY    Benign essential hypertension       MULTI-VITAMINS Tabs      Take 1 tablet by mouth daily        SM VITAMIN B-12 500 MCG Tabs   Generic drug:  cyanocobalamin     150 tablet    Take 1 tablet (500 mcg) by mouth daily

## 2018-11-30 ENCOUNTER — HOSPITAL ENCOUNTER (OUTPATIENT)
Dept: CT IMAGING | Facility: OTHER | Age: 63
Discharge: HOME | End: 2018-11-30
Attending: INTERNAL MEDICINE | Admitting: INTERNAL MEDICINE
Payer: COMMERCIAL

## 2018-11-30 DIAGNOSIS — R10.11 RUQ ABDOMINAL PAIN: ICD-10-CM

## 2018-11-30 PROCEDURE — 74177 CT ABD & PELVIS W/CONTRAST: CPT

## 2018-11-30 PROCEDURE — 25500064 ZZH RX 255 OP 636: Performed by: INTERNAL MEDICINE

## 2018-11-30 RX ADMIN — IOHEXOL 100 ML: 350 INJECTION, SOLUTION INTRAVENOUS at 10:22

## 2019-01-24 DIAGNOSIS — I10 BENIGN ESSENTIAL HYPERTENSION: Primary | ICD-10-CM

## 2019-01-25 NOTE — TELEPHONE ENCOUNTER
Routing refill request to provider for review/approval because:  Medication is reported/historical    LOV: 11/27/18  Ebony Constantino RN on 1/25/2019 at 3:05 PM

## 2019-01-28 RX ORDER — HYDROCHLOROTHIAZIDE 12.5 MG/1
TABLET ORAL
Qty: 90 TABLET | Refills: 2 | Status: SHIPPED | OUTPATIENT
Start: 2019-01-28 | End: 2019-07-01

## 2019-03-06 DIAGNOSIS — I10 BENIGN ESSENTIAL HYPERTENSION: ICD-10-CM

## 2019-03-07 RX ORDER — LISINOPRIL 20 MG/1
TABLET ORAL
Qty: 90 TABLET | Refills: 1 | Status: SHIPPED | OUTPATIENT
Start: 2019-03-07 | End: 2019-07-01

## 2019-03-07 NOTE — TELEPHONE ENCOUNTER
[facility] sent Rx request for the following:      LISINOPRIL TABS 20MG  Sig: TAKE 1 TABLET DAILY  Last Prescription Date:   3/26/18  Last Fill Qty/Refills:         90, R-3    Last Office Visit:              11/27/18 ([facility])  Future Office visit:           None.    Prescription approved per [facility] Refill Protocol for 90 days and 1 refill at this time. Makayla Xiao RN .............. 3/7/2019  11:29 AM

## 2019-04-08 ENCOUNTER — TELEPHONE (OUTPATIENT)
Dept: SURGERY | Facility: OTHER | Age: 64
End: 2019-04-08

## 2019-04-08 ENCOUNTER — OFFICE VISIT (OUTPATIENT)
Dept: INTERNAL MEDICINE | Facility: OTHER | Age: 64
End: 2019-04-08
Attending: INTERNAL MEDICINE
Payer: COMMERCIAL

## 2019-04-08 ENCOUNTER — HOSPITAL ENCOUNTER (OUTPATIENT)
Dept: GENERAL RADIOLOGY | Facility: OTHER | Age: 64
Discharge: HOME | End: 2019-04-08
Attending: INTERNAL MEDICINE | Admitting: INTERNAL MEDICINE
Payer: COMMERCIAL

## 2019-04-08 VITALS
SYSTOLIC BLOOD PRESSURE: 118 MMHG | RESPIRATION RATE: 16 BRPM | WEIGHT: 175 LBS | DIASTOLIC BLOOD PRESSURE: 76 MMHG | BODY MASS INDEX: 24.5 KG/M2 | HEIGHT: 71 IN | TEMPERATURE: 97.3 F | HEART RATE: 56 BPM

## 2019-04-08 DIAGNOSIS — R39.9 LOWER URINARY TRACT SYMPTOMS (LUTS): ICD-10-CM

## 2019-04-08 DIAGNOSIS — Z86.0101 H/O ADENOMATOUS POLYP OF COLON: ICD-10-CM

## 2019-04-08 DIAGNOSIS — I10 BENIGN ESSENTIAL HYPERTENSION: Primary | ICD-10-CM

## 2019-04-08 DIAGNOSIS — Z00.00 HEALTH CARE MAINTENANCE: ICD-10-CM

## 2019-04-08 DIAGNOSIS — R13.10 DYSPHAGIA, UNSPECIFIED TYPE: ICD-10-CM

## 2019-04-08 PROCEDURE — 90670 PCV13 VACCINE IM: CPT | Performed by: INTERNAL MEDICINE

## 2019-04-08 PROCEDURE — 86803 HEPATITIS C AB TEST: CPT | Performed by: INTERNAL MEDICINE

## 2019-04-08 PROCEDURE — 99215 OFFICE O/P EST HI 40 MIN: CPT | Mod: 25 | Performed by: INTERNAL MEDICINE

## 2019-04-08 PROCEDURE — 90471 IMMUNIZATION ADMIN: CPT | Performed by: INTERNAL MEDICINE

## 2019-04-08 PROCEDURE — 36415 COLL VENOUS BLD VENIPUNCTURE: CPT | Performed by: INTERNAL MEDICINE

## 2019-04-08 PROCEDURE — 71046 X-RAY EXAM CHEST 2 VIEWS: CPT

## 2019-04-08 SDOH — HEALTH STABILITY: MENTAL HEALTH: HOW OFTEN DO YOU HAVE A DRINK CONTAINING ALCOHOL?: 2-4 TIMES A MONTH

## 2019-04-08 SDOH — HEALTH STABILITY: MENTAL HEALTH: HOW MANY STANDARD DRINKS CONTAINING ALCOHOL DO YOU HAVE ON A TYPICAL DAY?: 1 OR 2

## 2019-04-08 ASSESSMENT — ENCOUNTER SYMPTOMS
DIARRHEA: 0
NAUSEA: 0
SEIZURES: 0
COUGH: 0
HEMATURIA: 0
LIGHT-HEADEDNESS: 0
NERVOUS/ANXIOUS: 0
UNEXPECTED WEIGHT CHANGE: 0
WOUND: 0
BACK PAIN: 0
NUMBNESS: 0
NECK PAIN: 0
BLOOD IN STOOL: 0
SORE THROAT: 0
RHINORRHEA: 0
TROUBLE SWALLOWING: 0
APPETITE CHANGE: 0
EYE PAIN: 0
CONSTIPATION: 0
AGITATION: 0
JOINT SWELLING: 0
EYE REDNESS: 0
WHEEZING: 0
SPEECH DIFFICULTY: 0
ADENOPATHY: 0
CHEST TIGHTNESS: 0
FREQUENCY: 0
ABDOMINAL PAIN: 0
HEADACHES: 0
WEAKNESS: 0
SLEEP DISTURBANCE: 0
DIAPHORESIS: 0
TREMORS: 0
FLANK PAIN: 0
FEVER: 0
SINUS PAIN: 0
SHORTNESS OF BREATH: 0
BRUISES/BLEEDS EASILY: 0
NECK STIFFNESS: 0
SINUS PRESSURE: 0
VOICE CHANGE: 0
DIZZINESS: 0
PALPITATIONS: 0
FATIGUE: 0
DYSURIA: 0
CHILLS: 0
ABDOMINAL DISTENTION: 0
CONFUSION: 0
DIFFICULTY URINATING: 0
HALLUCINATIONS: 0
VOMITING: 0

## 2019-04-08 ASSESSMENT — PATIENT HEALTH QUESTIONNAIRE - PHQ9: SUM OF ALL RESPONSES TO PHQ QUESTIONS 1-9: 0

## 2019-04-08 ASSESSMENT — MIFFLIN-ST. JEOR: SCORE: 1597.98

## 2019-04-08 NOTE — NURSING NOTE
"The patient is here today to be seen for a refill on medications.  Nusrat Little LPN on 4/8/2019 at 1:00 PM  Chief Complaint   Patient presents with     Recheck Medication       Initial /76 (BP Location: Right arm, Patient Position: Sitting, Cuff Size: Adult Regular)   Pulse 56   Temp 97.3  F (36.3  C) (Tympanic)   Resp 16   Ht 1.791 m (5' 10.5\")   Wt 79.4 kg (175 lb)   BMI 24.75 kg/m   Estimated body mass index is 24.75 kg/m  as calculated from the following:    Height as of this encounter: 1.791 m (5' 10.5\").    Weight as of this encounter: 79.4 kg (175 lb).  Medication Reconciliation: complete    Nusrat Little LPN    "

## 2019-04-08 NOTE — LETTER
April 10, 2019      Anjel Payton  [address] [id number]        Dear Anjel Payton,    Below are the results of your recent labs:    Results for orders placed or performed in visit on 04/08/19   Hepatitis C antibody   Result Value Ref Range    Hepatitis C Antibody Nonreactive NR^Nonreactive        Your hepatitis C testing is negative.    Sincerely,        Sanjeev Burns MD  Internal Medicine  [facility] [place]

## 2019-04-08 NOTE — H&P (VIEW-ONLY)
Chief Complaint:  This patient is here for a comprehensive review of their multiple medical problems, renewal of medications and update on necessary health maintenance issues.      HPI: He comes in today for complete evaluation and review of his chronic medical problems.  He has a history of hypertension.  He is on 2 drug therapy for control of his blood pressure.  He tolerates his medications well and he has good control of his blood pressure.  He does not have any known endorgan disease.  The patient also has a history of lower urinary tract symptoms.  He is not on any sort of specific therapy at this time for treatment of that.  He has a history of adenomatous colonic polyps.  Colonoscopy is up-to-date.  He does have a family history of colon cancer in his mother.    The patient is having some dysphasia.  He seems to have difficulty swallowing some foods and he also has more reflux symptoms and even some voice changes.  He has not lost any weight with this at all.  He has not tried anything for this.  He finds it concerning.    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  He wants to be tested for hepatitis C.  He has questions about other immunizations including shingles vaccine which I recommend that he get.  We could certainly consider starting pneumococcal immunizations, etc. on him as well.    Medications are reconciled.  Past medical history, past surgical history, family history and social histories are all reviewed and updated.    Past Medical History:   Diagnosis Date     Essential (primary) hypertension     No Comments Provided     Lower urinary tract symptoms (LUTS)      Tubular adenoma        Past Surgical History:   Procedure Laterality Date     COLONOSCOPY      10/5/09     COLONOSCOPY N/A 7/16/2018    F/U  2023 tubular adenomas     HERNIA REPAIR, UMBILICAL  2009     LAPAROSCOPIC CHOLECYSTECTOMY  01/2017     RELEASE CARPAL TUNNEL Right         Current Outpatient Medications   Medication Sig Dispense Refill     aspirin 81 MG tablet Take 2 tablets by mouth daily with food       cyanocobalamin (SM VITAMIN B-12) 500 MCG TABS Take 1 tablet (500 mcg) by mouth daily 150 tablet      hydrochlorothiazide (HYDRODIURIL) 12.5 MG tablet TAKE 1 TABLET DAILY 90 tablet 2     lisinopril (PRINIVIL/ZESTRIL) 20 MG tablet TAKE 1 TABLET DAILY 90 tablet 1     Multiple Vitamin (MULTI-VITAMINS) TABS Take 1 tablet by mouth daily         No Known Allergies    Family History   Problem Relation Age of Onset     Colon Cancer Mother          age 60 or 61     Diabetes Father         Diabetes, post renal transplant secondary to diabetic complications,     Hypertension Father      Heart Disease Father          of myocardial infarction at age 57     Heart Disease Brother         With bicuspid aortic valve, status post aortic valve replacement     Breast Cancer Sister        Social History     Socioeconomic History     Marital status:      Spouse name: Not on file     Number of children: Not on file     Years of education: Not on file     Highest education level: Not on file   Occupational History     Not on file   Social Needs     Financial resource strain: Not on file     Food insecurity:     Worry: Not on file     Inability: Not on file     Transportation needs:     Medical: Not on file     Non-medical: Not on file   Tobacco Use     Smoking status: Former Smoker     Last attempt to quit: 10/25/1975     Years since quittin.4     Smokeless tobacco: Former User     Quit date: 10/25/1998   Substance and Sexual Activity     Alcohol use: Yes     Alcohol/week: 0.5 oz     Frequency: 2-4 times a month     Drinks per session: 1 or 2     Comment: 1 weekly      Drug use: No     Comment: Drug use: No     Sexual activity: Not on file   Lifestyle     Physical activity:     Days per week: Not on file     Minutes per session: Not on file     Stress: Not on file   Relationships      Social connections:     Talks on phone: Not on file     Gets together: Not on file     Attends [employer] service: Not on file     Active member of club or organization: Not on file     Attends meetings of clubs or organizations: Not on file     Relationship status: Not on file     Intimate partner violence:     Fear of current or ex partner: Not on file     Emotionally abused: Not on file     Physically abused: Not on file     Forced sexual activity: Not on file   Other Topics Concern     Parent/sibling w/ CABG, MI or angioplasty before 65F 55M? Not Asked   Social History Narrative    Vegetarian.  , retired from  the mines.  Rides bike daily.  Lives in [place].       Review of Systems   Constitutional: Negative for appetite change, chills, diaphoresis, fatigue, fever and unexpected weight change.   HENT: Negative for ear pain, rhinorrhea, sinus pressure, sinus pain, sore throat, trouble swallowing and voice change.    Eyes: Negative for pain, redness and visual disturbance.   Respiratory: Negative for cough, chest tightness, shortness of breath and wheezing.    Cardiovascular: Negative for chest pain, palpitations and leg swelling.   Gastrointestinal: Negative for abdominal distention, abdominal pain, blood in stool, constipation, diarrhea, nausea and vomiting.   Endocrine: Negative for cold intolerance and heat intolerance.   Genitourinary: Negative for difficulty urinating, dysuria, flank pain, frequency and hematuria.   Musculoskeletal: Negative for back pain, joint swelling, neck pain and neck stiffness.   Skin: Negative for rash and wound.   Allergic/Immunologic: Negative for immunocompromised state.   Neurological: Negative for dizziness, tremors, seizures, syncope, speech difficulty, weakness, light-headedness, numbness and headaches.   Hematological: Negative for adenopathy. Does not bruise/bleed easily.   Psychiatric/Behavioral: Negative for agitation, behavioral problems, confusion,  hallucinations and sleep disturbance. The patient is not nervous/anxious.        Physical Exam   Constitutional: He is oriented to person, place, and time. He appears well-developed and well-nourished. No distress.   HENT:   Head: Normocephalic.   Right Ear: External ear normal.   Left Ear: External ear normal.   Nose: Nose normal.   Mouth/Throat: Oropharynx is clear and moist. No oropharyngeal exudate.   Eyes: Pupils are equal, round, and reactive to light. Conjunctivae are normal.   Neck: Normal range of motion. Neck supple. Normal carotid pulses and no JVD present. Carotid bruit is not present. No tracheal deviation present. No thyromegaly present.   Cardiovascular: Normal rate, regular rhythm and normal heart sounds. Exam reveals no gallop and no friction rub.   No murmur heard.  Pulmonary/Chest: Effort normal and breath sounds normal. No stridor. No respiratory distress. He has no wheezes. He has no rales.   Abdominal: Soft. Bowel sounds are normal. He exhibits no distension and no mass. There is no tenderness. There is no rebound and no guarding. No hernia.   Genitourinary: Rectum normal, prostate normal and penis normal.   Musculoskeletal: Normal range of motion. He exhibits no edema.   Lymphadenopathy:     He has no cervical adenopathy.   Neurological: He is alert and oriented to person, place, and time. He displays normal reflexes. No cranial nerve deficit or sensory deficit. He exhibits normal muscle tone. Coordination normal.   Skin: Skin is warm and dry. No rash noted. He is not diaphoretic.   Psychiatric: He has a normal mood and affect.   Nursing note and vitals reviewed.      Assessment:      ICD-10-CM    1. Benign essential hypertension I10    2. Lower urinary tract symptoms (LUTS) R39.9    3. H/O adenomatous polyp of colon Z86.010    4. Dysphagia, unspecified type R13.10 GASTROENTEROLOGY ADULT REF PROCEDURE ONLY     XR Chest 2 Views   5. Health care maintenance Z00.00 Hepatitis C antibody         Plan: His exam today is normal.  His chest x-ray is normal.  We will do a hepatitis C antibody and I will send him a letter with the results.  Prevnar given today.  We consider doing the ShingRx vaccine but he wants to wait on this which I think seems reasonable.  Continue current medications without change, he did not need refills.  All other lab testing was already up-to-date.  He will be scheduled for upper GI endoscopy for further evaluation of his swallowing difficulties, follow-up with me thereafter depending on the results and his findings and continued symptoms if any.

## 2019-04-08 NOTE — PROGRESS NOTES
Chief Complaint:  This patient is here for a comprehensive review of their multiple medical problems, renewal of medications and update on necessary health maintenance issues.      HPI: He comes in today for complete evaluation and review of his chronic medical problems.  He has a history of hypertension.  He is on 2 drug therapy for control of his blood pressure.  He tolerates his medications well and he has good control of his blood pressure.  He does not have any known endorgan disease.  The patient also has a history of lower urinary tract symptoms.  He is not on any sort of specific therapy at this time for treatment of that.  He has a history of adenomatous colonic polyps.  Colonoscopy is up-to-date.  He does have a family history of colon cancer in his mother.    The patient is having some dysphasia.  He seems to have difficulty swallowing some foods and he also has more reflux symptoms and even some voice changes.  He has not lost any weight with this at all.  He has not tried anything for this.  He finds it concerning.    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  He wants to be tested for hepatitis C.  He has questions about other immunizations including shingles vaccine which I recommend that he get.  We could certainly consider starting pneumococcal immunizations, etc. on him as well.    Medications are reconciled.  Past medical history, past surgical history, family history and social histories are all reviewed and updated.    Past Medical History:   Diagnosis Date     Essential (primary) hypertension     No Comments Provided     Lower urinary tract symptoms (LUTS)      Tubular adenoma        Past Surgical History:   Procedure Laterality Date     COLONOSCOPY      10/5/09     COLONOSCOPY N/A 7/16/2018    F/U  2023 tubular adenomas     HERNIA REPAIR, UMBILICAL  2009     LAPAROSCOPIC CHOLECYSTECTOMY  01/2017     RELEASE CARPAL TUNNEL Right         Current Outpatient Medications   Medication Sig Dispense Refill     aspirin 81 MG tablet Take 2 tablets by mouth daily with food       cyanocobalamin (SM VITAMIN B-12) 500 MCG TABS Take 1 tablet (500 mcg) by mouth daily 150 tablet      hydrochlorothiazide (HYDRODIURIL) 12.5 MG tablet TAKE 1 TABLET DAILY 90 tablet 2     lisinopril (PRINIVIL/ZESTRIL) 20 MG tablet TAKE 1 TABLET DAILY 90 tablet 1     Multiple Vitamin (MULTI-VITAMINS) TABS Take 1 tablet by mouth daily         No Known Allergies    Family History   Problem Relation Age of Onset     Colon Cancer Mother          age 60 or 61     Diabetes Father         Diabetes, post renal transplant secondary to diabetic complications,     Hypertension Father      Heart Disease Father          of myocardial infarction at age 57     Heart Disease Brother         With bicuspid aortic valve, status post aortic valve replacement     Breast Cancer Sister        Social History     Socioeconomic History     Marital status:      Spouse name: Not on file     Number of children: Not on file     Years of education: Not on file     Highest education level: Not on file   Occupational History     Not on file   Social Needs     Financial resource strain: Not on file     Food insecurity:     Worry: Not on file     Inability: Not on file     Transportation needs:     Medical: Not on file     Non-medical: Not on file   Tobacco Use     Smoking status: Former Smoker     Last attempt to quit: 10/25/1975     Years since quittin.4     Smokeless tobacco: Former User     Quit date: 10/25/1998   Substance and Sexual Activity     Alcohol use: Yes     Alcohol/week: 0.5 oz     Frequency: 2-4 times a month     Drinks per session: 1 or 2     Comment: 1 weekly      Drug use: No     Comment: Drug use: No     Sexual activity: Not on file   Lifestyle     Physical activity:     Days per week: Not on file     Minutes per session: Not on file     Stress: Not on file   Relationships      Social connections:     Talks on phone: Not on file     Gets together: Not on file     Attends Nondenominational service: Not on file     Active member of club or organization: Not on file     Attends meetings of clubs or organizations: Not on file     Relationship status: Not on file     Intimate partner violence:     Fear of current or ex partner: Not on file     Emotionally abused: Not on file     Physically abused: Not on file     Forced sexual activity: Not on file   Other Topics Concern     Parent/sibling w/ CABG, MI or angioplasty before 65F 55M? Not Asked   Social History Narrative    Vegetarian.  , retired from  the mines.  Rides bike daily.  Lives in [place].       Review of Systems   Constitutional: Negative for appetite change, chills, diaphoresis, fatigue, fever and unexpected weight change.   HENT: Negative for ear pain, rhinorrhea, sinus pressure, sinus pain, sore throat, trouble swallowing and voice change.    Eyes: Negative for pain, redness and visual disturbance.   Respiratory: Negative for cough, chest tightness, shortness of breath and wheezing.    Cardiovascular: Negative for chest pain, palpitations and leg swelling.   Gastrointestinal: Negative for abdominal distention, abdominal pain, blood in stool, constipation, diarrhea, nausea and vomiting.   Endocrine: Negative for cold intolerance and heat intolerance.   Genitourinary: Negative for difficulty urinating, dysuria, flank pain, frequency and hematuria.   Musculoskeletal: Negative for back pain, joint swelling, neck pain and neck stiffness.   Skin: Negative for rash and wound.   Allergic/Immunologic: Negative for immunocompromised state.   Neurological: Negative for dizziness, tremors, seizures, syncope, speech difficulty, weakness, light-headedness, numbness and headaches.   Hematological: Negative for adenopathy. Does not bruise/bleed easily.   Psychiatric/Behavioral: Negative for agitation, behavioral problems, confusion,  hallucinations and sleep disturbance. The patient is not nervous/anxious.        Physical Exam   Constitutional: He is oriented to person, place, and time. He appears well-developed and well-nourished. No distress.   HENT:   Head: Normocephalic.   Right Ear: External ear normal.   Left Ear: External ear normal.   Nose: Nose normal.   Mouth/Throat: Oropharynx is clear and moist. No oropharyngeal exudate.   Eyes: Pupils are equal, round, and reactive to light. Conjunctivae are normal.   Neck: Normal range of motion. Neck supple. Normal carotid pulses and no JVD present. Carotid bruit is not present. No tracheal deviation present. No thyromegaly present.   Cardiovascular: Normal rate, regular rhythm and normal heart sounds. Exam reveals no gallop and no friction rub.   No murmur heard.  Pulmonary/Chest: Effort normal and breath sounds normal. No stridor. No respiratory distress. He has no wheezes. He has no rales.   Abdominal: Soft. Bowel sounds are normal. He exhibits no distension and no mass. There is no tenderness. There is no rebound and no guarding. No hernia.   Genitourinary: Rectum normal, prostate normal and penis normal.   Musculoskeletal: Normal range of motion. He exhibits no edema.   Lymphadenopathy:     He has no cervical adenopathy.   Neurological: He is alert and oriented to person, place, and time. He displays normal reflexes. No cranial nerve deficit or sensory deficit. He exhibits normal muscle tone. Coordination normal.   Skin: Skin is warm and dry. No rash noted. He is not diaphoretic.   Psychiatric: He has a normal mood and affect.   Nursing note and vitals reviewed.      Assessment:      ICD-10-CM    1. Benign essential hypertension I10    2. Lower urinary tract symptoms (LUTS) R39.9    3. H/O adenomatous polyp of colon Z86.010    4. Dysphagia, unspecified type R13.10 GASTROENTEROLOGY ADULT REF PROCEDURE ONLY     XR Chest 2 Views   5. Health care maintenance Z00.00 Hepatitis C antibody         Plan: His exam today is normal.  His chest x-ray is normal.  We will do a hepatitis C antibody and I will send him a letter with the results.  Prevnar given today.  We consider doing the ShingRx vaccine but he wants to wait on this which I think seems reasonable.  Continue current medications without change, he did not need refills.  All other lab testing was already up-to-date.  He will be scheduled for upper GI endoscopy for further evaluation of his swallowing difficulties, follow-up with me thereafter depending on the results and his findings and continued symptoms if any.

## 2019-04-09 NOTE — TELEPHONE ENCOUNTER
Okay to schedule   Principal Discharge DX:	Acute on chronic combined systolic (congestive) and diastolic (congestive) heart failure  Goal:	continue medications  Instructions for follow-up, activity and diet:	follow up with your PMD in one week - call for appointment  Secondary Diagnosis:	AF (atrial fibrillation)  Secondary Diagnosis:	Cardiomyopathy  Secondary Diagnosis:	Chronic hypotension  Secondary Diagnosis:	ESRD (end stage renal disease) on dialysis Principal Discharge DX:	Acute on chronic combined systolic (congestive) and diastolic (congestive) heart failure  Goal:	remain euvolemic  Instructions for follow-up, activity and diet:	continue cardiac medications as prescribed, low salt diet, daily weights, fluid restriction,  follow up with your Cardiologist in one week - call for appointment  Secondary Diagnosis:	AF (atrial fibrillation)  Instructions for follow-up, activity and diet:	your coumadin was discontinued secondary to developing hematomas, continue amiodarone for rate control  Secondary Diagnosis:	Cardiomyopathy  Instructions for follow-up, activity and diet:	continue cardiac medications as prescribed, low salt diet, daily weights, fluid restriction,  follow up with your Cardiologist in one week - call for appointment  Secondary Diagnosis:	Chronic hypotension  Instructions for follow-up, activity and diet:	continue midodrine  Secondary Diagnosis:	ESRD (end stage renal disease) on dialysis  Instructions for follow-up, activity and diet:	continue your normal hemodialysis session in the community on Mon, Wed, Fri, renal diet  Secondary Diagnosis:	Pulmonary fibrosis  Instructions for follow-up, activity and diet:	follow up with Pulmonologist Dr. Coffman or your outpatient Pulmonologist Dr. Malcolm in 1-2 weeks, continue BIPAP at bedtime

## 2019-04-10 ENCOUNTER — TELEPHONE (OUTPATIENT)
Dept: SURGERY | Facility: OTHER | Age: 64
End: 2019-04-10

## 2019-04-10 LAB — HCV AB SERPL QL IA: NONREACTIVE

## 2019-04-10 NOTE — TELEPHONE ENCOUNTER
Screening Questions for the Scheduling of Screening Colonoscopies   (If Colonoscopy is diagnostic, Provider should review the chart before scheduling.)  Are you younger than 50 or older than 80?  NO  Do you take aspirin or fish oil?  YES -- ASA (if yes, tell patient to stop 1 week prior to Colonoscopy)  Do you take warfarin (Coumadin), clopidogrel (Plavix), apixaban (Eliquis), dabigatram (Pradaxa), rivaroxaban (Xarelto) or any blood thinner? NO  Do you use oxygen at home?  NO  Do you have kidney disease? NO  Are you on dialysis? NO  Have you had a stroke or heart attack in the last year? NO  Have you had a stent in your heart or any blood vessel in the last year? NO  Have you had a transplant of any organ? NO  Have you had a upper endoscopy (EGD) before? NO         When?  NO  Date of scheduled EGD. NO  Provider [facility]  [facility]

## 2019-04-24 PROBLEM — R13.10 DYSPHAGIA: Status: ACTIVE | Noted: 2019-04-24

## 2019-04-29 ENCOUNTER — HOSPITAL ENCOUNTER (OUTPATIENT)
Facility: OTHER | Age: 64
Discharge: HOME | End: 2019-04-29
Attending: SURGERY | Admitting: SURGERY
Payer: COMMERCIAL

## 2019-04-29 ENCOUNTER — ANESTHESIA EVENT (OUTPATIENT)
Dept: SURGERY | Facility: OTHER | Age: 64
End: 2019-04-29
Payer: COMMERCIAL

## 2019-04-29 ENCOUNTER — ANESTHESIA (OUTPATIENT)
Dept: SURGERY | Facility: OTHER | Age: 64
End: 2019-04-29
Payer: COMMERCIAL

## 2019-04-29 VITALS
WEIGHT: 171.6 LBS | DIASTOLIC BLOOD PRESSURE: 95 MMHG | TEMPERATURE: 98 F | RESPIRATION RATE: 16 BRPM | SYSTOLIC BLOOD PRESSURE: 126 MMHG | OXYGEN SATURATION: 99 % | BODY MASS INDEX: 24.27 KG/M2 | HEART RATE: 49 BPM

## 2019-04-29 DIAGNOSIS — K25.3 ACUTE ULCER OF PYLORIC ANTRUM: Primary | ICD-10-CM

## 2019-04-29 PROCEDURE — 25000125 ZZHC RX 250: Performed by: SURGERY

## 2019-04-29 PROCEDURE — 43239 EGD BIOPSY SINGLE/MULTIPLE: CPT | Performed by: NURSE ANESTHETIST, CERTIFIED REGISTERED

## 2019-04-29 PROCEDURE — 88305 TISSUE EXAM BY PATHOLOGIST: CPT

## 2019-04-29 PROCEDURE — 43239 EGD BIOPSY SINGLE/MULTIPLE: CPT | Performed by: SURGERY

## 2019-04-29 PROCEDURE — 25000125 ZZHC RX 250: Performed by: NURSE ANESTHETIST, CERTIFIED REGISTERED

## 2019-04-29 PROCEDURE — 40000010 ZZH STATISTIC ANES STAT CODE-CRNA PER MINUTE: Performed by: SURGERY

## 2019-04-29 PROCEDURE — 25000128 H RX IP 250 OP 636: Performed by: NURSE ANESTHETIST, CERTIFIED REGISTERED

## 2019-04-29 PROCEDURE — 25000132 ZZH RX MED GY IP 250 OP 250 PS 637: Performed by: SURGERY

## 2019-04-29 PROCEDURE — 25800030 ZZH RX IP 258 OP 636: Performed by: SURGERY

## 2019-04-29 RX ORDER — NALOXONE HYDROCHLORIDE 0.4 MG/ML
.1-.4 INJECTION, SOLUTION INTRAMUSCULAR; INTRAVENOUS; SUBCUTANEOUS
Status: DISCONTINUED | OUTPATIENT
Start: 2019-04-29 | End: 2019-04-29 | Stop reason: HOSPADM

## 2019-04-29 RX ORDER — SODIUM CHLORIDE, SODIUM LACTATE, POTASSIUM CHLORIDE, CALCIUM CHLORIDE 600; 310; 30; 20 MG/100ML; MG/100ML; MG/100ML; MG/100ML
INJECTION, SOLUTION INTRAVENOUS CONTINUOUS
Status: DISCONTINUED | OUTPATIENT
Start: 2019-04-29 | End: 2019-04-29 | Stop reason: HOSPADM

## 2019-04-29 RX ORDER — OMEPRAZOLE 40 MG/1
40 CAPSULE, DELAYED RELEASE ORAL DAILY
Qty: 60 CAPSULE | Refills: 0 | Status: SHIPPED | OUTPATIENT
Start: 2019-04-29 | End: 2019-07-01

## 2019-04-29 RX ORDER — ONDANSETRON 2 MG/ML
4 INJECTION INTRAMUSCULAR; INTRAVENOUS
Status: DISCONTINUED | OUTPATIENT
Start: 2019-04-29 | End: 2019-04-29 | Stop reason: HOSPADM

## 2019-04-29 RX ORDER — PROPOFOL 10 MG/ML
INJECTION, EMULSION INTRAVENOUS PRN
Status: DISCONTINUED | OUTPATIENT
Start: 2019-04-29 | End: 2019-04-29

## 2019-04-29 RX ORDER — PROPOFOL 10 MG/ML
INJECTION, EMULSION INTRAVENOUS CONTINUOUS PRN
Status: DISCONTINUED | OUTPATIENT
Start: 2019-04-29 | End: 2019-04-29

## 2019-04-29 RX ORDER — LIDOCAINE 40 MG/G
CREAM TOPICAL
Status: DISCONTINUED | OUTPATIENT
Start: 2019-04-29 | End: 2019-04-29 | Stop reason: HOSPADM

## 2019-04-29 RX ORDER — LIDOCAINE HYDROCHLORIDE 20 MG/ML
INJECTION, SOLUTION INFILTRATION; PERINEURAL PRN
Status: DISCONTINUED | OUTPATIENT
Start: 2019-04-29 | End: 2019-04-29

## 2019-04-29 RX ORDER — FLUMAZENIL 0.1 MG/ML
0.2 INJECTION, SOLUTION INTRAVENOUS
Status: DISCONTINUED | OUTPATIENT
Start: 2019-04-29 | End: 2019-04-29 | Stop reason: HOSPADM

## 2019-04-29 RX ORDER — SIMETHICONE
LIQUID (ML) MISCELLANEOUS PRN
Status: DISCONTINUED | OUTPATIENT
Start: 2019-04-29 | End: 2019-04-29 | Stop reason: HOSPADM

## 2019-04-29 RX ADMIN — SODIUM CHLORIDE, POTASSIUM CHLORIDE, SODIUM LACTATE AND CALCIUM CHLORIDE: 600; 310; 30; 20 INJECTION, SOLUTION INTRAVENOUS at 10:24

## 2019-04-29 RX ADMIN — LIDOCAINE HYDROCHLORIDE 80 MG: 20 INJECTION, SOLUTION INFILTRATION; PERINEURAL at 11:46

## 2019-04-29 RX ADMIN — PROPOFOL 100 MG: 10 INJECTION, EMULSION INTRAVENOUS at 11:46

## 2019-04-29 RX ADMIN — PROPOFOL 140 MCG/KG/MIN: 10 INJECTION, EMULSION INTRAVENOUS at 11:46

## 2019-04-29 NOTE — OP NOTE
Procedure note  Date of service: 4/29/2019    Preoperative diagnosis: Dysphagia and epigastric pain    Postoperative diagnosis: Antral ulcer    Procedure:   EGD with biopsy      Anesthesia:   AARON Montenegro CRNA    Indication for the procedure:This is a 63 year old male with dysphagia and epigastric pain.  After explaining the risks to include bleeding, aspiration, perforation, the patient wished to proceed.    Procedure:After adequate sedation, the patient was in the left lateral decubitus position.  The esophagoscope was passed under direct vision into the esophagus and onto the second portion of the duodenum.  The duodenum was unremarkable and biopsied.  The antrum was erythematous and striped and with an ulcer that was photographed.  Biopsies were taken from the antrum to look for occult H. Pylori.  The scope was retroflexed.  The GE junction and fundus were unremarkable .  Scope was straightened and pulled back.  The distal esophagus was with a minimally irregular z-line .  Biopsies x 8 were taken from the distal esophagus.  The remaining esophagus was unremarkable . The scope was removed.The patient tolerated the procedure well.    Recommendations:    Avoid NSAID's and PPI for 6-8 weeks    Surgeon: Jorge Mcclain MD FACS    Referring physician:  Sanjeev Burns

## 2019-04-29 NOTE — INTERVAL H&P NOTE
The history and physical has been reviewed and the patient has been examined.  There are no interim changes to the patient's history or physical condition.    Jorge Mcclain MD

## 2019-04-29 NOTE — ANESTHESIA POSTPROCEDURE EVALUATION
Patient: Anjel Payton    Procedure(s):  ESOPHAGOGASTRODUODENOSCOPY, WITH BIOPSY    Diagnosis:dysphagia unspecified  Diagnosis Additional Information: No value filed.    Anesthesia Type:  MAC    Note:  Anesthesia Post Evaluation    Patient location during evaluation: Phase 2  Patient participation: Able to fully participate in evaluation  Level of consciousness: awake and alert  Pain management: adequate  Airway patency: patent  Cardiovascular status: acceptable  Respiratory status: acceptable  Hydration status: acceptable  PONV: none     Anesthetic complications: None          Last vitals:  Vitals:    04/29/19 1007   BP: 139/87   Resp: 16   Temp: 98  F (36.7  C)   SpO2: 98%         Electronically Signed By: TIMA HUSTON CRNA  April 29, 2019  12:09 PM

## 2019-04-29 NOTE — ANESTHESIA PREPROCEDURE EVALUATION
Anesthesia Evaluation     . Pt has had prior anesthetic. Type: General and MAC    No history of anesthetic complications          ROS/MED HX    ENT/Pulmonary:  - neg pulmonary ROS     Neurologic:  - neg neurologic ROS     Cardiovascular:  - neg cardiovascular ROS   (+) hypertension----. : . . . :. .       METS/Exercise Tolerance:  >4 METS   Hematologic:  - neg hematologic  ROS       Musculoskeletal:  - neg musculoskeletal ROS       GI/Hepatic:  - neg GI/hepatic ROS       Renal/Genitourinary:  - ROS Renal section negative       Endo:  - neg endo ROS       Psychiatric:  - neg psychiatric ROS       Infectious Disease:  - neg infectious disease ROS       Malignancy:      - no malignancy   Other:    (+) No chance of pregnancy C-spine cleared: N/A, no H/O Chronic Pain,no other significant disability   - neg other ROS                 Physical Exam  Normal systems: cardiovascular, pulmonary and dental    Airway   Mallampati: II  TM distance: >3 FB  Neck ROM: full    Dental     Cardiovascular   Rhythm and rate: regular and normal      Pulmonary    breath sounds clear to auscultation                        Anesthesia Plan      History & Physical Review      ASA Status:  2 .    NPO Status:  > 6 hours    Plan for MAC with Propofol induction.          Postoperative Care      Consents  Anesthetic plan, risks, benefits and alternatives discussed with:  Patient.  Use of blood products discussed: No .   .                          .

## 2019-04-29 NOTE — ANESTHESIA CARE TRANSFER NOTE
Patient: Anjel Payton    Procedure(s):  ESOPHAGOGASTRODUODENOSCOPY, WITH BIOPSY    Diagnosis: dysphagia unspecified  Diagnosis Additional Information: No value filed.    Anesthesia Type:   MAC     Note:  Airway :Nasal Cannula  Patient transferred to:Phase II  Handoff Report: Identifed the Patient, Identified the Reponsible Provider, Reviewed the pertinent medical history, Discussed the surgical course, Reviewed Intra-OP anesthesia mangement and issues during anesthesia, Set expectations for post-procedure period and Allowed opportunity for questions and acknowledgement of understanding      Vitals: (Last set prior to Anesthesia Care Transfer)    CRNA VITALS  4/29/2019 1134 - 4/29/2019 1208      4/29/2019             Resp Rate (set):  10                Electronically Signed By: TIMA HUSTON CRNA  April 29, 2019  12:08 PM

## 2019-04-29 NOTE — DISCHARGE INSTRUCTIONS
[facility]  Adult Discharge Orders & Instructions    ________________________________________________________________          For 12 hours after surgery  1. Get plenty of rest.  A responsible adult must stay with you for at least 12 hours after you leave the hospital.   2. You may feel lightheaded.  IF so, sit for a few minutes before standing.  Have someone help you get up.   3. You may have a slight fever. Call the doctor if your fever is over 101 F (38.3 C) (taken under the tongue) or lasts longer than 24 hours.  4. You may have a dry mouth, a sore throat, muscle aches or trouble sleeping.  These should go away after 24 hours.  5. Do not make important or legal decisions.  6.   Do not drive or use heavy equipment.     To contact a doctor, call:   [phone]

## 2019-05-01 DIAGNOSIS — B96.81 HELICOBACTER PYLORI GASTRITIS: Primary | ICD-10-CM

## 2019-05-01 DIAGNOSIS — K29.70 HELICOBACTER PYLORI GASTRITIS: Primary | ICD-10-CM

## 2019-05-01 PROBLEM — K21.00 GASTROESOPHAGEAL REFLUX DISEASE WITH ESOPHAGITIS: Status: ACTIVE | Noted: 2019-05-01

## 2019-05-01 RX ORDER — CLARITHROMYCIN 500 MG
500 TABLET ORAL 2 TIMES DAILY
Qty: 28 TABLET | Refills: 0 | Status: SHIPPED | OUTPATIENT
Start: 2019-05-01 | End: 2019-07-01

## 2019-05-01 RX ORDER — AMOXICILLIN 875 MG
875 TABLET ORAL 2 TIMES DAILY
Qty: 28 TABLET | Refills: 0 | Status: SHIPPED | OUTPATIENT
Start: 2019-05-01 | End: 2019-07-01

## 2019-06-04 ENCOUNTER — APPOINTMENT (OUTPATIENT)
Dept: LAB | Facility: OTHER | Age: 64
End: 2019-06-04
Attending: SURGERY
Payer: MEDICARE

## 2019-06-05 DIAGNOSIS — B96.81 HELICOBACTER PYLORI GASTRITIS: ICD-10-CM

## 2019-06-05 DIAGNOSIS — K29.70 HELICOBACTER PYLORI GASTRITIS: ICD-10-CM

## 2019-06-05 LAB
H PYLORI AG STL QL IA: NORMAL
SPECIMEN SOURCE: NORMAL

## 2019-06-05 PROCEDURE — 87338 HPYLORI STOOL AG IA: CPT | Performed by: SURGERY

## 2019-07-01 ENCOUNTER — OFFICE VISIT (OUTPATIENT)
Dept: INTERNAL MEDICINE | Facility: OTHER | Age: 64
End: 2019-07-01
Attending: INTERNAL MEDICINE
Payer: MEDICARE

## 2019-07-01 VITALS
HEART RATE: 46 BPM | SYSTOLIC BLOOD PRESSURE: 122 MMHG | OXYGEN SATURATION: 98 % | DIASTOLIC BLOOD PRESSURE: 82 MMHG | RESPIRATION RATE: 16 BRPM | TEMPERATURE: 98 F | BODY MASS INDEX: 25.18 KG/M2 | WEIGHT: 178 LBS

## 2019-07-01 DIAGNOSIS — I10 BENIGN ESSENTIAL HYPERTENSION: ICD-10-CM

## 2019-07-01 DIAGNOSIS — K25.3 ACUTE GASTRIC ULCER DUE TO HELICOBACTER PYLORI: ICD-10-CM

## 2019-07-01 DIAGNOSIS — B96.81 ACUTE GASTRIC ULCER DUE TO HELICOBACTER PYLORI: ICD-10-CM

## 2019-07-01 PROCEDURE — 99213 OFFICE O/P EST LOW 20 MIN: CPT | Performed by: INTERNAL MEDICINE

## 2019-07-01 PROCEDURE — G0463 HOSPITAL OUTPT CLINIC VISIT: HCPCS

## 2019-07-01 RX ORDER — HYDROCHLOROTHIAZIDE 12.5 MG/1
12.5 TABLET ORAL DAILY
Qty: 90 TABLET | Refills: 2 | COMMUNITY
Start: 2019-07-01 | End: 2019-07-09

## 2019-07-01 RX ORDER — LISINOPRIL 20 MG/1
20 TABLET ORAL DAILY
Qty: 90 TABLET | Refills: 1 | COMMUNITY
Start: 2019-07-01 | End: 2019-07-09

## 2019-07-01 ASSESSMENT — ENCOUNTER SYMPTOMS
ALLERGIC/IMMUNOLOGIC NEGATIVE: 1
EYES NEGATIVE: 1
ENDOCRINE NEGATIVE: 1
CONSTITUTIONAL NEGATIVE: 1

## 2019-07-01 ASSESSMENT — PAIN SCALES - GENERAL: PAINLEVEL: NO PAIN (0)

## 2019-07-01 NOTE — NURSING NOTE
"Chief Complaint   Patient presents with     Hospital F/U     lab results       Initial /82   Pulse (!) 46   Temp 98  F (36.7  C) (Tympanic)   Resp 16   Wt 80.7 kg (178 lb)   SpO2 98%   BMI 25.18 kg/m   Estimated body mass index is 25.18 kg/m  as calculated from the following:    Height as of 4/8/19: 1.791 m (5' 10.5\").    Weight as of this encounter: 80.7 kg (178 lb).  Medication Reconciliation: complete    Morena Anderson LPN  "

## 2019-07-01 NOTE — PROGRESS NOTES
Chief Complaint: Follow-up on stomach pain.    HPI: When he was in to see me for a physical, he was complaining of some stomach pain and swallowing difficulties.  He underwent upper GI endoscopy which revealed Helicobacter gastritis.  He was treated appropriately and subsequent stool testing was negative.  He now feels normal and is having no further symptoms and no pain and no swallowing difficulties.  He wanted to go over his medications wondering if he could take a baby aspirin daily, I assured him that he could.  He wonders if he needs to continue on the omeprazole, I told him that he did not.    He has a little bit of a flexion contracture on his left fourth finger that he had questions about today.    Past Medical History:   Diagnosis Date     Acute gastric ulcer due to Helicobacter pylori      Essential (primary) hypertension     No Comments Provided     Lower urinary tract symptoms (LUTS)      Tubular adenoma        Past Surgical History:   Procedure Laterality Date     COLONOSCOPY      10/5/09     COLONOSCOPY N/A 7/16/2018    F/U  2023 tubular adenomas     ENDOSCOPY  04/29/2019    EGD antral ulcer     ESOPHAGOSCOPY, GASTROSCOPY, DUODENOSCOPY (EGD), COMBINED N/A 4/29/2019    Procedure: ESOPHAGOGASTRODUODENOSCOPY, WITH BIOPSY;  Surgeon: Jorge Mcclain MD;  Location: [facility] OR     HERNIA REPAIR, UMBILICAL  2009     LAPAROSCOPIC CHOLECYSTECTOMY  01/2017     RELEASE CARPAL TUNNEL Right        No Known Allergies    Current Outpatient Medications   Medication Sig Dispense Refill     aspirin (ASA) 81 MG tablet Take 1 tablet (81 mg) by mouth daily with food       hydrochlorothiazide (HYDRODIURIL) 12.5 MG tablet Take 1 tablet (12.5 mg) by mouth daily 90 tablet 2     lisinopril (PRINIVIL/ZESTRIL) 20 MG tablet Take 1 tablet (20 mg) by mouth daily 90 tablet 1     Multiple Vitamin (MULTI-VITAMINS) TABS Take 1 tablet by mouth daily         Review of Systems   Constitutional: Negative.    Eyes: Negative.    Endocrine: Negative.     Allergic/Immunologic: Negative.        Physical Exam   Constitutional: He appears well-developed and well-nourished. No distress.   Musculoskeletal:   Slight thickening of his left fourth flexor tendon   Skin: He is not diaphoretic.   Nursing note and vitals reviewed.      Assessment:        ICD-10-CM    1. Benign essential hypertension I10 hydrochlorothiazide (HYDRODIURIL) 12.5 MG tablet     lisinopril (PRINIVIL/ZESTRIL) 20 MG tablet   2. Acute gastric ulcer due to Helicobacter pylori K25.3     B96.81        Plan: He appears to be doing well at this time.  Reassured regarding his hand, I do not think he needs intervention at this time.  Follow-up with me will be for his annual physical, sooner if there are problems.

## 2019-07-09 ENCOUNTER — TELEPHONE (OUTPATIENT)
Dept: INTERNAL MEDICINE | Facility: OTHER | Age: 64
End: 2019-07-09

## 2019-07-09 DIAGNOSIS — I10 BENIGN ESSENTIAL HYPERTENSION: ICD-10-CM

## 2019-07-09 RX ORDER — LISINOPRIL 20 MG/1
20 TABLET ORAL DAILY
Qty: 90 TABLET | Refills: 3 | Status: SHIPPED | OUTPATIENT
Start: 2019-07-09 | End: 2020-01-28

## 2019-07-09 RX ORDER — HYDROCHLOROTHIAZIDE 12.5 MG/1
12.5 TABLET ORAL DAILY
Qty: 90 TABLET | Refills: 3 | Status: SHIPPED | OUTPATIENT
Start: 2019-07-09 | End: 2020-01-28

## 2019-07-09 NOTE — TELEPHONE ENCOUNTER
States pt needs new prescriptions for lisinopril and hydrochlorothiazide sent to [facility].

## 2019-12-11 ENCOUNTER — HOSPITAL ENCOUNTER (OUTPATIENT)
Dept: GENERAL RADIOLOGY | Facility: OTHER | Age: 64
Discharge: HOME | End: 2019-12-11
Attending: INTERNAL MEDICINE | Admitting: INTERNAL MEDICINE
Payer: MEDICARE

## 2019-12-11 ENCOUNTER — OFFICE VISIT (OUTPATIENT)
Dept: INTERNAL MEDICINE | Facility: OTHER | Age: 64
End: 2019-12-11
Attending: INTERNAL MEDICINE
Payer: MEDICARE

## 2019-12-11 VITALS
WEIGHT: 173 LBS | DIASTOLIC BLOOD PRESSURE: 82 MMHG | RESPIRATION RATE: 18 BRPM | SYSTOLIC BLOOD PRESSURE: 118 MMHG | HEART RATE: 68 BPM | BODY MASS INDEX: 24.47 KG/M2 | TEMPERATURE: 97.3 F

## 2019-12-11 DIAGNOSIS — G89.29 CHRONIC PAIN OF LEFT KNEE: Primary | ICD-10-CM

## 2019-12-11 DIAGNOSIS — G89.29 CHRONIC PAIN OF LEFT KNEE: ICD-10-CM

## 2019-12-11 DIAGNOSIS — M25.562 CHRONIC PAIN OF LEFT KNEE: ICD-10-CM

## 2019-12-11 DIAGNOSIS — M25.562 CHRONIC PAIN OF LEFT KNEE: Primary | ICD-10-CM

## 2019-12-11 PROCEDURE — G0463 HOSPITAL OUTPT CLINIC VISIT: HCPCS | Mod: 25

## 2019-12-11 PROCEDURE — 99213 OFFICE O/P EST LOW 20 MIN: CPT | Performed by: INTERNAL MEDICINE

## 2019-12-11 PROCEDURE — 73562 X-RAY EXAM OF KNEE 3: CPT | Mod: LT

## 2019-12-11 PROCEDURE — G0463 HOSPITAL OUTPT CLINIC VISIT: HCPCS

## 2019-12-11 ASSESSMENT — ENCOUNTER SYMPTOMS
ENDOCRINE NEGATIVE: 1
EYES NEGATIVE: 1
CONSTITUTIONAL NEGATIVE: 1
ALLERGIC/IMMUNOLOGIC NEGATIVE: 1

## 2019-12-11 NOTE — NURSING NOTE
"The patient is here today to be seen for right knee discomfort.  Nusrat Little LPN on 12/11/2019 at 2:37 PM  Chief Complaint   Patient presents with     Knee Pain     right       Initial /82 (BP Location: Right arm, Patient Position: Sitting, Cuff Size: Adult Large)   Pulse 68   Temp 97.3  F (36.3  C) (Tympanic)   Resp 18   Wt 78.5 kg (173 lb)   BMI 24.47 kg/m   Estimated body mass index is 24.47 kg/m  as calculated from the following:    Height as of 4/8/19: 1.791 m (5' 10.5\").    Weight as of this encounter: 78.5 kg (173 lb).  Medication Reconciliation: complete    Nusrat Little LPN    "

## 2020-01-28 ENCOUNTER — TELEPHONE (OUTPATIENT)
Dept: INTERNAL MEDICINE | Facility: OTHER | Age: 65
End: 2020-01-28

## 2020-01-28 DIAGNOSIS — I10 BENIGN ESSENTIAL HYPERTENSION: ICD-10-CM

## 2020-01-28 RX ORDER — HYDROCHLOROTHIAZIDE 12.5 MG/1
12.5 TABLET ORAL DAILY
Qty: 90 TABLET | Refills: 3 | Status: SHIPPED | OUTPATIENT
Start: 2020-01-28 | End: 2020-08-25

## 2020-01-28 RX ORDER — LISINOPRIL 20 MG/1
20 TABLET ORAL DAILY
Qty: 90 TABLET | Refills: 3 | Status: SHIPPED | OUTPATIENT
Start: 2020-01-28 | End: 2020-08-25

## 2020-01-28 NOTE — TELEPHONE ENCOUNTER
States that pt has to change pharmacies to [facility] due to insurance. Calling to request Rx's to be sent to [facility] as their previous pharmacy won't transfer the prescriptions.

## 2020-01-31 ENCOUNTER — APPOINTMENT (OUTPATIENT)
Dept: CT IMAGING | Facility: OTHER | Age: 65
End: 2020-01-31
Attending: PHYSICIAN ASSISTANT
Payer: MEDICARE

## 2020-01-31 ENCOUNTER — NURSE TRIAGE (OUTPATIENT)
Dept: INTERNAL MEDICINE | Facility: OTHER | Age: 65
End: 2020-01-31

## 2020-01-31 ENCOUNTER — HOSPITAL ENCOUNTER (EMERGENCY)
Facility: OTHER | Age: 65
Discharge: HOME | End: 2020-01-31
Attending: PHYSICIAN ASSISTANT | Admitting: PHYSICIAN ASSISTANT
Payer: MEDICARE

## 2020-01-31 VITALS
WEIGHT: 180 LBS | HEART RATE: 94 BPM | SYSTOLIC BLOOD PRESSURE: 133 MMHG | TEMPERATURE: 96.5 F | HEIGHT: 71 IN | BODY MASS INDEX: 25.2 KG/M2 | DIASTOLIC BLOOD PRESSURE: 46 MMHG | RESPIRATION RATE: 16 BRPM | OXYGEN SATURATION: 99 %
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
## 2020-01-31 DIAGNOSIS — S09.90XA CLOSED HEAD INJURY, INITIAL ENCOUNTER: ICD-10-CM

## 2020-01-31 DIAGNOSIS — F07.81 POST CONCUSSION SYNDROME: ICD-10-CM

## 2020-01-31 PROCEDURE — 99284 EMERGENCY DEPT VISIT MOD MDM: CPT | Mod: 25 | Performed by: PHYSICIAN ASSISTANT

## 2020-01-31 PROCEDURE — 99283 EMERGENCY DEPT VISIT LOW MDM: CPT | Mod: Z6 | Performed by: PHYSICIAN ASSISTANT

## 2020-01-31 PROCEDURE — 70450 CT HEAD/BRAIN W/O DYE: CPT

## 2020-01-31 PROCEDURE — 72125 CT NECK SPINE W/O DYE: CPT

## 2020-01-31 ASSESSMENT — ENCOUNTER SYMPTOMS
NAUSEA: 0
FATIGUE: 0
NECK PAIN: 0
FEVER: 0
EYE PAIN: 0
ACTIVITY CHANGE: 0
WHEEZING: 0
SORE THROAT: 0
LIGHT-HEADEDNESS: 0
CONFUSION: 1
STRIDOR: 0
HEADACHES: 1
DIARRHEA: 0
APPETITE CHANGE: 0
CHEST TIGHTNESS: 0
SPEECH DIFFICULTY: 0
FACIAL SWELLING: 0
BACK PAIN: 0
RHINORRHEA: 0
TREMORS: 0
WEAKNESS: 0
NECK STIFFNESS: 0
SHORTNESS OF BREATH: 0
ABDOMINAL PAIN: 0
FREQUENCY: 0
TROUBLE SWALLOWING: 0
CONSTIPATION: 0
VOMITING: 0
COUGH: 0
FACIAL ASYMMETRY: 0
DIZZINESS: 0
SEIZURES: 0
COLOR CHANGE: 0
DYSURIA: 0

## 2020-01-31 ASSESSMENT — MIFFLIN-ST. JEOR: SCORE: 1623.6

## 2020-01-31 NOTE — ED AVS SNAPSHOT
[facility]  [address]  [place] [address]  Phone:  [phone]  Fax:  [phone]                                    Anjel Payton   MRN: [id number]    Department:  [facility]   Date of Visit:  1/31/2020           After Visit Summary Signature Page    I have received my discharge instructions, and my questions have been answered. I have discussed any challenges I see with this plan with the nurse or doctor.    ..........................................................................................................................................  Patient/Patient Representative Signature      ..........................................................................................................................................  Patient Representative Print Name and Relationship to Patient    ..................................................               ................................................  Date                                   Time    ..........................................................................................................................................  Reviewed by Signature/Title    ...................................................              ..............................................  Date                                               Time          22EPIC Rev 08/18

## 2020-01-31 NOTE — ED TRIAGE NOTES
Pt here with family, pt reports that he fell off his roof almost 2 weeks ago and hit his head on a base of a ladder, pt denies being knocked out pt reports some occasional fogginess, head pressure and HA, pt reports that he is not on blood thinners, pt tried to go to clinic today, but they told him to come here, VSS, pt out into waiting room to wait for ED room

## 2020-01-31 NOTE — ED PROVIDER NOTES
History     Chief Complaint   Patient presents with     Head Injury     This is a 64-year-old male who was cleaning the snow off his roof 2 weeks ago when he slipped and fell off the roof hitting his head on the ground.  He denies any loss of consciousness at the time.  Reports he hit his ladder in a spot rate near the base of his skull.  He had been doing quite well however has noticed some occasional fogginess.  And headache.  He is currently not on any blood thinners.  Today he was at the gym exercising and he had been telling the person next him about his fall.  He then proceeded asking a question and within 10 minutes afterwards asked the person same question.  This person then became nervous and informed the patient's wife.  They try to get into the clinic but were referred to the ER.  Denies any numbness or tingling.  Denies any neck pain.  Denies any visual changes.  No nausea or vomiting.  No balance issues.  No loss of bowel or bladder control.            Allergies:  No Known Allergies    Problem List:    Patient Active Problem List    Diagnosis Date Noted     Acute gastric ulcer due to Helicobacter pylori      Priority: Medium     Gastroesophageal reflux disease with esophagitis 2019     Priority: Medium     Helicobacter pylori gastritis 2019     Priority: Medium     Acute ulcer of  antrum 2019     Priority: Medium     Dysphagia 2019     Priority: Medium     Lower urinary tract symptoms (LUTS) 2018     Priority: Medium     H/O adenomatous polyp of colon 2018     Priority: Medium     Family history of colon cancer mom  62 2018     Priority: Medium     Benign essential hypertension 2018     Priority: Medium     Lumbago 2018     Priority: Medium     Overview:   with L5-S1 disc protrusion, moderate and asymmetric to the right with right S1   nerve rootlet displaced posteriorly       Vegetarian 2014     Priority: Medium        Past Medical  History:    Past Medical History:   Diagnosis Date     Acute gastric ulcer due to Helicobacter pylori      Essential (primary) hypertension      Lower urinary tract symptoms (LUTS)      Tubular adenoma        Past Surgical History:    Past Surgical History:   Procedure Laterality Date     COLONOSCOPY      10/5/09     COLONOSCOPY N/A 2018    F/U   tubular adenomas     ENDOSCOPY  2019    EGD antral ulcer     ESOPHAGOSCOPY, GASTROSCOPY, DUODENOSCOPY (EGD), COMBINED N/A 2019    Procedure: ESOPHAGOGASTRODUODENOSCOPY, WITH BIOPSY;  Surgeon: Jorge Mcclain MD;  Location: [facility] OR     HERNIA REPAIR, UMBILICAL       LAPAROSCOPIC CHOLECYSTECTOMY  2017     RELEASE CARPAL TUNNEL Right        Family History:    Family History   Problem Relation Age of Onset     Colon Cancer Mother          age 60 or 61     Diabetes Father         Diabetes, post renal transplant secondary to diabetic complications,     Hypertension Father      Heart Disease Father          of myocardial infarction at age 57     Heart Disease Brother         With bicuspid aortic valve, status post aortic valve replacement     Breast Cancer Sister        Social History:  Marital Status:   [2]  Social History     Tobacco Use     Smoking status: Former Smoker     Last attempt to quit: 10/25/1975     Years since quittin.2     Smokeless tobacco: Former User     Quit date: 10/25/1998   Substance Use Topics     Alcohol use: Yes     Alcohol/week: 0.8 standard drinks     Frequency: 2-4 times a month     Drinks per session: 1 or 2     Comment: 1 weekly      Drug use: No     Comment: Drug use: No        Medications:    aspirin (ASA) 81 MG tablet  hydrochlorothiazide (HYDRODIURIL) 12.5 MG tablet  lisinopril (PRINIVIL/ZESTRIL) 20 MG tablet  Multiple Vitamin (MULTI-VITAMINS) TABS          Review of Systems   Constitutional: Negative for activity change, appetite change, fatigue and fever.   HENT: Negative for drooling, facial swelling,  "rhinorrhea, sore throat and trouble swallowing.    Eyes: Negative for pain and visual disturbance.   Respiratory: Negative for cough, chest tightness, shortness of breath, wheezing and stridor.    Cardiovascular: Negative for chest pain and leg swelling.   Gastrointestinal: Negative for abdominal pain, constipation, diarrhea, nausea and vomiting.   Genitourinary: Negative for dysuria, frequency and urgency.   Musculoskeletal: Negative for back pain, neck pain and neck stiffness.   Skin: Negative for color change.   Neurological: Positive for headaches. Negative for dizziness, tremors, seizures, facial asymmetry, speech difficulty, weakness and light-headedness.   Psychiatric/Behavioral: Positive for confusion.       Physical Exam   BP: 133/46  Pulse: 94  Temp: 96.5  F (35.8  C)  Resp: 16  Height: 180.3 cm (5' 11\")  Weight: 81.6 kg (180 lb)  SpO2: 99 %      Physical Exam  Constitutional:       General: He is not in acute distress.     Appearance: He is not ill-appearing, toxic-appearing or diaphoretic.   HENT:      Head: Atraumatic.      Right Ear: Tympanic membrane normal. No drainage or tenderness.      Left Ear: Tympanic membrane normal. No drainage or tenderness.      Nose: Nose normal. No rhinorrhea.   Eyes:      General: No scleral icterus.     Extraocular Movements: Extraocular movements intact.      Right eye: Normal extraocular motion and no nystagmus.      Left eye: Normal extraocular motion and no nystagmus.      Pupils: Pupils are equal, round, and reactive to light. Pupils are equal.      Funduscopic exam:     Right eye: No AV nicking or papilledema. Red reflex present.         Left eye: No AV nicking or papilledema. Red reflex present.  Neck:      Musculoskeletal: Normal range of motion. No neck rigidity, pain with movement or muscular tenderness.      Vascular: No JVD.      Trachea: No tracheal deviation.   Cardiovascular:      Rate and Rhythm: Normal rate and regular rhythm.      Heart sounds: Normal " heart sounds. No friction rub.   Pulmonary:      Effort: No respiratory distress.      Breath sounds: Normal breath sounds. No stridor.   Abdominal:      General: Bowel sounds are normal.      Palpations: Abdomen is soft.      Tenderness: There is no abdominal tenderness. There is no guarding or rebound.   Musculoskeletal: Normal range of motion.         General: No tenderness.   Lymphadenopathy:      Cervical: No cervical adenopathy.      Right cervical: No superficial cervical adenopathy.     Left cervical: No superficial cervical adenopathy.   Skin:     General: Skin is warm.      Capillary Refill: Capillary refill takes less than 2 seconds.      Findings: No rash.   Neurological:      General: No focal deficit present.      Mental Status: He is alert and oriented to person, place, and time.         ED Course     Results for orders placed or performed during the hospital encounter of 01/31/20 (from the past 24 hour(s))   CT Cervical Spine w/o Contrast    Narrative    Exam:CT CERVICAL SPINE W/O CONTRAST    History:64 years Male Polytrauma, critical, head/C-spine injury  suspected; head injury fall from roof    Comparisons:None    Technique: Axial CT imaging of the cervical spine was performed.  Coronal and sagittal reconstructions were obtained.    Findings:Alignment of the cervical spine is normal. There is no  evidence of subluxation or fracture.  There is multilevel degenerative  disc disease and facet osteoarthritis.      Impression    IMPRESSION: No evidence of traumatic subluxation or fracture of the  cervical spine.    ANNAMARIA BELTRAN MD   CT Head w/o Contrast    Narrative    Exam: CT HEAD W/O CONTRAST    Clinical history:64 years Male Head trauma, subacute, neuro/cognitive  deficit; head injury fall from roof    Comparisons:None    Technique: Axial CT imaging of the head was performed Without  intervenous contrast.    FINDINGS:   Ventricles and sulci are symmetric. The gray-white matter  differentiation  "throughout the brain is well maintained. There is no  evidence of intracranial mass or hemorrhage. Visualized portions of  the paranasal sinuses and mastoid air cells are well aerated. There is  no evidence of skull fracture.      Impression    IMPRESSION: Negative Head CT    ANNAMARIA BELTRAN MD       Medications - No data to display    Assessments & Plan (with Medical Decision Making)     I have reviewed the nursing notes.    I have reviewed the findings, diagnosis, plan and need for follow up with the patient.      New Prescriptions    No medications on file       Final diagnoses:   Fall in home, initial encounter   Closed head injury, initial encounter   Post concussion syndrome     Afebrile.  Vital signs stable.  Patient with a fall 2 weeks ago with some initial \"fogginess\" as well as a headache today with some confusion as well.  Concussion with postconcussive syndrome.  CT of his head and cervical area show no acute findings.  Reassurance was given.  I long discussion about postconcussive syndrome.  Continue to monitor his symptoms and return if there is any concerns for further evaluation as needed.  1/31/2020   [facility]     Christopher Giraldo PA-C  01/31/20 1147    "

## 2020-01-31 NOTE — TELEPHONE ENCOUNTER
head injury.    S-(situation): Headaches    B-(background): Fell 2 weeks ago. Hit his head    A-(assessment): Patient and wife on the phone. At time of call Bp 125/66. Headache is off and on. Feels like pressure (forehead area) and he hit the back of his head when he fell. No visual changes that he has noticed. No balance issues. No blood thinners but on blood pressure medications. Later patient reported he does take aspirin as needed, but hasn't the past few days.    R-(recommendations): Did education on head injuries. Discussed going to the ED. The patient and wife stated that he only wanted to see Dr. Burns. I mentioned I would go speak with him to see if he would see him. But did mention that he would probably recommended the ED as well. Spoke with Dr. Burns after getting off the phone. He also recommended the ED. Relayed message to the patient's wife, and she stated he might not go. Patient had left the home for a bit but would talk with him when he returned. Leigh Ann Hallman RN  ....................  1/31/2020   10:05 AM        Additional Information    Negative: Taking Coumadin (warfarin) or other strong blood thinner, or known bleeding disorder (e.g., thrombocytopenia)    After 3 days and headache persists    Patient wants to be seen    Protocols used: HEAD INJURY-A-[place]

## 2020-08-03 ENCOUNTER — VIRTUAL VISIT (OUTPATIENT)
Dept: FAMILY MEDICINE | Facility: OTHER | Age: 65
End: 2020-08-03
Attending: PHYSICIAN ASSISTANT
Payer: MEDICARE

## 2020-08-03 VITALS — BODY MASS INDEX: 23.7 KG/M2 | HEIGHT: 72 IN | WEIGHT: 175 LBS | TEMPERATURE: 97 F

## 2020-08-03 DIAGNOSIS — Z20.822 COVID-19 RULED OUT: Primary | ICD-10-CM

## 2020-08-03 ASSESSMENT — PAIN SCALES - GENERAL: PAINLEVEL: NO PAIN (0)

## 2020-08-03 ASSESSMENT — MIFFLIN-ST. JEOR: SCORE: 1603.85

## 2020-08-03 NOTE — PROGRESS NOTES
Pt. Refused to have phone visit because insurance doesn't cover it.   Elisabeth Grossman LPN.......  8/3/2020  10:57 AM

## 2020-08-25 ENCOUNTER — OFFICE VISIT (OUTPATIENT)
Dept: INTERNAL MEDICINE | Facility: OTHER | Age: 65
End: 2020-08-25
Attending: INTERNAL MEDICINE
Payer: MEDICARE

## 2020-08-25 VITALS
TEMPERATURE: 97.8 F | BODY MASS INDEX: 24.22 KG/M2 | SYSTOLIC BLOOD PRESSURE: 130 MMHG | DIASTOLIC BLOOD PRESSURE: 82 MMHG | RESPIRATION RATE: 18 BRPM | HEART RATE: 56 BPM | HEIGHT: 71 IN | WEIGHT: 173 LBS

## 2020-08-25 DIAGNOSIS — I10 BENIGN ESSENTIAL HYPERTENSION: Primary | ICD-10-CM

## 2020-08-25 DIAGNOSIS — Z86.0101 H/O ADENOMATOUS POLYP OF COLON: ICD-10-CM

## 2020-08-25 DIAGNOSIS — K21.00 GASTROESOPHAGEAL REFLUX DISEASE WITH ESOPHAGITIS: ICD-10-CM

## 2020-08-25 DIAGNOSIS — Z12.5 SCREENING FOR PROSTATE CANCER: ICD-10-CM

## 2020-08-25 DIAGNOSIS — Z80.0 FAMILY HISTORY OF COLON CANCER: ICD-10-CM

## 2020-08-25 DIAGNOSIS — R39.9 LOWER URINARY TRACT SYMPTOMS (LUTS): ICD-10-CM

## 2020-08-25 PROBLEM — K29.70 HELICOBACTER PYLORI GASTRITIS: Status: RESOLVED | Noted: 2019-05-01 | Resolved: 2020-08-25

## 2020-08-25 PROBLEM — R13.10 DYSPHAGIA: Status: RESOLVED | Noted: 2019-04-24 | Resolved: 2020-08-25

## 2020-08-25 PROBLEM — B96.81 HELICOBACTER PYLORI GASTRITIS: Status: RESOLVED | Noted: 2019-05-01 | Resolved: 2020-08-25

## 2020-08-25 LAB
ALBUMIN SERPL-MCNC: 4.6 G/DL (ref 3.5–5.7)
ALP SERPL-CCNC: 40 U/L (ref 34–104)
ALT SERPL W P-5'-P-CCNC: 27 U/L (ref 7–52)
ANION GAP SERPL CALCULATED.3IONS-SCNC: 7 MMOL/L (ref 3–14)
AST SERPL W P-5'-P-CCNC: 35 U/L (ref 13–39)
BILIRUB SERPL-MCNC: 1 MG/DL (ref 0.3–1)
BUN SERPL-MCNC: 21 MG/DL (ref 7–25)
CALCIUM SERPL-MCNC: 10.1 MG/DL (ref 8.6–10.3)
CHLORIDE SERPL-SCNC: 98 MMOL/L (ref 98–107)
CHOLEST SERPL-MCNC: 138 MG/DL
CO2 SERPL-SCNC: 33 MMOL/L (ref 21–31)
CREAT SERPL-MCNC: 0.82 MG/DL (ref 0.7–1.3)
GFR SERPL CREATININE-BSD FRML MDRD: >90 ML/MIN/{1.73_M2}
GLUCOSE SERPL-MCNC: 104 MG/DL (ref 70–105)
HDLC SERPL-MCNC: 52 MG/DL (ref 23–92)
LDLC SERPL CALC-MCNC: 75 MG/DL
NONHDLC SERPL-MCNC: 86 MG/DL
POTASSIUM SERPL-SCNC: 3.9 MMOL/L (ref 3.5–5.1)
PROT SERPL-MCNC: 7.6 G/DL (ref 6.4–8.9)
PSA SERPL-ACNC: 0.83 NG/ML
SODIUM SERPL-SCNC: 138 MMOL/L (ref 134–144)
TRIGL SERPL-MCNC: 56 MG/DL

## 2020-08-25 PROCEDURE — 90732 PPSV23 VACC 2 YRS+ SUBQ/IM: CPT

## 2020-08-25 PROCEDURE — 36415 COLL VENOUS BLD VENIPUNCTURE: CPT | Mod: ZL | Performed by: INTERNAL MEDICINE

## 2020-08-25 PROCEDURE — 99215 OFFICE O/P EST HI 40 MIN: CPT | Performed by: INTERNAL MEDICINE

## 2020-08-25 PROCEDURE — G0463 HOSPITAL OUTPT CLINIC VISIT: HCPCS

## 2020-08-25 PROCEDURE — G0103 PSA SCREENING: HCPCS | Mod: ZL | Performed by: INTERNAL MEDICINE

## 2020-08-25 PROCEDURE — 80061 LIPID PANEL: CPT | Mod: ZL | Performed by: INTERNAL MEDICINE

## 2020-08-25 PROCEDURE — 80053 COMPREHEN METABOLIC PANEL: CPT | Mod: ZL | Performed by: INTERNAL MEDICINE

## 2020-08-25 PROCEDURE — G0009 ADMIN PNEUMOCOCCAL VACCINE: HCPCS

## 2020-08-25 PROCEDURE — G0463 HOSPITAL OUTPT CLINIC VISIT: HCPCS | Mod: 25

## 2020-08-25 RX ORDER — LOSARTAN POTASSIUM AND HYDROCHLOROTHIAZIDE 12.5; 5 MG/1; MG/1
1 TABLET ORAL DAILY
Qty: 90 TABLET | Refills: 3 | Status: SHIPPED | OUTPATIENT
Start: 2020-08-25 | End: 2020-09-14

## 2020-08-25 ASSESSMENT — ENCOUNTER SYMPTOMS
BACK PAIN: 0
HALLUCINATIONS: 0
SEIZURES: 0
SLEEP DISTURBANCE: 0
JOINT SWELLING: 0
CHILLS: 0
UNEXPECTED WEIGHT CHANGE: 0
CONFUSION: 0
NAUSEA: 0
AGITATION: 0
NERVOUS/ANXIOUS: 0
EYE PAIN: 0
NECK STIFFNESS: 0
BRUISES/BLEEDS EASILY: 0
RHINORRHEA: 0
DIFFICULTY URINATING: 0
NUMBNESS: 0
SINUS PAIN: 0
VOMITING: 0
WEAKNESS: 0
TREMORS: 0
CHEST TIGHTNESS: 0
APPETITE CHANGE: 0
DIAPHORESIS: 0
FLANK PAIN: 0
SORE THROAT: 0
DYSURIA: 0
PALPITATIONS: 0
FATIGUE: 0
NECK PAIN: 0
EYE REDNESS: 0
ADENOPATHY: 0
HEADACHES: 0
CONSTIPATION: 0
TROUBLE SWALLOWING: 0
ABDOMINAL DISTENTION: 0
VOICE CHANGE: 0
SHORTNESS OF BREATH: 0
LIGHT-HEADEDNESS: 0
DIZZINESS: 0
ABDOMINAL PAIN: 0
FEVER: 0
SPEECH DIFFICULTY: 0
HEMATURIA: 0
FREQUENCY: 0
BLOOD IN STOOL: 0
DIARRHEA: 0
COUGH: 0
WHEEZING: 0
WOUND: 0
SINUS PRESSURE: 0

## 2020-08-25 ASSESSMENT — PAIN SCALES - GENERAL: PAINLEVEL: NO PAIN (0)

## 2020-08-25 ASSESSMENT — MIFFLIN-ST. JEOR: SCORE: 1578.91

## 2020-08-25 NOTE — NURSING NOTE
"Chief Complaint   Patient presents with     Physical       Initial /82 (BP Location: Right arm, Patient Position: Sitting, Cuff Size: Adult Regular)   Pulse 56   Temp 97.8  F (36.6  C) (Tympanic)   Resp 18   Ht 1.791 m (5' 10.5\")   Wt 78.5 kg (173 lb)   BMI 24.47 kg/m   Estimated body mass index is 24.47 kg/m  as calculated from the following:    Height as of this encounter: 1.791 m (5' 10.5\").    Weight as of this encounter: 78.5 kg (173 lb).  Medication Reconciliation: complete    Crystal Presley LPN  "

## 2020-08-25 NOTE — PROGRESS NOTES
Chief Complaint:  This patient is here for a comprehensive review of their multiple medical problems, renewal of medications and update on necessary health maintenance issues.      HPI: He is here today for complete evaluation and a review of his chronic medical problems.  In most respects he feels well.  He has a history of hypertension.  He is on 2 drug therapy for control of his blood pressure.  He does not have any endorgan disease as a result of his hypertension.  He also has a history of a gastric ulcer last year.  This proved to be H. pylori associated and he is status post treatment with no further symptomatology.  He also has a history of adenomatous colonic polyps.  Next colonoscopy will be due in 2023.    The last time he was in he had troubles with arthritis in his knee.  That is actually not bothering him much anymore.  He is having trouble with a cough and some phlegm.  He wonders if it could possibly be related to his blood pressure medication.  He does describe some probable aspiration of liquids when he is riding his bike where he will take a drink of water and then cough for up to 10 minutes afterwards.    No other complaints or concerns.  Medications are reconciled.  Past medical history, past surgical history, family history and social histories are reviewed and updated.  He is due for a Pneumovax today and I talked to him about the influenza vaccine and Shingrix vaccines.    Past Medical History:   Diagnosis Date     Acute gastric ulcer due to Helicobacter pylori      Essential (primary) hypertension     No Comments Provided     Lower urinary tract symptoms (LUTS)      Tubular adenoma        Past Surgical History:   Procedure Laterality Date     COLONOSCOPY      10/5/09     COLONOSCOPY N/A 7/16/2018    F/U  2023 tubular adenomas     ENDOSCOPY  04/29/2019    EGD antral ulcer     ESOPHAGOSCOPY, GASTROSCOPY, DUODENOSCOPY (EGD), COMBINED N/A 4/29/2019    Procedure: ESOPHAGOGASTRODUODENOSCOPY, WITH  BIOPSY;  Surgeon: Jorge Mcclain MD;  Location: [facility] OR     HERNIA REPAIR, UMBILICAL       LAPAROSCOPIC CHOLECYSTECTOMY  2017     RELEASE CARPAL TUNNEL Right        Current Outpatient Medications   Medication Sig Dispense Refill     aspirin (ASA) 81 MG tablet Take 1 tablet (81 mg) by mouth daily with food       hydrochlorothiazide (HYDRODIURIL) 12.5 MG tablet Take 1 tablet (12.5 mg) by mouth daily 90 tablet 3     lisinopril (PRINIVIL/ZESTRIL) 20 MG tablet Take 1 tablet (20 mg) by mouth daily 90 tablet 3     Multiple Vitamin (MULTI-VITAMINS) TABS Take 1 tablet by mouth daily         No Known Allergies    Family History   Problem Relation Age of Onset     Colon Cancer Mother          age 60 or 61     Diabetes Father         Diabetes, post renal transplant secondary to diabetic complications,     Hypertension Father      Heart Disease Father          of myocardial infarction at age 57     Heart Disease Brother         With bicuspid aortic valve, status post aortic valve replacement     Breast Cancer Sister        Social History     Socioeconomic History     Marital status:      Spouse name: Not on file     Number of children: Not on file     Years of education: Not on file     Highest education level: Not on file   Occupational History     Not on file   Social Needs     Financial resource strain: Not on file     Food insecurity     Worry: Not on file     Inability: Not on file     Transportation needs     Medical: Not on file     Non-medical: Not on file   Tobacco Use     Smoking status: Former Smoker     Last attempt to quit: 10/25/1975     Years since quittin.8     Smokeless tobacco: Former User     Quit date: 10/25/1998   Substance and Sexual Activity     Alcohol use: Yes     Alcohol/week: 0.8 standard drinks     Frequency: 2-4 times a month     Drinks per session: 1 or 2     Comment: 1 weekly      Drug use: No     Comment: Drug use: No     Sexual activity: Yes     Partners: Female    Lifestyle     Physical activity     Days per week: Not on file     Minutes per session: Not on file     Stress: Not on file   Relationships     Social connections     Talks on phone: Not on file     Gets together: Not on file     Attends Ten[employer]ism service: Not on file     Active member of club or organization: Not on file     Attends meetings of clubs or organizations: Not on file     Relationship status: Not on file     Intimate partner violence     Fear of current or ex partner: Not on file     Emotionally abused: Not on file     Physically abused: Not on file     Forced sexual activity: Not on file   Other Topics Concern     Parent/sibling w/ CABG, MI or angioplasty before 65F 55M? Not Asked   Social History Narrative    Vegetarian.  , retired from  the mines.  Rides bike daily.  Lives in [place].       Review of Systems   Constitutional: Negative for appetite change, chills, diaphoresis, fatigue, fever and unexpected weight change.   HENT: Negative for ear pain, rhinorrhea, sinus pressure, sinus pain, sore throat, trouble swallowing and voice change.    Eyes: Negative for pain, redness and visual disturbance.   Respiratory: Negative for cough, chest tightness, shortness of breath and wheezing.    Cardiovascular: Negative for chest pain, palpitations and leg swelling.   Gastrointestinal: Negative for abdominal distention, abdominal pain, blood in stool, constipation, diarrhea, nausea and vomiting.   Endocrine: Negative for cold intolerance and heat intolerance.   Genitourinary: Negative for difficulty urinating, dysuria, flank pain, frequency and hematuria.   Musculoskeletal: Negative for back pain, joint swelling, neck pain and neck stiffness.   Skin: Negative for rash and wound.   Allergic/Immunologic: Negative for immunocompromised state.   Neurological: Negative for dizziness, tremors, seizures, syncope, speech difficulty, weakness, light-headedness, numbness and headaches.   Hematological:  Negative for adenopathy. Does not bruise/bleed easily.   Psychiatric/Behavioral: Negative for agitation, behavioral problems, confusion, hallucinations and sleep disturbance. The patient is not nervous/anxious.        Physical Exam  Vitals signs and nursing note reviewed.   Constitutional:       General: He is not in acute distress.     Appearance: He is well-developed. He is not diaphoretic.   HENT:      Head: Normocephalic.      Right Ear: Tympanic membrane, ear canal and external ear normal.      Left Ear: Tympanic membrane, ear canal and external ear normal.      Nose: Nose normal.      Mouth/Throat:      Pharynx: No oropharyngeal exudate.   Eyes:      Conjunctiva/sclera: Conjunctivae normal.      Pupils: Pupils are equal, round, and reactive to light.   Neck:      Musculoskeletal: Normal range of motion and neck supple.      Thyroid: No thyroid mass or thyromegaly.      Vascular: Normal carotid pulses. No carotid bruit or JVD.      Trachea: No tracheal deviation.   Cardiovascular:      Rate and Rhythm: Normal rate and regular rhythm.      Heart sounds: Murmur present. Systolic murmur present with a grade of 2/6. No friction rub. No gallop.    Pulmonary:      Effort: Pulmonary effort is normal. No respiratory distress.      Breath sounds: Normal breath sounds. No stridor. No decreased breath sounds, wheezing, rhonchi or rales.   Abdominal:      General: Bowel sounds are normal. There is no distension.      Palpations: Abdomen is soft. There is no mass.      Tenderness: There is no abdominal tenderness. There is no guarding or rebound.      Hernia: No hernia is present.   Genitourinary:     Penis: Normal.       Scrotum/Testes: Normal.      Prostate: Normal.      Rectum: Normal.   Musculoskeletal: Normal range of motion.      Right lower leg: No edema.      Left lower leg: No edema.   Lymphadenopathy:      Cervical: No cervical adenopathy.   Skin:     General: Skin is warm and dry.      Findings: No rash.    Neurological:      Mental Status: He is alert and oriented to person, place, and time.      Cranial Nerves: No cranial nerve deficit.      Sensory: No sensory deficit.      Motor: No abnormal muscle tone.      Coordination: Coordination normal.      Deep Tendon Reflexes: Reflexes normal.         Assessment:      ICD-10-CM    1. Benign essential hypertension  I10    2. Lower urinary tract symptoms (LUTS)  R39.9    3. Family history of colon cancer mom  62  Z80.0    4. H/O adenomatous polyp of colon  Z86.010    5. Gastroesophageal reflux disease with esophagitis  K21.0         Plan: Overall he appears to be doing very well.  I am going to change him to an ARB from his ACE inhibitor to see if that helps with his cough.  He is put on Hyzaar 1 daily.  If his cough and congestion persist he can always return for reevaluation and possible chest x-ray.  Pneumovax given today.  He will look into getting the Shingrix vaccine.  Complete lab drawn and pending and I will send him a letter with the results.  If all goes well he will follow-up on an annual basis.

## 2020-08-25 NOTE — LETTER
August 25, 2020      Anjel Payton  [address] [id number]        Dear Anjel,    Below are the results of your recent labs:    Results for orders placed or performed in visit on 08/25/20   PSA Screen [facility]     Status: None   Result Value Ref Range    PSA Screen 0.835 <3.100 ng/mL   Lipid Profile     Status: None   Result Value Ref Range    Cholesterol 138 <200 mg/dL    Triglycerides 56 <150 mg/dL    HDL Cholesterol 52 23 - 92 mg/dL    LDL Cholesterol Calculated 75 <100 mg/dL    Non HDL Cholesterol 86 <130 mg/dL   Comprehensive metabolic panel     Status: Abnormal   Result Value Ref Range    Sodium 138 134 - 144 mmol/L    Potassium 3.9 3.5 - 5.1 mmol/L    Chloride 98 98 - 107 mmol/L    Carbon Dioxide 33 (H) 21 - 31 mmol/L    Anion Gap 7 3 - 14 mmol/L    Glucose 104 70 - 105 mg/dL    Urea Nitrogen 21 7 - 25 mg/dL    Creatinine 0.82 0.70 - 1.30 mg/dL    GFR Estimate >90 >60 mL/min/[1.73_m2]    GFR Estimate If Black >90 >60 mL/min/[1.73_m2]    Calcium 10.1 8.6 - 10.3 mg/dL    Bilirubin Total 1.0 0.3 - 1.0 mg/dL    Albumin 4.6 3.5 - 5.7 g/dL    Protein Total 7.6 6.4 - 8.9 g/dL    Alkaline Phosphatase 40 34 - 104 U/L    ALT 27 7 - 52 U/L    AST 35 13 - 39 U/L        Your blood tests are normal.  Congratulations on this excellent report.    Sincerely,        Sanjeev Burns MD  Internal Medicine  [facility]

## 2020-09-14 ENCOUNTER — TELEPHONE (OUTPATIENT)
Dept: INTERNAL MEDICINE | Facility: OTHER | Age: 65
End: 2020-09-14

## 2020-09-14 DIAGNOSIS — I10 BENIGN ESSENTIAL HYPERTENSION: Primary | ICD-10-CM

## 2020-09-14 RX ORDER — HYDROCHLOROTHIAZIDE 12.5 MG/1
12.5 TABLET ORAL DAILY
Qty: 90 TABLET | Refills: 3 | Status: SHIPPED | OUTPATIENT
Start: 2020-09-14 | End: 2021-04-13

## 2020-09-14 RX ORDER — LISINOPRIL 20 MG/1
20 TABLET ORAL DAILY
Qty: 90 TABLET | Refills: 3 | Status: SHIPPED | OUTPATIENT
Start: 2020-09-14 | End: 2021-10-03

## 2020-09-14 NOTE — TELEPHONE ENCOUNTER
Called patient and verified name and date of birth. Informed him of note from Dr. Burns and medications being sent to his pharmacy.   No further questions.  Crystal Presley LPN on 9/14/2020 at 8:31 AM

## 2020-09-14 NOTE — TELEPHONE ENCOUNTER
Patient was switched to Losartan-hydrochlorothiazide and has since had heart palpitations and dizziness. He has been on this medication for 3 weeks. His last BP was 136/81 yesterday morning. He is wanting to change medications.    Sending to triage RN's for further evaluation.    Crystal Presley LPN on 9/14/2020 at 8:15 AM

## 2020-09-14 NOTE — TELEPHONE ENCOUNTER
Discontinue the Hyzaar and resume lisinopril and hydrochlorothiazide.  These are sent to his pharmacy.

## 2020-09-14 NOTE — TELEPHONE ENCOUNTER
Patient would like to discuss changing his BP medication.  He states that the prescription for Losartan is not working.

## 2020-09-14 NOTE — TELEPHONE ENCOUNTER
"Pt called with c/o lightheadedness and intermittent \"pulsating in my head and chest\" for the last 3 weeks. Pt reported his cough has not improved after switching from Lisinopril to Losartan-hydrochlorothiazide. Pt would like to switch back to Lisinopril. Denies CP, SOB, N/V, fever. Will route to PCP for consideration.    Jodee Kwok RN  ....................  9/14/2020   8:23 AM      "

## 2020-09-15 ENCOUNTER — NURSE TRIAGE (OUTPATIENT)
Dept: INTERNAL MEDICINE | Facility: OTHER | Age: 65
End: 2020-09-15

## 2020-09-15 ENCOUNTER — HOSPITAL ENCOUNTER (EMERGENCY)
Facility: OTHER | Age: 65
Discharge: HOME | End: 2020-09-15
Attending: FAMILY MEDICINE | Admitting: FAMILY MEDICINE
Payer: MEDICARE

## 2020-09-15 VITALS
SYSTOLIC BLOOD PRESSURE: 139 MMHG | RESPIRATION RATE: 16 BRPM | BODY MASS INDEX: 24.5 KG/M2 | TEMPERATURE: 97.9 F | WEIGHT: 175 LBS | OXYGEN SATURATION: 98 % | DIASTOLIC BLOOD PRESSURE: 87 MMHG | HEART RATE: 60 BPM | HEIGHT: 71 IN
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
## 2020-09-15 PROCEDURE — 25000128 H RX IP 250 OP 636: Performed by: FAMILY MEDICINE

## 2020-09-15 PROCEDURE — 90715 TDAP VACCINE 7 YRS/> IM: CPT | Performed by: FAMILY MEDICINE

## 2020-09-15 PROCEDURE — 99282 EMERGENCY DEPT VISIT SF MDM: CPT | Mod: Z6 | Performed by: FAMILY MEDICINE

## 2020-09-15 PROCEDURE — 90471 IMMUNIZATION ADMIN: CPT | Performed by: FAMILY MEDICINE

## 2020-09-15 PROCEDURE — 99283 EMERGENCY DEPT VISIT LOW MDM: CPT | Performed by: FAMILY MEDICINE

## 2020-09-15 RX ADMIN — TETANUS TOXOID, REDUCED DIPHTHERIA TOXOID AND ACELLULAR PERTUSSIS VACCINE, ADSORBED 0.5 ML: 5; 2.5; 8; 8; 2.5 SUSPENSION INTRAMUSCULAR at 09:28

## 2020-09-15 ASSESSMENT — MIFFLIN-ST. JEOR: SCORE: 1595.92

## 2020-09-15 NOTE — ED PROVIDER NOTES
History     Chief Complaint   Patient presents with     Dog Bite     HPI  Anjel Payton is a 65 year old male who presents the emergency department approximately 24 hours after a dog bite.  He was riding his bike yesterday when I dog ran out of the yard and nipped him on his right hand.  It did bleed but it does not appear to be full-thickness, bleeding is stopped now.  No pain swelling or drainage.  He cleaned it up really well at home.  He did talk to his primary care doctor who recommended to get a tetanus shot and follow our guidance on any consideration for rabies.  Unclear whether the dog is vaccinated or not.  The dog has bitten people before.  It is a drastic dog and  did respond to the scene and the dog will be home quarantined.    Reviewed nurses notes below, similar history is related to me.  Yesterday at about noon, patient was riding his bike when a dog came and bit his right hand puncturing the skin on his ring finger and index finger.  There was bleeding to the area at that time but none now.  Minimal pain, no swelling or drainage.  He cleaned it once he got home but Dr. Burns told him to come check it out since the dog had had no shots.  MD suggested a tetanus shot.  Allergies:  No Known Allergies    Problem List:    Patient Active Problem List    Diagnosis Date Noted     Acute gastric ulcer due to Helicobacter pylori      Priority: Medium     Gastroesophageal reflux disease with esophagitis 2019     Priority: Medium     Acute ulcer of  antrum 2019     Priority: Medium     Lower urinary tract symptoms (LUTS) 2018     Priority: Medium     H/O adenomatous polyp of colon 2018     Priority: Medium     Family history of colon cancer mom  62 2018     Priority: Medium     Benign essential hypertension 2018     Priority: Medium     Lumbago 2018     Priority: Medium     Overview:   with L5-S1 disc protrusion, moderate and asymmetric to the right with  right S1   nerve rootlet displaced posteriorly       Vegetarian 2014     Priority: Medium        Past Medical History:    Past Medical History:   Diagnosis Date     Acute gastric ulcer due to Helicobacter pylori      Essential (primary) hypertension      Lower urinary tract symptoms (LUTS)      Tubular adenoma        Past Surgical History:    Past Surgical History:   Procedure Laterality Date     COLONOSCOPY      10/5/09     COLONOSCOPY N/A 2018    F/U   tubular adenomas     ENDOSCOPY  2019    EGD antral ulcer     ESOPHAGOSCOPY, GASTROSCOPY, DUODENOSCOPY (EGD), COMBINED N/A 2019    Procedure: ESOPHAGOGASTRODUODENOSCOPY, WITH BIOPSY;  Surgeon: Jorge Mcclain MD;  Location: [facility] OR     HERNIA REPAIR, UMBILICAL       LAPAROSCOPIC CHOLECYSTECTOMY  2017     RELEASE CARPAL TUNNEL Right        Family History:    Family History   Problem Relation Age of Onset     Colon Cancer Mother          age 60 or 61     Diabetes Father         Diabetes, post renal transplant secondary to diabetic complications,     Hypertension Father      Heart Disease Father          of myocardial infarction at age 57     Heart Disease Brother         With bicuspid aortic valve, status post aortic valve replacement     Breast Cancer Sister        Social History:  Marital Status:   [2]  Social History     Tobacco Use     Smoking status: Former Smoker     Last attempt to quit: 10/25/1975     Years since quittin.9     Smokeless tobacco: Former User     Quit date: 10/25/1998   Substance Use Topics     Alcohol use: Yes     Alcohol/week: 0.8 standard drinks     Frequency: 2-4 times a month     Drinks per session: 1 or 2     Comment: 1 weekly      Drug use: No     Comment: Drug use: No        Medications:    aspirin (ASA) 81 MG tablet  hydrochlorothiazide (HYDRODIURIL) 12.5 MG tablet  lisinopril (ZESTRIL) 20 MG tablet  Multiple Vitamin (MULTI-VITAMINS) TABS          Review of Systems   Constitutional:  "Negative for fever.   HENT: Negative for congestion.    Eyes: Negative for redness.   Respiratory: Negative for shortness of breath.    Cardiovascular: Negative for chest pain.   Gastrointestinal: Negative for abdominal pain.   Genitourinary: Negative for difficulty urinating.   Musculoskeletal: Negative for arthralgias and neck stiffness.   Skin: Negative for color change.   Neurological: Negative for headaches.   Psychiatric/Behavioral: Negative for confusion.       Physical Exam   BP: (!) 156/84  Pulse: 57  Temp: 97.9  F (36.6  C)  Resp: 16  Height: 180.3 cm (5' 11\")  Weight: 79.4 kg (175 lb)  SpO2: 99 %      Physical Exam  Vitals signs and nursing note reviewed.   Neck:      Musculoskeletal: Normal range of motion.   Cardiovascular:      Rate and Rhythm: Normal rate.     Superficial abrasions on the dorsum of the right hand and dorsal lateral aspect of the right wrist.  No full-thickness punctures are appreciated.  No active bleeding, no erythema, induration purulent drainage or tenderness.  No crepitus.  Full range of motion of his hand.    ED Course        Procedures               No results found for this or any previous visit (from the past 24 hour(s)).    Medications   Tdap (tetanus-diptheria-acell pertussis) (BOOSTRIX) injection 0.5 mL (has no administration in time range)       Assessments & Plan (with Medical Decision Making)     I have reviewed the nursing notes.    I have reviewed the findings, diagnosis, plan and need for follow up with the patient.    New Prescriptions    No medications on file       Final diagnoses:   Dog bite, initial encounter     This was a provoked bite by domestic dog that is now in home quarantine.  Rabies vaccination not indicated at this time.  Tetanus updated.  Watch for signs or symptoms of infection.  Patient verbalized understanding plan is agreement he left the ED in improved condition.  9/15/2020   [facility] \[facility]\""     Miguel Keenan MD  09/16/20 " [id number]

## 2020-09-15 NOTE — TELEPHONE ENCOUNTER
"Contacted wife and informed of physician's response. Wife verbalized understanding and intent to comply. \" Ok I will call him and we will head into the ER\". Darlene Allen RN on 9/15/2020 at 8:15 AM    "

## 2020-09-15 NOTE — ED AVS SNAPSHOT
[facility]  [address]  [place] [address]  Phone:  [phone]  Fax:  [phone]                                    Anjel Payton   MRN: [id number]    Department:  [facility]   Date of Visit:  9/15/2020           After Visit Summary Signature Page    I have received my discharge instructions, and my questions have been answered. I have discussed any challenges I see with this plan with the nurse or doctor.    ..........................................................................................................................................  Patient/Patient Representative Signature      ..........................................................................................................................................  Patient Representative Print Name and Relationship to Patient    ..................................................               ................................................  Date                                   Time    ..........................................................................................................................................  Reviewed by Signature/Title    ...................................................              ..............................................  Date                                               Time          22EPIC Rev 08/18

## 2020-09-15 NOTE — ED TRIAGE NOTES
Yesterday at about noon, patient was riding his bike when a dog came and bit his right hand puncturing the skin on his ring finger and index finger.  There was bleeding to the area at that time but none now.  Minimal pain, no swelling or drainage.  He cleaned it once he got home but Dr. Burns told him to come check it out since the dog had had no shots.  MD suggested a tetanus shot.

## 2020-09-15 NOTE — TELEPHONE ENCOUNTER
"S-(situation): Per call center-States pt was bitten by a dog yesterday. They said the dog hasn't had any shots and they are watching the dog in quarantine. Would like to know if there is something he should do or watch for.     B-(background): Please note wife answer triage questions. Happened    yesterday early afternoon. He thought it was a black lab. Riding his bike and he reach down to spray him with water and the dog clamped down on his right hand. The bite went through his gloves and it broke the skin and it did bleed. The dog owner said the dog had bit someone else before but the deputy couldn't find it. The deputy said to just clean it well\".     A-(assessment): 3 puncture wounds on his right hand. Maybe an inch each it wouldn't require stitches. Put hand hand sanitizers on it and then he showered when he got home and put alcohol on it. Doesn't looks infected. Maybe slight swelling     R-(recommendations): Protocol recommends patient go to the ED for wound irrigation and may need rabies vaccine. Wife is requesting PCP to review and advise. \"Would like to know if there is something he should do or watch for\".     Wife requests physician consideration and a callback today please    Darlene Allen RN on 9/15/2020 at 8:01 AM    Reason for Disposition    Any break in skin from BITE (e.g., cut, puncture, or scratch) and PET animal (e.g., dog, cat, or ferret) at risk for RABIES (e.g., sick, stray, unprovoked bite, developing country)    Additional Information    Negative: Major bleeding (actively dripping or spurting) that can't be stopped    Negative: Sounds like a life-threatening emergency to the triager    Negative: Any break in skin from BITE (e.g., cut, puncture, or scratch) and WILD animal at RISK FOR RABIES (e.g., bat, raccoon, yost, skunk, coyote, other carnivores)    Answer Assessment - Initial Assessment Questions  1. ANIMAL: \"What type of animal caused the bite?\" \"Is the injury from a bite or a claw?\" If " "the animal is a dog or a cat, ask: \"Was it a pet or a stray?\" \"Was it acting ill or behaving strangely?\"      He thought it was a black lab  2. LOCATION: \"Where is the bite located?\"       3 puncture wounds on his right hand. About an inch each The dog owner said the dog had bit someone else before but the deputy couldn't find it.   3. SIZE: \"How big is the bite?\" \"What does it look like?\"       Maybe an inch it wouldn't require stitches. Put hand hand sanitizers and then he showered when he got home and put alcohol on it. Doesn't looks infected. Maybe slight swelling  4. ONSET: \"When did the bite happen?\" (Minutes or hours ago)       Yesterday early afternoon. Called the deputy just clean it well  5. CIRCUMSTANCES: \"Tell me how this happened.\"       Riding his bike and he reach down to spray him with water and the dog clamped down on his right hand. The bite went through his gloves and it broke the skin and it did bleed.   6. TETANUS: \"When was the last tetanus booster?\"      Not sure. Just had his wellness check and physical   7. PREGNANCY: \"Is there any chance you are pregnant?\" \"When was your last menstrual period?\"      Na    Protocols used: ANIMAL BITE-A-OH    "

## 2020-09-15 NOTE — TELEPHONE ENCOUNTER
States pt was bitten by a dog yesterday. They said the dog hasn't had any shots and they are watching the dog in quarantine. Would like to know if there is something he should do or watch for

## 2020-09-16 ASSESSMENT — ENCOUNTER SYMPTOMS
SHORTNESS OF BREATH: 0
ABDOMINAL PAIN: 0
DIFFICULTY URINATING: 0
EYE REDNESS: 0
ARTHRALGIAS: 0
FEVER: 0
HEADACHES: 0
COLOR CHANGE: 0
NECK STIFFNESS: 0
CONFUSION: 0

## 2020-09-21 ENCOUNTER — TELEPHONE (OUTPATIENT)
Dept: INTERNAL MEDICINE | Facility: OTHER | Age: 65
End: 2020-09-21

## 2020-09-21 NOTE — TELEPHONE ENCOUNTER
Called [facility] and informed patient no longer taking as per telephone note on 9/14/2020.  New Rx's sent for Lisinopril and hydrochlorothiazide  Ebony Constantino RN on 9/21/2020 at 1:11 PM

## 2020-11-27 ENCOUNTER — ALLIED HEALTH/NURSE VISIT (OUTPATIENT)
Dept: FAMILY MEDICINE | Facility: OTHER | Age: 65
End: 2020-11-27
Attending: FAMILY MEDICINE
Payer: MEDICARE

## 2020-11-27 DIAGNOSIS — R51.9 HEADACHE: Primary | ICD-10-CM

## 2020-11-27 PROCEDURE — C9803 HOPD COVID-19 SPEC COLLECT: HCPCS

## 2020-11-27 PROCEDURE — 99207 PR NO CHARGE LOS: CPT

## 2020-11-27 PROCEDURE — U0003 INFECTIOUS AGENT DETECTION BY NUCLEIC ACID (DNA OR RNA); SEVERE ACUTE RESPIRATORY SYNDROME CORONAVIRUS 2 (SARS-COV-2) (CORONAVIRUS DISEASE [COVID-19]), AMPLIFIED PROBE TECHNIQUE, MAKING USE OF HIGH THROUGHPUT TECHNOLOGIES AS DESCRIBED BY CMS-2020-01-R: HCPCS | Mod: ZL | Performed by: FAMILY MEDICINE

## 2020-11-27 NOTE — PROGRESS NOTES
Patient swabbed for COVID-19 testing.  Norma J. Gosselin, LPN on 11/27/2020 at 3:50 PM    Headache

## 2020-11-29 LAB
SARS-COV-2 RNA SPEC QL NAA+PROBE: NOT DETECTED
SPECIMEN SOURCE: NORMAL

## 2020-12-01 ENCOUNTER — OFFICE VISIT (OUTPATIENT)
Dept: FAMILY MEDICINE | Facility: OTHER | Age: 65
End: 2020-12-01
Attending: FAMILY MEDICINE
Payer: MEDICARE

## 2020-12-01 VITALS
DIASTOLIC BLOOD PRESSURE: 64 MMHG | OXYGEN SATURATION: 99 % | SYSTOLIC BLOOD PRESSURE: 138 MMHG | RESPIRATION RATE: 18 BRPM | TEMPERATURE: 97.8 F | WEIGHT: 180.6 LBS | HEART RATE: 52 BPM | BODY MASS INDEX: 25.19 KG/M2

## 2020-12-01 DIAGNOSIS — I10 BENIGN ESSENTIAL HYPERTENSION: ICD-10-CM

## 2020-12-01 DIAGNOSIS — R42 DIZZINESS: Primary | ICD-10-CM

## 2020-12-01 DIAGNOSIS — F41.9 ANXIETY: ICD-10-CM

## 2020-12-01 PROCEDURE — G0463 HOSPITAL OUTPT CLINIC VISIT: HCPCS

## 2020-12-01 PROCEDURE — 99214 OFFICE O/P EST MOD 30 MIN: CPT | Performed by: FAMILY MEDICINE

## 2020-12-01 RX ORDER — LOSARTAN POTASSIUM AND HYDROCHLOROTHIAZIDE 12.5; 5 MG/1; MG/1
1 TABLET ORAL DAILY
COMMUNITY
Start: 2020-08-25 | End: 2021-04-13

## 2020-12-01 ASSESSMENT — PAIN SCALES - GENERAL: PAINLEVEL: NO PAIN (0)

## 2020-12-01 ASSESSMENT — ANXIETY QUESTIONNAIRES
1. FEELING NERVOUS, ANXIOUS, OR ON EDGE: SEVERAL DAYS
5. BEING SO RESTLESS THAT IT IS HARD TO SIT STILL: SEVERAL DAYS
6. BECOMING EASILY ANNOYED OR IRRITABLE: NOT AT ALL
3. WORRYING TOO MUCH ABOUT DIFFERENT THINGS: SEVERAL DAYS
7. FEELING AFRAID AS IF SOMETHING AWFUL MIGHT HAPPEN: SEVERAL DAYS
2. NOT BEING ABLE TO STOP OR CONTROL WORRYING: NOT AT ALL
GAD7 TOTAL SCORE: 5
IF YOU CHECKED OFF ANY PROBLEMS ON THIS QUESTIONNAIRE, HOW DIFFICULT HAVE THESE PROBLEMS MADE IT FOR YOU TO DO YOUR WORK, TAKE CARE OF THINGS AT HOME, OR GET ALONG WITH OTHER PEOPLE: SOMEWHAT DIFFICULT

## 2020-12-01 ASSESSMENT — PATIENT HEALTH QUESTIONNAIRE - PHQ9: 5. POOR APPETITE OR OVEREATING: SEVERAL DAYS

## 2020-12-01 NOTE — PROGRESS NOTES
"Nursing Notes:   Pamela Fox LPN  12/1/2020  2:02 PM  Signed  Patient presents to clinic for headaches and associated dizziness. Patient states he feels \"off center/off balance\" like he has had alcohol. States he was bit by a dog on Sept 14, 2020 and wasn't sure if the dog was vaccinated or not. States he has had a lot of anxiety associated with how he has been feeling. States his blood pressures have been much higher than normal as well.  Chief Complaint   Patient presents with     Headache     dizzyness       Initial /64 (BP Location: Right arm, Patient Position: Sitting, Cuff Size: Adult Regular)   Pulse 52   Temp 97.8  F (36.6  C) (Tympanic)   Resp 18   Wt 81.9 kg (180 lb 9.6 oz)   SpO2 99%   BMI 25.19 kg/m   Estimated body mass index is 25.19 kg/m  as calculated from the following:    Height as of 9/15/20: 1.803 m (5' 11\").    Weight as of this encounter: 81.9 kg (180 lb 9.6 oz).  Medication Reconciliation: complete    Pamela Fox LPN        SUBJECTIVE:  Anjel Payton  is a 65 year old male who comes in today because of some dizziness.  He has felt a little off center or off balance as if he had been drinking but he has not been.  He has had some headaches. He donated blood on November 20.      He has had had symptoms for 4-5 days.  No change in hearing. No ringing or fullness.  He has had some mild tension headache. He wears a helmet fat biking that causes him to extend his neck.  He has had a bit of motion sensation but now is some better.     He has had a bit more anxiety associated with how he has been feeling.  Had a dog bite back in September but it is unlikely that this has anything to do with his current symptoms, but he has been very anxious about this especially in relation to his blood donation on November 20 as he was concerned that if he might of contracted rabies that he could spread that to someone else.    He has a history in the past of gastric ulcer and reflux associated " with helical back to pylori.  He has hypertension for which he is taking lisinopril and hydrochlorothiazide.  They had switched him to losartan for a short period of time because of a cough but that did not make any difference, so they switched back.  He has had no change in bowel habits.    ESE-7 SCORE 10/26/2016 2020   Total Score 0 5       He is up-to-date on immunizations but has not had flu shot but typically does not get that.  Has not had Shingrix.  He had normal labs in August.  He had negative Covid test 4 days ago.    Past Medical, Family, and Social History reviewed and updated as noted below.   ROS is negative except as noted above       No Known Allergies,   Family History   Problem Relation Age of Onset     Colon Cancer Mother          age 60 or 61     Diabetes Father         Diabetes, post renal transplant secondary to diabetic complications,     Hypertension Father      Heart Disease Father          of myocardial infarction at age 57     Heart Disease Brother         With bicuspid aortic valve, status post aortic valve replacement     Breast Cancer Sister    ,   Current Outpatient Medications   Medication     aspirin (ASA) 81 MG tablet     hydrochlorothiazide (HYDRODIURIL) 12.5 MG tablet     lisinopril (ZESTRIL) 20 MG tablet     losartan-hydrochlorothiazide (HYZAAR) 50-12.5 MG tablet     Multiple Vitamin (MULTI-VITAMINS) TABS     No current facility-administered medications for this visit.    ,   Past Medical History:   Diagnosis Date     Acute gastric ulcer due to Helicobacter pylori      Essential (primary) hypertension     No Comments Provided     Lower urinary tract symptoms (LUTS)      Tubular adenoma    ,   Patient Active Problem List    Diagnosis Date Noted     Acute gastric ulcer due to Helicobacter pylori      Priority: Medium     Gastroesophageal reflux disease with esophagitis 2019     Priority: Medium     Acute ulcer of  antrum 2019     Priority: Medium     Lower  urinary tract symptoms (LUTS) 2018     Priority: Medium     H/O adenomatous polyp of colon 2018     Priority: Medium     Family history of colon cancer mom  62 2018     Priority: Medium     Benign essential hypertension 2018     Priority: Medium     Lumbago 2018     Priority: Medium     Overview:   with L5-S1 disc protrusion, moderate and asymmetric to the right with right S1   nerve rootlet displaced posteriorly       Vegetarian 2014     Priority: Medium   ,   Past Surgical History:   Procedure Laterality Date     COLONOSCOPY      10/5/09     COLONOSCOPY N/A 2018    F/U   tubular adenomas     ENDOSCOPY  2019    EGD antral ulcer     ESOPHAGOSCOPY, GASTROSCOPY, DUODENOSCOPY (EGD), COMBINED N/A 2019    Procedure: ESOPHAGOGASTRODUODENOSCOPY, WITH BIOPSY;  Surgeon: Jorge Mcclain MD;  Location: [facility] OR     HERNIA REPAIR, UMBILICAL       LAPAROSCOPIC CHOLECYSTECTOMY  2017     RELEASE CARPAL TUNNEL Right     and   Social History     Tobacco Use     Smoking status: Former Smoker     Quit date: 10/25/1975     Years since quittin.1     Smokeless tobacco: Former User     Quit date: 10/25/1998   Substance Use Topics     Alcohol use: Yes     Alcohol/week: 0.8 standard drinks     Frequency: 2-4 times a month     Drinks per session: 1 or 2     Comment: 1 weekly      OBJECTIVE:  /64 (BP Location: Right arm, Patient Position: Sitting, Cuff Size: Adult Regular)   Pulse 52   Temp 97.8  F (36.6  C) (Tympanic)   Resp 18   Wt 81.9 kg (180 lb 9.6 oz)   SpO2 99%   BMI 25.19 kg/m     EXAM:  Alert and cooperative, no distress.  He is anxious but feels better after we talk.  He gets up onto the exam table without difficulty.  Neck range of motion is full and does not reproduce any symptoms.  Lungs are clear, no rales rhonchi or wheezes are heard.  Cardiac RRR without murmur.  He has no tenderness in his neck and no audible bruits.  No thyromegaly.  He has no  tenderness to palpation of the occipital nerve.  TMs appear clear bilaterally.  Throat is clear.  ASSESSMENT/Plan :    Anjel was seen today for headache.    Diagnoses and all orders for this visit:    Dizziness    Anxiety    Benign essential hypertension      I think a lot of his symptoms seem to be related to anxiety.  He was reassured that his dog bite from mid-September probably does not have any bearing on anything that is going on now and certainly is not worrisome for rabies.  His dizziness symptoms are difficult to characterize.  We described lightheadedness from lower blood pressure with position change and he says that that is somewhat how he is feeling but then when we describe the motion sensation associated with vertigo he thinks may be that describes it.  We discussed the pathophysiology of inner ear dysfunction but I really do not think he has Ménière's disease or positional vertigo classically.  I suspect that his symptoms may be multifactorial and with some anxious overlay.  He may have some occipital nerve irritation from wearing his biking helmet but currently it things have settled down.    In general he is a very healthy individual and is very active and exercises regularly.  He will keep in touch with his progress if he is having further difficulties but seemed reassured by our conversation.    A total of 25 minutes was spent with the patient, greater than 50% of the time was spent in counseling/discussion of the aforementioned concerns.     Felice Sumner MD

## 2020-12-02 ASSESSMENT — ANXIETY QUESTIONNAIRES: GAD7 TOTAL SCORE: 5

## 2020-12-14 ENCOUNTER — HEALTH MAINTENANCE LETTER (OUTPATIENT)
Age: 65
End: 2020-12-14

## 2020-12-18 ENCOUNTER — ALLIED HEALTH/NURSE VISIT (OUTPATIENT)
Dept: FAMILY MEDICINE | Facility: OTHER | Age: 65
End: 2020-12-18
Attending: FAMILY MEDICINE
Payer: MEDICARE

## 2020-12-18 DIAGNOSIS — R51.9 HEADACHE: Primary | ICD-10-CM

## 2020-12-18 DIAGNOSIS — Z20.822 EXPOSURE TO 2019 NOVEL CORONAVIRUS: ICD-10-CM

## 2020-12-18 PROCEDURE — U0003 INFECTIOUS AGENT DETECTION BY NUCLEIC ACID (DNA OR RNA); SEVERE ACUTE RESPIRATORY SYNDROME CORONAVIRUS 2 (SARS-COV-2) (CORONAVIRUS DISEASE [COVID-19]), AMPLIFIED PROBE TECHNIQUE, MAKING USE OF HIGH THROUGHPUT TECHNOLOGIES AS DESCRIBED BY CMS-2020-01-R: HCPCS | Mod: ZL | Performed by: FAMILY MEDICINE

## 2020-12-18 PROCEDURE — C9803 HOPD COVID-19 SPEC COLLECT: HCPCS

## 2020-12-18 NOTE — NURSING NOTE
Patient swabbed for COVID-19 testing.  Exposed - Symptomatic  Radha Richardson LPN on 12/18/2020 at 2:30 PM

## 2020-12-19 LAB
SARS-COV-2 RNA SPEC QL NAA+PROBE: NOT DETECTED
SPECIMEN SOURCE: NORMAL

## 2021-04-13 ENCOUNTER — OFFICE VISIT (OUTPATIENT)
Dept: PEDIATRICS | Facility: OTHER | Age: 66
End: 2021-04-13
Attending: INTERNAL MEDICINE
Payer: MEDICARE

## 2021-04-13 VITALS
DIASTOLIC BLOOD PRESSURE: 88 MMHG | WEIGHT: 183 LBS | OXYGEN SATURATION: 99 % | BODY MASS INDEX: 25.52 KG/M2 | SYSTOLIC BLOOD PRESSURE: 148 MMHG | HEART RATE: 46 BPM | TEMPERATURE: 97.4 F | RESPIRATION RATE: 16 BRPM

## 2021-04-13 DIAGNOSIS — J01.90 ACUTE SINUSITIS, RECURRENCE NOT SPECIFIED, UNSPECIFIED LOCATION: Primary | ICD-10-CM

## 2021-04-13 DIAGNOSIS — I10 BENIGN ESSENTIAL HYPERTENSION: ICD-10-CM

## 2021-04-13 PROCEDURE — 99214 OFFICE O/P EST MOD 30 MIN: CPT | Performed by: INTERNAL MEDICINE

## 2021-04-13 PROCEDURE — G0463 HOSPITAL OUTPT CLINIC VISIT: HCPCS

## 2021-04-13 RX ORDER — HYDROCHLOROTHIAZIDE 12.5 MG/1
25 TABLET ORAL DAILY
Qty: 180 TABLET | Refills: 0 | Status: SHIPPED | OUTPATIENT
Start: 2021-04-13 | End: 2021-08-02

## 2021-04-13 ASSESSMENT — PAIN SCALES - GENERAL: PAINLEVEL: MILD PAIN (2)

## 2021-04-13 NOTE — PROGRESS NOTES
Subjective   Anjel Payotn is a 65 year old male who presents for multiple concerns.  Over the last 2-1/2 weeks he has developed a conglomeration of symptoms including head congestion, minor headache, tingling in ears, foggy feeling, slightly dizzy off and on.  He took some Zyrtec and he thinks he might of got a little better.  No known history of spring allergies.  No sneezing.  No postnasal drainage.  No heartburn.  No hearing changes.  He thinks his ear might hurt a little bit.  He has had some pain behind the eyes.  Normally his blood pressure is 125-130 but this morning it was the same as we got 148.  He did gain a little weight over the winter.  He has been very active getting his heart rate up to 160 with exercise, without chest pain.    Objective   Vitals: BP (!) 148/88 (BP Location: Left arm, Patient Position: Sitting, Cuff Size: Adult Regular)   Pulse (!) 46   Temp 97.4  F (36.3  C) (Tympanic)   Resp 16   Wt 83 kg (183 lb)   SpO2 99%   BMI 25.52 kg/m      General: well appearing  HEENT: There is a small amount of clear fluid behind the tympanic membranes, no erythema or pus.  Nasal turbinates are pink.  No OP erythema.  Neck: No JVD, no bruits no lymphadenopathy.  CV: Regular rate and rhythm, no murmur, rub or gallop  Pulm: Clear to auscultation bilaterally, no wheezing, rales or rhonchi  Psychiatry: Normal affect and insight.    Review and Analysis of Data   I personally reviewed the following:  External notes: No  Results: Yes  Use of an independent historian: No  Independent review of a test performed by another physician: No  Discussion of management with another physician: No  Moderate risk of morbidity from additional diagnostic testing and/or treatment.    Assessment & Plan   1. Acute sinusitis, recurrence not specified, unspecified location  Differential diagnosis includes allergic rhinosinusitis, other sinus irritation such as sawdust or snow mold, acid reflux causing pharyngeal or sinus  inflammation, early bacterial sinusitis, others.  I recommended he start with Neti pot.  We considered nasal steroid but decided against it since this is his first season.  If symptoms worsen to include thick green nasal drainage, sinus and teeth pain would send antibiotics at that time.    2. Benign essential hypertension  His blood pressure is uncontrolled.  It seems as though the only variable that has changed is his weight.  We discussed options and decided to increase hydrochlorothiazide.  Warning signs were discussed.  Lab only visit for recheck BMP in 2 weeks is recommended.  - hydrochlorothiazide (HYDRODIURIL) 12.5 MG tablet; Take 2 tablets (25 mg) by mouth daily  Dispense: 180 tablet; Refill: 0  - Basic Metabolic Panel; Future      Patient Instructions    -- Try nasal saline irrigation (with distilled water)    Nasal saline spray    NeilMed sinus rinse    Neti pot   -- Start nasal steroid spray daily (eg Nasacort, Flonase)   -- When using steroid spray: tilt head forward, spray away from center septum, don't sniff deeply during inhalation.   -- Steroid spray works best when used consistently, not as needed.   -- Okay to start daily Claritin/Allegra/Zyrtec (without -D), can help for sneezing, itchy eyes, etc.   -- Okay to use diphenhydramine (Benadryl) as needed for sneezing, nasal congestion, can cause dry mouth, urinary retention, blurry vision, constipation   -- Okay to briefly use oxymetazoline (Afrin) 2 sprays both nostrils, nightly for 3-4 days, then stop as can cause rebound congestion   -- Shower/bathe before bed to wash pollen away   -- Elevate head of bed to facilitate sinus drainage   -- Consider getting a HEPA filter   -- Use a cool mist humidifier during the dry season, clean weekly with vinegar     -- Increase hydrochlorothiazide to 25 mg   -- Work on 5% weight loss   -- recheck BMP in 2 weeks    Check your Blood Pressure   -- Sit in a chair, feet flat on the floor for 5 minutes   -- Avoid  caffeine, exercise and smoking for 30 minutes before checking   -- Have your arm at the level of your heart   -- Make sure you have the correct size cuff   -- Write them down, bring your log book in to your appointment     -- Goal blood pressure 120/70   -- Learn about DASH Diet (http://bit.Cytheris/DASHDiet - redirects to the orderbolt) for dietary ways to reduce blood pressure   -- Work on 5% weight loss   -- Daily physical exercise (eg. 30 min brisk walk)     Your BMI is Body mass index is 25.52 kg/m .  (BMI ranges: Normal 18.5 - 25, Overweight 25 - 30, Obesity greater than 30,     Morbid Obesity greater than 40 or greater than 35 with associated conditions.)    Facts about losing weight:   -- Overweight and Obesity increase your risk for developing diabetes, high blood pressure and stroke, and shorten your life.   -- 90% of weight loss comes from dietary changes, only 10% from exercise    What should I do?   -- Work on 5-10% weight loss   -- Focus on a few healthy dietary changes   -- Eat more fresh fruits and vegetables, and fewer carbohydrates   -- Cut out all calorie-containing beverages (milk, juice, alcohol, etc)   -- Exercise every day   -- Weigh yourself once a week   -- Consider the DASH Diet (http://http://bit.ly/DASHDiet - redirects to the orderbolt)   -- Consider Weight Watchers (http://www.weightwatchers.com)   -- Consider My Fitness Pal (iOS, Android, [email or web address] [email or web address])              Signed, Tyler Almonte MD, FAAP, FACP  Internal Medicine & Pediatrics

## 2021-04-13 NOTE — PATIENT INSTRUCTIONS
-- Try nasal saline irrigation (with distilled water)    Nasal saline spray    NeilMed sinus rinse    Neti pot   -- Start nasal steroid spray daily (eg Nasacort, Flonase)   -- When using steroid spray: tilt head forward, spray away from center septum, don't sniff deeply during inhalation.   -- Steroid spray works best when used consistently, not as needed.   -- Okay to start daily Claritin/Allegra/Zyrtec (without -D), can help for sneezing, itchy eyes, etc.   -- Okay to use diphenhydramine (Benadryl) as needed for sneezing, nasal congestion, can cause dry mouth, urinary retention, blurry vision, constipation   -- Okay to briefly use oxymetazoline (Afrin) 2 sprays both nostrils, nightly for 3-4 days, then stop as can cause rebound congestion   -- Shower/bathe before bed to wash pollen away   -- Elevate head of bed to facilitate sinus drainage   -- Consider getting a HEPA filter   -- Use a cool mist humidifier during the dry season, clean weekly with vinegar     -- Increase hydrochlorothiazide to 25 mg   -- Work on 5% weight loss   -- recheck BMP in 2 weeks    Check your Blood Pressure   -- Sit in a chair, feet flat on the floor for 5 minutes   -- Avoid caffeine, exercise and smoking for 30 minutes before checking   -- Have your arm at the level of your heart   -- Make sure you have the correct size cuff   -- Write them down, bring your log book in to your appointment     -- Goal blood pressure 120/70   -- Learn about DASH Diet (http://bit.[employer]/DASHDiet - redirects to the [facility]) for dietary ways to reduce blood pressure   -- Work on 5% weight loss   -- Daily physical exercise (eg. 30 min brisk walk)     Your BMI is Body mass index is 25.52 kg/m .  (BMI ranges: Normal 18.5 - 25, Overweight 25 - 30, Obesity greater than 30,     Morbid Obesity greater than 40 or greater than 35 with associated conditions.)    Facts about losing weight:   -- Overweight and Obesity increase your risk for developing diabetes, high blood  pressure and stroke, and shorten your life.   -- 90% of weight loss comes from dietary changes, only 10% from exercise    What should I do?   -- Work on 5-10% weight loss   -- Focus on a few healthy dietary changes   -- Eat more fresh fruits and vegetables, and fewer carbohydrates   -- Cut out all calorie-containing beverages (milk, juice, alcohol, etc)   -- Exercise every day   -- Weigh yourself once a week   -- Consider the DASH Diet (http://http://bit.ly/DASHDiet - redirects to the [employer])   -- Consider Weight Watchers (http://www.weightwatchers.IIIMOBI)   -- Consider My Fitness Pal (iOS, Android, http://www.[employer]ipherCloud.com)

## 2021-04-13 NOTE — NURSING NOTE
"Patient presents to clinic for headaches and \"foggyness\" that started about 2 weeks ago.  Chief Complaint   Patient presents with     Headache       Initial BP (!) 150/88 (BP Location: Right arm, Patient Position: Sitting, Cuff Size: Adult Regular)   Pulse (!) 46   Temp 97.4  F (36.3  C) (Tympanic)   Resp 16   Wt 83 kg (183 lb)   SpO2 99%   BMI 25.52 kg/m   Estimated body mass index is 25.52 kg/m  as calculated from the following:    Height as of 9/15/20: 1.803 m (5' 11\").    Weight as of this encounter: 83 kg (183 lb).  Medication Reconciliation: complete    Pamela Fox LPN    "

## 2021-04-19 ENCOUNTER — ALLIED HEALTH/NURSE VISIT (OUTPATIENT)
Dept: FAMILY MEDICINE | Facility: OTHER | Age: 66
End: 2021-04-19
Attending: FAMILY MEDICINE
Payer: MEDICARE

## 2021-04-19 DIAGNOSIS — R53.83 FATIGUE: ICD-10-CM

## 2021-04-19 DIAGNOSIS — R51.9 HEADACHE: Primary | ICD-10-CM

## 2021-04-19 DIAGNOSIS — R42 DIZZINESS: ICD-10-CM

## 2021-04-19 PROCEDURE — U0003 INFECTIOUS AGENT DETECTION BY NUCLEIC ACID (DNA OR RNA); SEVERE ACUTE RESPIRATORY SYNDROME CORONAVIRUS 2 (SARS-COV-2) (CORONAVIRUS DISEASE [COVID-19]), AMPLIFIED PROBE TECHNIQUE, MAKING USE OF HIGH THROUGHPUT TECHNOLOGIES AS DESCRIBED BY CMS-2020-01-R: HCPCS | Mod: ZL | Performed by: FAMILY MEDICINE

## 2021-04-19 PROCEDURE — C9803 HOPD COVID-19 SPEC COLLECT: HCPCS

## 2021-04-19 PROCEDURE — U0005 INFEC AGEN DETEC AMPLI PROBE: HCPCS | Mod: ZL | Performed by: FAMILY MEDICINE

## 2021-04-20 LAB
LABORATORY COMMENT REPORT: NORMAL
SARS-COV-2 RNA RESP QL NAA+PROBE: NEGATIVE
SARS-COV-2 RNA RESP QL NAA+PROBE: NORMAL
SPECIMEN SOURCE: NORMAL
SPECIMEN SOURCE: NORMAL

## 2021-04-26 DIAGNOSIS — I10 BENIGN ESSENTIAL HYPERTENSION: ICD-10-CM

## 2021-04-26 LAB
ANION GAP SERPL CALCULATED.3IONS-SCNC: 5 MMOL/L (ref 3–14)
BUN SERPL-MCNC: 16 MG/DL (ref 7–25)
CALCIUM SERPL-MCNC: 10.1 MG/DL (ref 8.6–10.3)
CHLORIDE SERPL-SCNC: 101 MMOL/L (ref 98–107)
CO2 SERPL-SCNC: 33 MMOL/L (ref 21–31)
CREAT SERPL-MCNC: 0.71 MG/DL (ref 0.7–1.3)
GFR SERPL CREATININE-BSD FRML MDRD: >90 ML/MIN/{1.73_M2}
GLUCOSE SERPL-MCNC: 103 MG/DL (ref 70–105)
POTASSIUM SERPL-SCNC: 4.2 MMOL/L (ref 3.5–5.1)
SODIUM SERPL-SCNC: 139 MMOL/L (ref 134–144)

## 2021-04-26 PROCEDURE — 36415 COLL VENOUS BLD VENIPUNCTURE: CPT | Mod: ZL | Performed by: INTERNAL MEDICINE

## 2021-04-26 PROCEDURE — 80048 BASIC METABOLIC PNL TOTAL CA: CPT | Mod: ZL | Performed by: INTERNAL MEDICINE

## 2021-04-28 ENCOUNTER — OFFICE VISIT (OUTPATIENT)
Dept: INTERNAL MEDICINE | Facility: OTHER | Age: 66
End: 2021-04-28
Attending: INTERNAL MEDICINE
Payer: MEDICARE

## 2021-04-28 VITALS
BODY MASS INDEX: 24.21 KG/M2 | SYSTOLIC BLOOD PRESSURE: 122 MMHG | RESPIRATION RATE: 19 BRPM | TEMPERATURE: 98.3 F | OXYGEN SATURATION: 98 % | HEART RATE: 61 BPM | WEIGHT: 173.6 LBS | DIASTOLIC BLOOD PRESSURE: 76 MMHG

## 2021-04-28 DIAGNOSIS — R42 DIZZINESS: Primary | ICD-10-CM

## 2021-04-28 DIAGNOSIS — R51.9 ACUTE NONINTRACTABLE HEADACHE, UNSPECIFIED HEADACHE TYPE: ICD-10-CM

## 2021-04-28 DIAGNOSIS — I10 BENIGN ESSENTIAL HYPERTENSION: ICD-10-CM

## 2021-04-28 PROCEDURE — G0463 HOSPITAL OUTPT CLINIC VISIT: HCPCS

## 2021-04-28 PROCEDURE — 99214 OFFICE O/P EST MOD 30 MIN: CPT | Performed by: INTERNAL MEDICINE

## 2021-04-28 ASSESSMENT — PAIN SCALES - GENERAL: PAINLEVEL: NO PAIN (0)

## 2021-04-28 NOTE — PROGRESS NOTES
Chief Complaint:  Multiple concerns.    HPI: He is in today for few things.  First of all he is here for follow-up on his blood pressure.  He was on lisinopril 20 mg daily and hydrochlorothiazide 12.5 mg daily.  Dr. Almonte increased him to 25 mg on the hydrochlorothiazide as his blood pressure was a little bit high.  He had a basic metabolic panel done recently with that increase and that returned normal.  He feels well with the new dose.  He does not want a combined preparation as he had adverse effects with that in the past.  He checks his blood pressure at home and it is doing well.    He also has been having some head issues.  He has some ear discomfort as well as some ringing in his ears.  He has had some retro-orbital pain and discomfort.  His balance seems to be a little bit off at times.  He was recently in to see Dr. Almonte as mentioned and was given some instructions to treat for sinus inflammation including Flonase and Osborn Oquendo.  He has done that with no improvement in his symptoms.  He is concerned because he had a fall with a head injury a year and a half ago.  At that time he had a normal CT.  I reassured him that I did not think his current symptoms could be related to that.  All of the symptoms have been for about 5 weeks, which correlates fairly closely with his second Covid vaccine.    Past Medical History:   Diagnosis Date     Acute gastric ulcer due to Helicobacter pylori      Essential (primary) hypertension     No Comments Provided     Lower urinary tract symptoms (LUTS)      Tubular adenoma        Past Surgical History:   Procedure Laterality Date     COLONOSCOPY      10/5/09     COLONOSCOPY N/A 7/16/2018    F/U  2023 tubular adenomas     ENDOSCOPY  04/29/2019    EGD antral ulcer     ESOPHAGOSCOPY, GASTROSCOPY, DUODENOSCOPY (EGD), COMBINED N/A 4/29/2019    Procedure: ESOPHAGOGASTRODUODENOSCOPY, WITH BIOPSY;  Surgeon: Jorge Mcclain MD;  Location: [facility] OR     HERNIA REPAIR, UMBILICAL  2009      LAPAROSCOPIC CHOLECYSTECTOMY  2017     RELEASE CARPAL TUNNEL Right        No Known Allergies    Current Outpatient Medications   Medication Sig Dispense Refill     aspirin (ASA) 81 MG tablet Take 1 tablet (81 mg) by mouth daily with food       hydrochlorothiazide (HYDRODIURIL) 12.5 MG tablet Take 2 tablets (25 mg) by mouth daily 180 tablet 0     lisinopril (ZESTRIL) 20 MG tablet Take 1 tablet (20 mg) by mouth daily 90 tablet 3     Multiple Vitamin (MULTI-VITAMINS) TABS Take 1 tablet by mouth daily         Social History     Socioeconomic History     Marital status:      Spouse name: Not on file     Number of children: Not on file     Years of education: Not on file     Highest education level: Not on file   Occupational History     Not on file   Social Needs     Financial resource strain: Not on file     Food insecurity     Worry: Not on file     Inability: Not on file     Transportation needs     Medical: Not on file     Non-medical: Not on file   Tobacco Use     Smoking status: Former Smoker     Quit date: 10/25/1975     Years since quittin.5     Smokeless tobacco: Former User     Quit date: 10/25/1998   Substance and Sexual Activity     Alcohol use: Yes     Alcohol/week: 0.8 standard drinks     Frequency: 2-4 times a month     Drinks per session: 1 or 2     Comment: 1 weekly      Drug use: No     Comment: Drug use: No     Sexual activity: Yes     Partners: Female   Lifestyle     Physical activity     Days per week: Not on file     Minutes per session: Not on file     Stress: Not on file   Relationships     Social connections     Talks on phone: Not on file     Gets together: Not on file     Attends [employer]ology service: Not on file     Active member of club or organization: Not on file     Attends meetings of clubs or organizations: Not on file     Relationship status: Not on file     Intimate partner violence     Fear of current or ex partner: Not on file     Emotionally abused: Not on file      Physically abused: Not on file     Forced sexual activity: Not on file   Other Topics Concern     Parent/sibling w/ CABG, MI or angioplasty before 65F 55M? Not Asked   Social History Narrative    Vegetarian.  , retired from  the mines.  Rides bike daily.  Lives in [place].       Review of Systems   Constitutional: Negative.    Endocrine: Negative.    Skin: Negative.    Allergic/Immunologic: Negative.    Hematological: Negative.        Physical Exam  Vitals signs and nursing note reviewed.   Constitutional:       General: He is not in acute distress.     Appearance: Normal appearance. He is not ill-appearing, toxic-appearing or diaphoretic.   HENT:      Head: Normocephalic and atraumatic.      Comments: No tenderness over the temporal arteries     Right Ear: Tympanic membrane and ear canal normal.      Left Ear: Tympanic membrane and ear canal normal.      Nose: Nose normal.      Comments: No sinus tenderness.      Mouth/Throat:      Mouth: Mucous membranes are moist.      Pharynx: Oropharynx is clear. No oropharyngeal exudate.   Eyes:      Pupils: Pupils are equal, round, and reactive to light.   Neck:      Musculoskeletal: Normal range of motion and neck supple.   Neurological:      Mental Status: He is alert.         Assessment:      ICD-10-CM    1. Dizziness  R42 MR Brain w/o & w Contrast   2. Acute nonintractable headache, unspecified headache type  R51.9 MR Brain w/o & w Contrast   3. Benign essential hypertension  I10        Plan: His blood pressure is well controlled and his recent lab is acceptable so he will continue with the 25 mg of hydrochlorothiazide in place of the 12.5 mg.  He is comfortable with that.    In regards to his headache, dizziness, etc. he is thinking that he should have an MRI of the brain.  I do not think that this is unreasonable although I think the likelihood of finding any pathology is really quite small.  This was reviewed with him.  I suggested that this could be a  viral syndrome or possibly related to his Covid vaccine and those things needed to be considered.  We will get an MRI of the brain and I will let him know the results.  Obviously if he has abnormalities including evidence for sinus disease, will treat accordingly.

## 2021-04-28 NOTE — NURSING NOTE
Patient presents to the clinic today for multiple concerns- dizziness, fogginess, sinus pressure, ringing in the ears, temples throbbing, and his skin on his head will feel tight. All of this has been going on for about 5 weeks. He saw Dr. Almonte a couple weeks ago and he has been trying the Neti Pot, no relief. Also tried flonase, slight relief.     Ish Sarabia, LPN on 4/28/2021 at 1:10 PM

## 2021-04-30 ENCOUNTER — HOSPITAL ENCOUNTER (OUTPATIENT)
Dept: MRI IMAGING | Facility: OTHER | Age: 66
Discharge: HOME | End: 2021-04-30
Attending: INTERNAL MEDICINE | Admitting: INTERNAL MEDICINE
Payer: MEDICARE

## 2021-04-30 DIAGNOSIS — R42 DIZZINESS: ICD-10-CM

## 2021-04-30 DIAGNOSIS — R51.9 ACUTE NONINTRACTABLE HEADACHE, UNSPECIFIED HEADACHE TYPE: ICD-10-CM

## 2021-04-30 PROCEDURE — 255N000002 HC RX 255 OP 636: Performed by: INTERNAL MEDICINE

## 2021-04-30 PROCEDURE — A9575 INJ GADOTERATE MEGLUMI 0.1ML: HCPCS | Performed by: INTERNAL MEDICINE

## 2021-04-30 PROCEDURE — 70553 MRI BRAIN STEM W/O & W/DYE: CPT | Mod: MG

## 2021-04-30 RX ADMIN — GADOTERATE MEGLUMINE 17 ML: 376.9 INJECTION INTRAVENOUS at 09:48

## 2021-05-09 ENCOUNTER — MYC MEDICAL ADVICE (OUTPATIENT)
Dept: INTERNAL MEDICINE | Facility: OTHER | Age: 66
End: 2021-05-09

## 2021-06-01 ENCOUNTER — OFFICE VISIT (OUTPATIENT)
Dept: PEDIATRICS | Facility: OTHER | Age: 66
End: 2021-06-01
Attending: INTERNAL MEDICINE
Payer: MEDICARE

## 2021-06-01 VITALS
RESPIRATION RATE: 19 BRPM | BODY MASS INDEX: 25.2 KG/M2 | DIASTOLIC BLOOD PRESSURE: 80 MMHG | HEIGHT: 71 IN | HEART RATE: 54 BPM | SYSTOLIC BLOOD PRESSURE: 124 MMHG | WEIGHT: 180 LBS | TEMPERATURE: 98.2 F | OXYGEN SATURATION: 98 %

## 2021-06-01 DIAGNOSIS — J01.90 ACUTE RHINOSINUSITIS: Primary | ICD-10-CM

## 2021-06-01 PROCEDURE — 99213 OFFICE O/P EST LOW 20 MIN: CPT | Performed by: INTERNAL MEDICINE

## 2021-06-01 PROCEDURE — G0463 HOSPITAL OUTPT CLINIC VISIT: HCPCS

## 2021-06-01 ASSESSMENT — MIFFLIN-ST. JEOR: SCORE: 1610.66

## 2021-06-01 ASSESSMENT — PAIN SCALES - GENERAL: PAINLEVEL: NO PAIN (0)

## 2021-06-01 NOTE — PROGRESS NOTES
"Subjective   Anjel Payton is a 65 year old male who presents for follow-up sinuses.  Her last visit was a month ago.  He used Bethany pot but did not find any improvement.  He took Flonase a couple of times and did not take any improvement.  He saw Dr. Carrasco who ordered an MRI which was normal.  He has not developed thick green nasal drainage but does have continual nasal discharge as well as postnasal drainage.  He has intermittent plugging of the left ear.  He gets some teeth pain and saw the dentist and everything was normal.  No sneezing.  No itchy eyes or ears.  No heartburn.    Objective   Vitals: /80 (BP Location: Right arm, Patient Position: Sitting, Cuff Size: Adult Regular)   Pulse 54   Temp 98.2  F (36.8  C) (Tympanic)   Resp 19   Ht 1.791 m (5' 10.5\")   Wt 81.6 kg (180 lb)   SpO2 98%   BMI 25.46 kg/m      HEENT: Tympanic membranes are normal bilaterally    Review and Analysis of Data   I personally reviewed the following:  External notes: No  Results: Yes I reviewed his MRI report  Use of an independent historian: No  Independent review of a test performed by another physician: No  Discussion of management with another physician: No  Moderate risk of morbidity from additional diagnostic testing and/or treatment.    Assessment & Plan   1. Acute rhinosinusitis  Underlying etiology of his sinusitis is uncertain however differential diagnosis includes allergic rhinosinusitis, irritant rhinosinusitis, gastroesophageal reflux disease with eustachian tube dysfunction, others.  It certainly seems reasonable to consider a course of oral antibiotics given the duration of symptoms over a month.  Warning signs discussed and repeat evaluation recommended if not improving as would consider ENT consultation.    - amoxicillin-clavulanate (AUGMENTIN) 875-125 MG tablet; Take 1 tablet by mouth 2 times daily for 10 days  Dispense: 20 tablet; Refill: 0        Patient Instructions    -- Augmentin  -- Eat yogurt " 1-2 times per day while on antibiotics (and for a few weeks after) to reduce the chances of diarrhea     -- Try nasal saline irrigation (with distilled water)    Nasal saline spray    NeilMed sinus rinse    Neti pot   -- Start nasal steroid spray daily (eg Nasacort, Flonase)   -- When using steroid spray: tilt head forward, spray away from center septum, don't sniff deeply during inhalation.   -- Steroid spray works best when used consistently, not as needed.     -- Consider ENT consult if not improving      Return if symptoms worsen or fail to improve.    Signed, Tyler Almonte MD, FAAP, FACP  Internal Medicine & Pediatrics

## 2021-06-01 NOTE — NURSING NOTE
"Chief Complaint   Patient presents with     Sinus Problem     runny nose, ears popping     Patient presents to clinic for follow-up of sinus issues. He states he has runny nose and pressure in his sinuses. He also feels like his ears have been popping and is just in discomfort. This has been ongoing for about a month now.     Initial /80 (BP Location: Right arm, Patient Position: Sitting, Cuff Size: Adult Regular)   Pulse 54   Temp 98.2  F (36.8  C) (Tympanic)   Resp 19   Ht 1.791 m (5' 10.5\")   Wt 81.6 kg (180 lb)   SpO2 98%   BMI 25.46 kg/m   Estimated body mass index is 25.46 kg/m  as calculated from the following:    Height as of this encounter: 1.791 m (5' 10.5\").    Weight as of this encounter: 81.6 kg (180 lb).         Medication Reconciliation: Complete      Elisabeth Grossman   "

## 2021-06-01 NOTE — PATIENT INSTRUCTIONS
-- Augmentin  -- Eat yogurt 1-2 times per day while on antibiotics (and for a few weeks after) to reduce the chances of diarrhea     -- Try nasal saline irrigation (with distilled water)    Nasal saline spray    NeilMed sinus rinse    Neti pot   -- Start nasal steroid spray daily (eg Nasacort, Flonase)   -- When using steroid spray: tilt head forward, spray away from center septum, don't sniff deeply during inhalation.   -- Steroid spray works best when used consistently, not as needed.     -- Consider ENT consult if not improving

## 2021-08-01 DIAGNOSIS — I10 BENIGN ESSENTIAL HYPERTENSION: ICD-10-CM

## 2021-08-02 NOTE — TELEPHONE ENCOUNTER
Requested Prescriptions   Pending Prescriptions Disp Refills     hydrochlorothiazide (HYDRODIURIL) 12.5 MG tablet [Pharmacy Med Name: HYDROCHLOROTHIAZIDE 12.5 MG TB] 180 tablet 0     Sig: TAKE 2 TABLETS (25 MG) BY MOUTH DAILY     Last Written Prescription Date:  4/13/21 [facility]  Last Fill Quantity: 180,   # refills: 0  Last Office Visit: 6/1/21 [facility]  Future Office visit:   none    Routing refill request to provider for review/approval because:  New RN protocol.  Brenda J. Goodell, RN on 8/2/2021 at 3:48 PM

## 2021-08-06 RX ORDER — HYDROCHLOROTHIAZIDE 12.5 MG/1
25 TABLET ORAL DAILY
Qty: 180 TABLET | Refills: 0 | Status: SHIPPED | OUTPATIENT
Start: 2021-08-06 | End: 2021-10-14

## 2021-10-02 ENCOUNTER — HEALTH MAINTENANCE LETTER (OUTPATIENT)
Age: 66
End: 2021-10-02

## 2021-10-03 DIAGNOSIS — I10 BENIGN ESSENTIAL HYPERTENSION: ICD-10-CM

## 2021-10-03 RX ORDER — LISINOPRIL 20 MG/1
TABLET ORAL
Qty: 90 TABLET | Refills: 3 | Status: SHIPPED | OUTPATIENT
Start: 2021-10-03 | End: 2022-12-05

## 2021-10-13 DIAGNOSIS — I10 BENIGN ESSENTIAL HYPERTENSION: ICD-10-CM

## 2021-10-14 RX ORDER — HYDROCHLOROTHIAZIDE 12.5 MG/1
TABLET ORAL
Qty: 90 TABLET | Refills: 3 | Status: SHIPPED | OUTPATIENT
Start: 2021-10-14 | End: 2022-11-01

## 2022-01-22 ENCOUNTER — HEALTH MAINTENANCE LETTER (OUTPATIENT)
Age: 67
End: 2022-01-22

## 2022-02-07 ENCOUNTER — MYC MEDICAL ADVICE (OUTPATIENT)
Dept: INTERNAL MEDICINE | Facility: OTHER | Age: 67
End: 2022-02-07
Payer: MEDICARE

## 2022-02-08 NOTE — TELEPHONE ENCOUNTER
We will review his questions when he is in for a wellness check, that is the purpose of the wellness check.

## 2022-02-17 ENCOUNTER — OFFICE VISIT (OUTPATIENT)
Dept: INTERNAL MEDICINE | Facility: OTHER | Age: 67
End: 2022-02-17
Attending: INTERNAL MEDICINE
Payer: MEDICARE

## 2022-02-17 VITALS
DIASTOLIC BLOOD PRESSURE: 72 MMHG | TEMPERATURE: 97.4 F | HEART RATE: 50 BPM | HEIGHT: 70 IN | SYSTOLIC BLOOD PRESSURE: 114 MMHG | OXYGEN SATURATION: 97 % | WEIGHT: 176.2 LBS | BODY MASS INDEX: 25.22 KG/M2 | RESPIRATION RATE: 18 BRPM

## 2022-02-17 DIAGNOSIS — Z80.0 FAMILY HISTORY OF COLON CANCER: ICD-10-CM

## 2022-02-17 DIAGNOSIS — N42.9 DISORDER OF PROSTATE, UNSPECIFIED: ICD-10-CM

## 2022-02-17 DIAGNOSIS — Z86.0101 H/O ADENOMATOUS POLYP OF COLON: ICD-10-CM

## 2022-02-17 DIAGNOSIS — R39.9 LOWER URINARY TRACT SYMPTOMS (LUTS): ICD-10-CM

## 2022-02-17 DIAGNOSIS — I10 BENIGN ESSENTIAL HYPERTENSION: Primary | ICD-10-CM

## 2022-02-17 LAB
ALBUMIN SERPL-MCNC: 4.9 G/DL (ref 3.5–5.7)
ALP SERPL-CCNC: 37 U/L (ref 34–104)
ALT SERPL W P-5'-P-CCNC: 20 U/L (ref 7–52)
ANION GAP SERPL CALCULATED.3IONS-SCNC: 6 MMOL/L (ref 3–14)
AST SERPL W P-5'-P-CCNC: 31 U/L (ref 13–39)
BILIRUB SERPL-MCNC: 0.7 MG/DL (ref 0.3–1)
BUN SERPL-MCNC: 18 MG/DL (ref 7–25)
CALCIUM SERPL-MCNC: 10.1 MG/DL (ref 8.6–10.3)
CHLORIDE BLD-SCNC: 99 MMOL/L (ref 98–107)
CHOLEST SERPL-MCNC: 156 MG/DL
CO2 SERPL-SCNC: 32 MMOL/L (ref 21–31)
CREAT SERPL-MCNC: 0.76 MG/DL (ref 0.7–1.3)
FASTING STATUS PATIENT QL REPORTED: NORMAL
GFR SERPL CREATININE-BSD FRML MDRD: >90 ML/MIN/1.73M2
GLUCOSE BLD-MCNC: 94 MG/DL (ref 70–105)
HDLC SERPL-MCNC: 54 MG/DL (ref 23–92)
LDLC SERPL CALC-MCNC: 86 MG/DL
NONHDLC SERPL-MCNC: 102 MG/DL
POTASSIUM BLD-SCNC: 4 MMOL/L (ref 3.5–5.1)
PROT SERPL-MCNC: 7.4 G/DL (ref 6.4–8.9)
PSA SERPL-MCNC: 0.75 UG/L (ref 0–4)
SODIUM SERPL-SCNC: 137 MMOL/L (ref 134–144)
TRIGL SERPL-MCNC: 78 MG/DL

## 2022-02-17 PROCEDURE — 80061 LIPID PANEL: CPT | Mod: ZL | Performed by: INTERNAL MEDICINE

## 2022-02-17 PROCEDURE — 80053 COMPREHEN METABOLIC PANEL: CPT | Mod: ZL | Performed by: INTERNAL MEDICINE

## 2022-02-17 PROCEDURE — G0439 PPPS, SUBSEQ VISIT: HCPCS | Performed by: INTERNAL MEDICINE

## 2022-02-17 PROCEDURE — 84153 ASSAY OF PSA TOTAL: CPT | Mod: ZL | Performed by: INTERNAL MEDICINE

## 2022-02-17 PROCEDURE — 36415 COLL VENOUS BLD VENIPUNCTURE: CPT | Mod: ZL | Performed by: INTERNAL MEDICINE

## 2022-02-17 PROCEDURE — G0463 HOSPITAL OUTPT CLINIC VISIT: HCPCS

## 2022-02-17 ASSESSMENT — ACTIVITIES OF DAILY LIVING (ADL): CURRENT_FUNCTION: NO ASSISTANCE NEEDED

## 2022-02-17 ASSESSMENT — PAIN SCALES - GENERAL: PAINLEVEL: NO PAIN (0)

## 2022-02-17 NOTE — LETTER
February 17, 2022      Anjel Payton  [address] [id number]        Dear Anjel,    Below are the results of your recent labs:    Results for orders placed or performed in visit on 02/17/22   Lipid Panel     Status: None   Result Value Ref Range    Cholesterol 156 <200 mg/dL    Triglycerides 78 <150 mg/dL    Direct Measure HDL 54 23 - 92 mg/dL    LDL Cholesterol Calculated 86 <=100 mg/dL    Non HDL Cholesterol 102 <130 mg/dL    Patient Fasting > 8hrs? Unknown     Narrative    Cholesterol  Desirable:  <200 mg/dL    Triglycerides  Normal:  Less than 150 mg/dL  Borderline High:  150-199 mg/dL  High:  200-499 mg/dL  Very High:  Greater than or equal to 500 mg/dL    Direct Measure HDL  Female:  Greater than or equal to 50 mg/dL   Male:  Greater than or equal to 40 mg/dL    LDL Cholesterol  Desirable:  <100mg/dL  Above Desirable:  100-129 mg/dL   Borderline High:  130-159 mg/dL   High:  160-189 mg/dL   Very High:  >= 190 mg/dL    Non HDL Cholesterol  Desirable:  130 mg/dL  Above Desirable:  130-159 mg/dL  Borderline High:  160-189 mg/dL  High:  190-219 mg/dL  Very High:  Greater than or equal to 220 mg/dL   Comprehensive metabolic panel     Status: Abnormal   Result Value Ref Range    Sodium 137 134 - 144 mmol/L    Potassium 4.0 3.5 - 5.1 mmol/L    Chloride 99 98 - 107 mmol/L    Carbon Dioxide (CO2) 32 (H) 21 - 31 mmol/L    Anion Gap 6 3 - 14 mmol/L    Urea Nitrogen 18 7 - 25 mg/dL    Creatinine 0.76 0.70 - 1.30 mg/dL    Calcium 10.1 8.6 - 10.3 mg/dL    Glucose 94 70 - 105 mg/dL    Alkaline Phosphatase 37 34 - 104 U/L    AST 31 13 - 39 U/L    ALT 20 7 - 52 U/L    Protein Total 7.4 6.4 - 8.9 g/dL    Albumin 4.9 3.5 - 5.7 g/dL    Bilirubin Total 0.7 0.3 - 1.0 mg/dL    GFR Estimate >90 >60 mL/min/1.73m2        Your blood tests look normal.  Your PSA is still pending and I will send you those results on Monday.  They will probably be available on my chart before then.  If you have any questions about your  results, feel free to contact me.    Sincerely,        Sanjeev Burns MD  Internal Medicine  [facility]

## 2022-02-17 NOTE — PATIENT INSTRUCTIONS
Continue your medications without change.    Blood tests today, I will send you a letter with the results.    The shot nurse will contact you regarding the shingles vaccine.    CT scan of the heart has been ordered, they will call you to schedule it.  I will let you know the results.    Assuming all goes well, follow-up annually.  Colonoscopy will be due next year.

## 2022-02-17 NOTE — PROGRESS NOTES
SUBJECTIVE:   Anjel Payton is a 66 year old male who presents for Preventive Visit.    He is here today for Medicare wellness physical.  In most respects he is doing fine and has no complaints.  He has hypertension.  He is on 2 drug therapy for control of his blood pressure.  His blood pressure at home has been doing great and he tolerates his medications without difficulty.    He takes aspirin daily.  It does not bother him but he does have a history of an ulcer.  He wonders if he should stay on this.  He has a family history of premature atherosclerosis and cardiac death with his father.  I told him that it would certainly be reasonable to stay on this as long as it was causing no side effects.    He has a history of colonic polyps and wonders if he is due for colonoscopy.  He will be due next summer.  He does have some lower urinary tract symptoms with slowing of his stream and nocturia x1.  He would like to have a PSA done.    He has a past history of smoking but not enough to qualify for lung cancer screening.  He is wondering about that today so we reviewed it.  I recommended that we do a CT calcium score on him to assess his calcium burden with his family history, previous smoking history and hypertension.  He is interested in doing that.    Other than that he feels well.  Medications are reconciled.  Past medical history, past surgical history, family history and social histories are reviewed and updated.  Immunizations are reviewed, he does not get a flu shot.  He would like to get a shingles shot but he needs to get that in the clinic.      Patient has been advised of split billing requirements and indicates understanding: Yes  Are you in the first 12 months of your Medicare coverage?  No    Healthy Habits:     In general, how would you rate your overall health?  Good    Frequency of exercise:  4-5 days/week    Duration of exercise:  Greater than 60 minutes    Do you usually eat at least 4 servings of  "fruit and vegetables a day, include whole grains    & fiber and avoid regularly eating high fat or \"junk\" foods?  Yes    Taking medications regularly:  Yes    Medication side effects:  None    Ability to successfully perform activities of daily living:  No assistance needed    Home Safety:  No safety concerns identified    Hearing Impairment:  No hearing concerns    In the past 6 months, have you been bothered by leaking of urine?  No    In general, how would you rate your overall mental or emotional health?  Good      PHQ-2 Total Score: 0    Additional concerns today:  No    Do you feel safe in your environment? Yes    Have you ever done Advance Care Planning? (For example, a Health Directive, POLST, or a discussion with a medical provider or your loved ones about your wishes): No, advance care planning information given to patient to review.  Patient declined advance care planning discussion at this time.       Fall risk  Fallen 2 or more times in the past year?: No  Any fall with injury in the past year?: No    Cognitive Screening   1) Repeat 3 items (Leader, Season, Table)    2) Clock draw: NORMAL  3) 3 item recall: Recalls 3 objects  Results: 3 items recalled: COGNITIVE IMPAIRMENT LESS LIKELY    Mini-CogTM Copyright S Nadiya. Licensed by the author for use in [facility]; reprinted with permission ([email or web address]@[email or web address]). All rights reserved.      Do you have sleep apnea, excessive snoring or daytime drowsiness?: no    Reviewed and updated as needed this visit by clinical staff   Tobacco  Allergies  Meds  Problems  Med Hx  Surg Hx  Fam Hx          Reviewed and updated as needed this visit by Provider   Tobacco  Allergies  Meds  Problems  Med Hx  Surg Hx  Fam Hx         Social History     Tobacco Use     Smoking status: Former Smoker     Quit date: 10/25/1975     Years since quittin.3     Smokeless tobacco: Former User     Quit date: 10/25/1998   Substance Use Topics     Alcohol use: Yes    " " Alcohol/week: 0.8 standard drinks     Comment: 1 weekly          Alcohol Use 2/17/2022   Prescreen: >3 drinks/day or >7 drinks/week? No           Current Outpatient Medications   Medication     aspirin (ASA) 81 MG tablet     hydrochlorothiazide (HYDRODIURIL) 12.5 MG tablet     lisinopril (ZESTRIL) 20 MG tablet     Multiple Vitamin (MULTI-VITAMINS) TABS     No current facility-administered medications for this visit.         Current providers sharing in care for this patient include:   Patient Care Team:  Sanjeev Burns MD as PCP - General (Internal Medicine)  Sanjeev Burns MD as Assigned PCP    The following health maintenance items are reviewed in Epic and correct as of today:  Health Maintenance Due   Topic Date Due     ZOSTER IMMUNIZATION (1 of 2) Never done     Lab work is in process          Review of Systems  Constitutional, HEENT, cardiovascular, pulmonary, GI, , musculoskeletal, neuro, skin, endocrine and psych systems are negative, except as otherwise noted.    OBJECTIVE:   /72   Pulse 50   Temp 97.4  F (36.3  C) (Tympanic)   Resp 18   Ht 1.79 m (5' 10.47\")   Wt 79.9 kg (176 lb 3.2 oz)   SpO2 97%   BMI 24.94 kg/m   Estimated body mass index is 24.94 kg/m  as calculated from the following:    Height as of this encounter: 1.79 m (5' 10.47\").    Weight as of this encounter: 79.9 kg (176 lb 3.2 oz).  Physical Exam  GENERAL: healthy, alert and no distress  EYES: Eyes grossly normal to inspection, PERRL and conjunctivae and sclerae normal  HENT: ear canals and TM's normal, nose and mouth without ulcers or lesions  NECK: no adenopathy, no asymmetry, masses, or scars and thyroid normal to palpation  RESP: lungs clear to auscultation - no rales, rhonchi or wheezes  CV: regular rate and rhythm, normal S1 S2, no S3 or S4, no murmur, click or rub, no peripheral edema and peripheral pulses strong  ABDOMEN: soft, nontender, no hepatosplenomegaly, no masses and bowel sounds normal  : Normal male, no " "hernia.  Prostate 1-2+, no nodularity  MS: no gross musculoskeletal defects noted, no edema  SKIN: no suspicious lesions or rashes  NEURO: Normal strength and tone, mentation intact and speech normal  PSYCH: mentation appears normal, affect normal/bright        ASSESSMENT / PLAN:       ICD-10-CM    1. Benign essential hypertension  I10 CT Coronary Calcium Scan     Comprehensive metabolic panel     Lipid Panel   2. Lower urinary tract symptoms (LUTS)  R39.9 Prostate Specific Antigen   3. Family history of colon cancer mom  62  Z80.0    4. H/O adenomatous polyp of colon  Z86.010    5. Disorder of prostate, unspecified   N42.9 Prostate Specific Antigen       Patient has been advised of split billing requirements and indicates understanding: Yes    COUNSELING:    Overall he appears to be doing well and is healthy.  Medications will continue without change.  Lab is pending, I will send him a letter with the results.  We will make arrangements for him to get the Shingrix vaccine.  Discussion regarding his heart and lungs, I think he would be a good candidate for CT calcium score so that is ordered.  If it is normal, he would not need to continue on aspirin therapy.  If he does have high calcium score he definitely should continue aspirin and we should probably consider statin therapy.  Colonoscopy will be due next year.  Assuming all goes well, he will follow-up on an annual basis.      Reviewed preventive health counseling, as reflected in patient instructions       Regular exercise       Healthy diet/nutrition    Estimated body mass index is 24.94 kg/m  as calculated from the following:    Height as of this encounter: 1.79 m (5' 10.47\").    Weight as of this encounter: 79.9 kg (176 lb 3.2 oz).        He reports that he quit smoking about 46 years ago. He quit smokeless tobacco use about 23 years ago.      Appropriate preventive services were discussed with this patient, including applicable screening as " appropriate for cardiovascular disease, diabetes, osteopenia/osteoporosis, and glaucoma.  As appropriate for age/gender, discussed screening for colorectal cancer, prostate cancer, breast cancer, and cervical cancer. Checklist reviewing preventive services available has been given to the patient.    Reviewed patients plan of care and provided an AVS. The Basic Care Plan (routine screening as documented in Health Maintenance) for Anjel meets the Care Plan requirement. This Care Plan has been established and reviewed with the Patient.    Counseling Resources:  ATP IV Guidelines  Pooled Cohorts Equation Calculator  Breast Cancer Risk Calculator  Breast Cancer: Medication to Reduce Risk  FRAX Risk Assessment  ICSI Preventive Guidelines  Dietary Guidelines for Americans, 2010  USDA's MyPlate  ASA Prophylaxis  Lung CA Screening    DARA GARVEY MD  [facility]    Identified Health Risks:

## 2022-02-17 NOTE — LETTER
February 17, 2022      Anjel Payton  [address] [place] [address]        Dear Anjel,    Below are the results of your recent labs:    Results for orders placed or performed in visit on 02/17/22   Prostate Specific Antigen     Status: Normal   Result Value Ref Range    PSA Tumor Marker 0.75 0.00 - 4.00 ug/L    Narrative    The DXI Access PSAS WHO assay is a two site immunoenzymatic   assay. Assay values obtained with different assay methods cannot be used   interchangeably due to differences in assay methods and reagent specificity.   Lipid Panel     Status: None   Result Value Ref Range    Cholesterol 156 <200 mg/dL    Triglycerides 78 <150 mg/dL    Direct Measure HDL 54 23 - 92 mg/dL    LDL Cholesterol Calculated 86 <=100 mg/dL    Non HDL Cholesterol 102 <130 mg/dL    Patient Fasting > 8hrs? Unknown     Narrative    Cholesterol  Desirable:  <200 mg/dL    Triglycerides  Normal:  Less than 150 mg/dL  Borderline High:  150-199 mg/dL  High:  200-499 mg/dL  Very High:  Greater than or equal to 500 mg/dL    Direct Measure HDL  Female:  Greater than or equal to 50 mg/dL   Male:  Greater than or equal to 40 mg/dL    LDL Cholesterol  Desirable:  <100mg/dL  Above Desirable:  100-129 mg/dL   Borderline High:  130-159 mg/dL   High:  160-189 mg/dL   Very High:  >= 190 mg/dL    Non HDL Cholesterol  Desirable:  130 mg/dL  Above Desirable:  130-159 mg/dL  Borderline High:  160-189 mg/dL  High:  190-219 mg/dL  Very High:  Greater than or equal to 220 mg/dL   Comprehensive metabolic panel     Status: Abnormal   Result Value Ref Range    Sodium 137 134 - 144 mmol/L    Potassium 4.0 3.5 - 5.1 mmol/L    Chloride 99 98 - 107 mmol/L    Carbon Dioxide (CO2) 32 (H) 21 - 31 mmol/L    Anion Gap 6 3 - 14 mmol/L    Urea Nitrogen 18 7 - 25 mg/dL    Creatinine 0.76 0.70 - 1.30 mg/dL    Calcium 10.1 8.6 - 10.3 mg/dL    Glucose 94 70 - 105 mg/dL    Alkaline Phosphatase 37 34 - 104 U/L    AST 31 13 - 39 U/L    ALT 20 7 - 52 U/L     Protein Total 7.4 6.4 - 8.9 g/dL    Albumin 4.9 3.5 - 5.7 g/dL    Bilirubin Total 0.7 0.3 - 1.0 mg/dL    GFR Estimate >90 >60 mL/min/1.73m2        Your PSA is normal as well.  Congratulations on this excellent report    Sincerely,        Sanjeev Burns MD  Internal Medicine  [facility]

## 2022-02-23 ENCOUNTER — HOSPITAL ENCOUNTER (OUTPATIENT)
Dept: CT IMAGING | Facility: OTHER | Age: 67
Discharge: HOME | End: 2022-02-23
Attending: INTERNAL MEDICINE | Admitting: INTERNAL MEDICINE

## 2022-02-23 DIAGNOSIS — I10 BENIGN ESSENTIAL HYPERTENSION: ICD-10-CM

## 2022-02-23 PROCEDURE — 75571 CT HRT W/O DYE W/CA TEST: CPT

## 2022-03-23 ENCOUNTER — ALLIED HEALTH/NURSE VISIT (OUTPATIENT)
Dept: FAMILY MEDICINE | Facility: OTHER | Age: 67
End: 2022-03-23
Attending: INTERNAL MEDICINE
Payer: MEDICARE

## 2022-03-23 DIAGNOSIS — Z23 NEED FOR ZOSTER VACCINATION: Primary | ICD-10-CM

## 2022-03-23 PROCEDURE — 90750 HZV VACC RECOMBINANT IM: CPT | Mod: GY

## 2022-03-23 NOTE — PROGRESS NOTES
Immunization Documentation - Shingrix #1  Verified patient's first and last name, and . Stated reason for visit today is to receive the Shingrix vaccine. Denied any concerns with previous immunizations. Allergies reviewed. VIS handout(s) reviewed and given to take home. Shingrix prepared and administered IM per standing order. Administration documented in IMMUNIZATIONS (see flowsheet and order for further information). Patient Instructed to wait in lobby for 15 minutes post-injection and notify staff immediately of any reaction.     Maya Crow RN ....................  3/23/2022   7:45 AM

## 2022-04-05 ENCOUNTER — OFFICE VISIT (OUTPATIENT)
Dept: INTERNAL MEDICINE | Facility: OTHER | Age: 67
End: 2022-04-05
Attending: INTERNAL MEDICINE
Payer: MEDICARE

## 2022-04-05 VITALS
TEMPERATURE: 97.3 F | HEART RATE: 61 BPM | OXYGEN SATURATION: 96 % | WEIGHT: 177 LBS | DIASTOLIC BLOOD PRESSURE: 72 MMHG | SYSTOLIC BLOOD PRESSURE: 116 MMHG | BODY MASS INDEX: 25.06 KG/M2 | RESPIRATION RATE: 16 BRPM

## 2022-04-05 DIAGNOSIS — R10.84 ABDOMINAL PAIN, GENERALIZED: Primary | ICD-10-CM

## 2022-04-05 LAB
CREAT SERPL-MCNC: 0.81 MG/DL (ref 0.7–1.3)
GFR SERPL CREATININE-BSD FRML MDRD: >90 ML/MIN/1.73M2

## 2022-04-05 PROCEDURE — G0463 HOSPITAL OUTPT CLINIC VISIT: HCPCS

## 2022-04-05 PROCEDURE — 82565 ASSAY OF CREATININE: CPT | Mod: ZL | Performed by: INTERNAL MEDICINE

## 2022-04-05 PROCEDURE — 99214 OFFICE O/P EST MOD 30 MIN: CPT | Performed by: INTERNAL MEDICINE

## 2022-04-05 PROCEDURE — 36415 COLL VENOUS BLD VENIPUNCTURE: CPT | Mod: ZL | Performed by: INTERNAL MEDICINE

## 2022-04-05 ASSESSMENT — ENCOUNTER SYMPTOMS
ENDOCRINE NEGATIVE: 1
ALLERGIC/IMMUNOLOGIC NEGATIVE: 1
CONSTITUTIONAL NEGATIVE: 1
HEMATOLOGIC/LYMPHATIC NEGATIVE: 1

## 2022-04-05 ASSESSMENT — PAIN SCALES - GENERAL: PAINLEVEL: NO PAIN (1)

## 2022-04-05 NOTE — PROGRESS NOTES
Chief Complaint:  Abdominal pain.    HPI: This patient is here today with a complaint of abdominal pain.  It is located just to the right of the umbilicus and sometimes will radiate superiorly, sometimes inferiorly.  Nothing really seems to bring it on.  It has been present for about 3 weeks.  He has not had any fever.  He has not had any weight loss.  Food does not seem to make any difference.  He does not have any urinary symptoms.  He does not have any GI symptoms.  Colonoscopy is up-to-date, he will be due for another colonoscopy in 1 year.    In discussing with the patient, he does admit that he is under some stress.  His daughter is currently being evaluated for kidney cancer and will be having her kidney removed at the [facility] next week.  This of course is concerning for him given the fact that he is experiencing this abdominal pain.    He is status post cholecystectomy.  He does have a previous history of ulcer disease due to H. pylori.  He has a previous history of an umbilical hernia repair.    Past Medical History:   Diagnosis Date     Acute gastric ulcer due to Helicobacter pylori      Essential (primary) hypertension     No Comments Provided     Lower urinary tract symptoms (LUTS)      Tubular adenoma        Past Surgical History:   Procedure Laterality Date     COLONOSCOPY      10/5/09     COLONOSCOPY N/A 7/16/2018    F/U  2023 tubular adenomas     ENDOSCOPY  04/29/2019    EGD antral ulcer     ESOPHAGOSCOPY, GASTROSCOPY, DUODENOSCOPY (EGD), COMBINED N/A 4/29/2019    Procedure: ESOPHAGOGASTRODUODENOSCOPY, WITH BIOPSY;  Surgeon: Jorge Mcclain MD;  Location: [facility] OR     HERNIA REPAIR, UMBILICAL  2009     LAPAROSCOPIC CHOLECYSTECTOMY  01/2017     RELEASE CARPAL TUNNEL Right        No Known Allergies    Current Outpatient Medications   Medication Sig Dispense Refill     hydrochlorothiazide (HYDRODIURIL) 12.5 MG tablet TAKE 1 TABLET BY MOUTH EVERY DAY 90 tablet 3     lisinopril (ZESTRIL) 20 MG tablet TAKE  1 TABLET BY MOUTH EVERY DAY 90 tablet 3     Multiple Vitamin (MULTI-VITAMINS) TABS Take 1 tablet by mouth daily         Social History     Socioeconomic History     Marital status:      Spouse name: Not on file     Number of children: Not on file     Years of education: Not on file     Highest education level: Not on file   Occupational History     Not on file   Tobacco Use     Smoking status: Former Smoker     Quit date: 10/25/1975     Years since quittin.4     Smokeless tobacco: Former User     Quit date: 10/25/1998   Substance and Sexual Activity     Alcohol use: Yes     Alcohol/week: 0.8 standard drinks     Comment: 1 weekly      Drug use: No     Comment: Drug use: No     Sexual activity: Yes     Partners: Female   Other Topics Concern     Parent/sibling w/ CABG, MI or angioplasty before 65F 55M? Not Asked   Social History Narrative    Vegetarian.  , retired from  the mines.  Rides bike daily.  Lives in [place].     Social Determinants of Health     Financial Resource Strain: Not on file   Food Insecurity: Not on file   Transportation Needs: Not on file   Physical Activity: Not on file   Stress: Not on file   Social Connections: Not on file   Intimate Partner Violence: Not on file   Housing Stability: Not on file       Review of Systems   Constitutional: Negative.    Endocrine: Negative.    Skin: Negative.    Allergic/Immunologic: Negative.    Hematological: Negative.        Physical Exam  Vitals and nursing note reviewed.   Constitutional:       General: He is not in acute distress.     Appearance: Normal appearance. He is not ill-appearing, toxic-appearing or diaphoretic.   Abdominal:      General: Abdomen is flat. Bowel sounds are normal. There is no distension.      Palpations: Abdomen is soft. There is no mass.      Tenderness: There is no abdominal tenderness. There is no right CVA tenderness, left CVA tenderness, guarding or rebound.      Hernia: No hernia is present.   Skin:      General: Skin is warm and dry.   Neurological:      Mental Status: He is alert. Mental status is at baseline.         Assessment:      ICD-10-CM    1. Abdominal pain, generalized  R10.84 CT Abdomen Pelvis w Contrast     Creatinine     Creatinine       Plan: This patient has a 3-week history of abdominal pain.  This is occurring on a daily basis.  Nothing that seems to be palliative or provocative.  Not associated with any other systemic symptoms and not associated with any change in bowel or bladder.  Etiology is unclear.  Certainly the worry of cancer is in the patient's mind given his daughter's history.  I do not see any evidence for hernia on exam.  This was reviewed with him.  Elected to proceed with CT scanning of the abdomen to rule out occult process.  I will let him know the results and we will proceed from there as indicated.

## 2022-04-05 NOTE — NURSING NOTE
"Chief Complaint   Patient presents with     Abdominal Pain       Initial /72   Pulse 61   Temp 97.3  F (36.3  C) (Tympanic)   Resp 16   Wt 80.3 kg (177 lb)   SpO2 96%   BMI 25.06 kg/m   Estimated body mass index is 25.06 kg/m  as calculated from the following:    Height as of 2/17/22: 1.79 m (5' 10.47\").    Weight as of this encounter: 80.3 kg (177 lb).  FOOD SECURITY SCREENING QUESTIONS  Hunger Vital Signs:  Within the past 12 months we worried whether our food would run out before we got money to buy more. Never  Within the past 12 months the food we bought just didn't last and we didn't have money to get more. Never        Ish Sarabia, LINN    "

## 2022-04-08 ENCOUNTER — HOSPITAL ENCOUNTER (OUTPATIENT)
Dept: CT IMAGING | Facility: OTHER | Age: 67
Discharge: HOME | End: 2022-04-08
Attending: INTERNAL MEDICINE | Admitting: INTERNAL MEDICINE
Payer: MEDICARE

## 2022-04-08 DIAGNOSIS — R10.84 ABDOMINAL PAIN, GENERALIZED: ICD-10-CM

## 2022-04-08 PROCEDURE — 250N000011 HC RX IP 250 OP 636: Performed by: INTERNAL MEDICINE

## 2022-04-08 PROCEDURE — 74177 CT ABD & PELVIS W/CONTRAST: CPT

## 2022-04-08 RX ORDER — IOPAMIDOL 755 MG/ML
100 INJECTION, SOLUTION INTRAVASCULAR ONCE
Status: COMPLETED | OUTPATIENT
Start: 2022-04-08 | End: 2022-04-08

## 2022-04-08 RX ADMIN — IOPAMIDOL 100 ML: 755 INJECTION, SOLUTION INTRAVENOUS at 16:41

## 2022-04-08 NOTE — LETTER
April 11, 2022        Anjel Payton  [address] [address]        Dear Anjel,    Your CT scan of the abdomen has returned and is normal.  This is very good news.  If you have continued abdominal pain or other problems, return for further evaluation.    Sincerely,        Sanjeev Burns MD  Internal Medicine  [facility]

## 2022-04-11 ENCOUNTER — MYC MEDICAL ADVICE (OUTPATIENT)
Dept: INTERNAL MEDICINE | Facility: OTHER | Age: 67
End: 2022-04-11
Payer: MEDICARE

## 2022-04-11 DIAGNOSIS — R39.9 LOWER URINARY TRACT SYMPTOMS (LUTS): Primary | ICD-10-CM

## 2022-04-18 ENCOUNTER — OFFICE VISIT (OUTPATIENT)
Dept: UROLOGY | Facility: OTHER | Age: 67
End: 2022-04-18
Attending: INTERNAL MEDICINE
Payer: MEDICARE

## 2022-04-18 VITALS
OXYGEN SATURATION: 98 % | SYSTOLIC BLOOD PRESSURE: 120 MMHG | BODY MASS INDEX: 25.51 KG/M2 | WEIGHT: 180.2 LBS | HEART RATE: 61 BPM | DIASTOLIC BLOOD PRESSURE: 70 MMHG | RESPIRATION RATE: 16 BRPM

## 2022-04-18 DIAGNOSIS — R31.0 GROSS HEMATURIA: Primary | ICD-10-CM

## 2022-04-18 DIAGNOSIS — R39.9 LOWER URINARY TRACT SYMPTOMS (LUTS): ICD-10-CM

## 2022-04-18 LAB
ALBUMIN UR-MCNC: NEGATIVE MG/DL
APPEARANCE UR: CLEAR
BILIRUB UR QL STRIP: NEGATIVE
COLOR UR AUTO: ABNORMAL
GLUCOSE UR STRIP-MCNC: NEGATIVE MG/DL
HGB UR QL STRIP: NEGATIVE
KETONES UR STRIP-MCNC: NEGATIVE MG/DL
LEUKOCYTE ESTERASE UR QL STRIP: ABNORMAL
MUCOUS THREADS #/AREA URNS LPF: PRESENT /LPF
NITRATE UR QL: NEGATIVE
PH UR STRIP: 5 [PH] (ref 5–9)
RBC URINE: <1 /HPF
SP GR UR STRIP: 1.02 (ref 1–1.03)
UROBILINOGEN UR STRIP-MCNC: NORMAL MG/DL
WBC URINE: 9 /HPF

## 2022-04-18 PROCEDURE — 87086 URINE CULTURE/COLONY COUNT: CPT | Mod: ZL | Performed by: UROLOGY

## 2022-04-18 PROCEDURE — 99204 OFFICE O/P NEW MOD 45 MIN: CPT | Mod: 25 | Performed by: UROLOGY

## 2022-04-18 PROCEDURE — 51798 US URINE CAPACITY MEASURE: CPT | Performed by: UROLOGY

## 2022-04-18 PROCEDURE — 81001 URINALYSIS AUTO W/SCOPE: CPT | Mod: ZL | Performed by: UROLOGY

## 2022-04-18 PROCEDURE — G0463 HOSPITAL OUTPT CLINIC VISIT: HCPCS

## 2022-04-18 PROCEDURE — 52000 CYSTOURETHROSCOPY: CPT | Performed by: UROLOGY

## 2022-04-18 PROCEDURE — G0463 HOSPITAL OUTPT CLINIC VISIT: HCPCS | Mod: 25

## 2022-04-18 PROCEDURE — 88112 CYTOPATH CELL ENHANCE TECH: CPT

## 2022-04-18 RX ORDER — SULFAMETHOXAZOLE/TRIMETHOPRIM 800-160 MG
1 TABLET ORAL 2 TIMES DAILY
Qty: 20 TABLET | Refills: 0 | Status: SHIPPED | OUTPATIENT
Start: 2022-04-18 | End: 2022-04-28

## 2022-04-18 ASSESSMENT — PAIN SCALES - GENERAL: PAINLEVEL: MODERATE PAIN (4)

## 2022-04-18 NOTE — PATIENT INSTRUCTIONS

## 2022-04-18 NOTE — NURSING NOTE
Chief Complaint   Patient presents with     Follow Up     Lower UTI symptoms     Patient presents to the clinic today for a follow up for lower urinary symptoms    Review of Systems:    Weight loss:    No     Recent fever/chills:  No   Night sweats:   No  Current skin rash:  No   Recent hair loss:  No  Heat intolerance:  No   Cold intolerance:  No  Chest pain:   No   Palpitations:   No  Shortness of breath:  No   Wheezing:   No  Constipation:    No   Diarrhea:   No   Nausea:   No   Vomiting:   No   Kidney/side pain:  No   Back pain:   No  Frequent headaches:  No   Dizziness:     No  Leg swelling:   No   Calf pain:    No    Post-Void Residual  A post-void residual was measured by ultrasonic bladder scanner.  61 mL  Tianna Daniels LPN  4/18/2022 2:36 PM    Medication Reconciliation: completed   Tianna Daniels LPN  4/18/2022 2:24 PM

## 2022-04-18 NOTE — PROGRESS NOTES
Type of Visit  NPV    Chief Complaint  Hematuria    HPI  Mr. Payton is a 66 year old male who presents with gross hematuria.  Gross hematuria started 1 month ago.  Episode lasted about 1-2 voids and then resolves spontaneously.  He has noticed this cycle repeated self twice in the last month.  Patient denies associated dysuria at the time of onset.  Patient denies clots associated with hematuria.    Hematuria-related signs/symptoms  History of smoking?    Yes - remote  History of chemotherapy?   No  History of pelvic radiation?   No  History of kidney or bladder stones?  No  History of frequent urinary tract infections? No      Past Medical History  He  has a past medical history of Acute gastric ulcer due to Helicobacter pylori, Essential (primary) hypertension, Lower urinary tract symptoms (LUTS), and Tubular adenoma.  Patient Active Problem List   Diagnosis     Lumbago     Vegetarian     Benign essential hypertension     Family history of colon cancer mom  62     H/O adenomatous polyp of colon     Lower urinary tract symptoms (LUTS)     Acute ulcer of  antrum     Gastroesophageal reflux disease with esophagitis     Acute gastric ulcer due to Helicobacter pylori       Past Surgical History  He  has a past surgical history that includes Colonoscopy; Release carpal tunnel (Right); Laparoscopic cholecystectomy (2017); Colonoscopy (N/A, 2018); hernia repair, umbilical (); endoscopy (2019); and Esophagoscopy, gastroscopy, duodenoscopy (EGD), combined (N/A, 2019).    Medications  He has a current medication list which includes the following prescription(s): hydrochlorothiazide, lisinopril, and multi-vitamins.    Allergies  No Known Allergies    Social History  He  reports that he quit smoking about 46 years ago. He quit smokeless tobacco use about 23 years ago. He reports current alcohol use of about 0.8 standard drinks of alcohol per week. He reports that he does not use drugs.  No drug  abuse.    Family History  Family History   Problem Relation Age of Onset     Colon Cancer Mother          age 60 or 61     Diabetes Father         Diabetes, post renal transplant secondary to diabetic complications,     Hypertension Father      Heart Disease Father          of myocardial infarction at age 57     Heart Disease Brother         With bicuspid aortic valve, status post aortic valve replacement     Breast Cancer Sister        Review of Systems  I personally reviewed the ROS with the patient.    Nursing Notes:   Tianna Daniels LPN  2022  2:38 PM  Addendum  Chief Complaint   Patient presents with     Follow Up     Lower UTI symptoms     Patient presents to the clinic today for a follow up for lower urinary symptoms    Review of Systems:    Weight loss:    No     Recent fever/chills:  No   Night sweats:   No  Current skin rash:  No   Recent hair loss:  No  Heat intolerance:  No   Cold intolerance:  No  Chest pain:   No   Palpitations:   No  Shortness of breath:  No   Wheezing:   No  Constipation:    No   Diarrhea:   No   Nausea:   No   Vomiting:   No   Kidney/side pain:  No   Back pain:   No  Frequent headaches:  No   Dizziness:     No  Leg swelling:   No   Calf pain:    No    Post-Void Residual  A post-void residual was measured by ultrasonic bladder scanner.  61 mL  Tianna Daniels LPN  2022 2:36 PM    Medication Reconciliation: completed   Tianna Daniels LPN  2022 2:24 PM       Physical Exam  Vitals:    22 1434   BP: 120/70   BP Location: Right arm   Patient Position: Sitting   Cuff Size: Adult Regular   Pulse: 61   Resp: 16   SpO2: 98%   Weight: 81.7 kg (180 lb 3.2 oz)     Constitutional: No acute distress.  Alert and cooperative   Head: NCAT  Eyes: Conjunctivae normal  Cardiovascular: Regular rate.  Pulmonary/Chest: Respirations are even and non-labored bilaterally, no audible wheezing  Abdominal: Soft. No distension, tenderness, masses or guarding.   Neurological: A + O x 3.   Cranial Nerves II-XII grossly intact.  Extremities: MARY x 4, Warm. No clubbing.  No cyanosis.    Skin: Pink, warm and dry.  No visible rashes noted.  Psychiatric:  Normal mood and affect  Back:  No left CVA tenderness.  No right CVA tenderness.  Genitourinary:  Nonpalpable bladder    ^^^^^^^^^^^^^^^^^^^^^^^^^^^^^^^^^^^^^^^^^^^^^^^^^^^^^^^^^^^^^^^^^^^^^^^^^^^^^^^^  Preprocedure diagnosis  Hematuria    Postprocedure diagnosis  Hematuria    Procedure  Flexible Cystourethroscopy    Surgeon  Brandon Rivera MD    Anesthesia  2% lidocaine jelly intraurethrally    Complications  None    Indications  66 year old male undergoing a flexible cystoscopy for the above mentioned indications.    Findings  Cystoscopic findings included a normal anterior urethra.    There was not a prominent median lobe.    The lateral lobes were mildly obstructive in appearance.  The bladder appeared to be normal capacity.    There were no tumors, stones or foreign bodies.    The orifices were slit-shaped and in their normal location.    Procedure  The patient was placed in supine position and prepped and draped in sterile fashion with lidocaine jelly per urethra for anesthesia.    I passed a lubricated 14F flexible cystoscope through the penile urethra and into the bladder and the bladder was completely visualized.  The cystoscope was retroflexed and the bladder neck and prostate visualized.    The cystoscope was slowly withdrawn while visualizing the urethra and the procedure terminated.    The patient tolerated the procedure well.      ^^^^^^^^^^^^^^^^^^^^^^^^^^^^^^^^^^^^^^^^^^^^^^^^^^^^^^^^^^^^^^^^^^^^^^^^^^^^^^^^    Labs   04/05/22 08:21   Creatinine 0.81   GFR Estimate >90 [1]      02/17/22 10:58   PSA 0.75      04/18/22 14:27   Color Urine Light Yellow   Appearance Urine Clear   Glucose Urine Negative   Bilirubin Urine Negative   Ketones Urine Negative   Specific Gravity Urine 1.020   PH Urine 5.0   Protein Albumin Urine Negative   Urobilinogen  mg/dL Normal   Nitrite Urine Negative   Blood Urine Negative   Leukocyte Esterase Urine Moderate !   WBC Urine 9 (H)   RBC Urine <1   Mucus Urine Present !     Imaging  CT a/p w/contrast  4/8/2022  I personally reviewed and interpreted the images and report.  IMPRESSION: No intraperitoneal masses or inflammatory changes are  Noted.    Assessment & Plan  Mr. Payton is a 66 year old male with gross hematuria who underwent cystoscopy to complete his work-up today.  Reviewed the past notes, labs and imaging to conduct this visit.    Discussed rationale for work up.  Discussed the specific indications for cross-sectional imaging as well as diagnostic cystoscopy.  Discussed potential findings of hematuria work up including, but not limited to, kidney stones, bladder tumors and/or kidney tumors.  Also discussed the potential for a normal work up.    The patient's urinalysis is not strongly suggestive of urinary tract infection given the relatively low level of pyuria.  The patient also does not have acute symptoms however cystoscopy today revealed multiple erythematous lesions throughout the bladder consistent with an inflammatory response to something like a UTI.  We did barbotage for urine and sent for cytology to rule out CIS.    Plan  Urine for cytology sent  Urine for urine culture sent  Bactrim DS x10 days  Follow-up cystoscopy in 4 to 6 weeks to confirm resolution of erythematous lesions

## 2022-04-18 NOTE — NURSING NOTE
Patient positioned in supine position, perineum area prepped with chlorhexidene Gluconate and patient draped per sterile technique. Per verbal order read back by Brandon Rivera MD, Urojet 10mL 2% lidocaine jelly to be instilled into urethra.  Urojet- 10ml 2% Lidocaine jelly instilled into the urethra.    Urojet 2%  Lot#: [id number]  Expiration date: 9/2023  : [employer]  NDC: [address]-3    [facility] Protocol    A. Pre-procedure verification complete Yes  1-relevant information / documentation available, reviewed and properly matched to the patient; 2-consent accurate and complete, 3-equipment and supplies available    B. Site marking complete N/A  Site marked if not in continuous attendance with patient    C. TIME OUT completed Yes  Time Out was conducted just prior to starting procedure to verify the eight required elements: 1-patient identity, 2-consent accurate and complete, 3-position, 4-correct side/site marked (if applicable), 5-procedure, 6-relevant images / results properly labeled and displayed (if applicable), 7-antibiotics / irrigation fluids (if applicable), 8-safety precautions.    After procedure perineum area rinsed. Discharge instructions reviewed with patient. Patient verbalized understanding of discharge instructions and discharged ambulatory.  Dolores Sotomayor LPN..................4/18/2022  2:49 PM

## 2022-04-20 LAB
BACTERIA UR CULT: NORMAL
PATH REPORT.FINAL DX SPEC: NORMAL

## 2022-05-18 ENCOUNTER — OFFICE VISIT (OUTPATIENT)
Dept: UROLOGY | Facility: OTHER | Age: 67
End: 2022-05-18
Attending: UROLOGY
Payer: MEDICARE

## 2022-05-18 VITALS
BODY MASS INDEX: 25.11 KG/M2 | TEMPERATURE: 97.8 F | OXYGEN SATURATION: 99 % | WEIGHT: 177.4 LBS | RESPIRATION RATE: 16 BRPM | HEART RATE: 66 BPM

## 2022-05-18 DIAGNOSIS — R31.0 GROSS HEMATURIA: Primary | ICD-10-CM

## 2022-05-18 PROCEDURE — G0463 HOSPITAL OUTPT CLINIC VISIT: HCPCS | Mod: 25

## 2022-05-18 PROCEDURE — 52000 CYSTOURETHROSCOPY: CPT | Performed by: UROLOGY

## 2022-05-18 ASSESSMENT — PAIN SCALES - GENERAL: PAINLEVEL: NO PAIN (0)

## 2022-05-18 NOTE — PATIENT INSTRUCTIONS

## 2022-05-18 NOTE — NURSING NOTE
Patient positioned in supine position, perineum area prepped with chlorhexidene Gluconate and patient draped per sterile technique. Per verbal order read back by Brandon Rivera MD, Urojet 10mL 2% lidocaine jelly to be instilled into urethra.  Urojet- 10ml 2% Lidocaine jelly instilled into the urethra.    Urojet 2%  Lot#: [id number]   Expiration date: 9/2023  : [employer]  NDC: [address]-1    [employer] Protocol    A. Pre-procedure verification complete Yes  1-relevant information / documentation available, reviewed and properly matched to the patient; 2-consent accurate and complete, 3-equipment and supplies available    B. Site marking complete N/A  Site marked if not in continuous attendance with patient    C. TIME OUT completed Yes  Time Out was conducted just prior to starting procedure to verify the eight required elements: 1-patient identity, 2-consent accurate and complete, 3-position, 4-correct side/site marked (if applicable), 5-procedure, 6-relevant images / results properly labeled and displayed (if applicable), 7-antibiotics / irrigation fluids (if applicable), 8-safety precautions.    After procedure perineum area rinsed. Discharge instructions reviewed with patient. Patient verbalized understanding of discharge instructions and discharged ambulatory.  Dolores Sotomayor LPN..................5/18/2022  7:58 AM

## 2022-05-18 NOTE — PROGRESS NOTES
Preprocedure diagnosis  Hematuria  Erythematous lesion of the bladder    Postprocedure diagnosis  Hematuria    Procedure  Flexible Cystourethroscopy    Surgeon  Brandon Rivera MD    Anesthesia  2% lidocaine jelly intraurethrally    Complications  None    Indications  66 year old male undergoing a flexible cystoscopy for the above mentioned indications.    Findings  Cystoscopic findings included a normal anterior urethra.    There was not a prominent median lobe.    The lateral lobes were not obstructive in appearance.  The bladder appeared to be normal capacity.    There were no tumors, stones or foreign bodies.    The previously identified erythematous lesion of the bladder has resolved completely.  The orifices were slit-shaped and in their normal location.    Procedure  The patient was placed in supine position and prepped and draped in sterile fashion with lidocaine jelly per urethra for anesthesia.    I passed a lubricated 14F flexible cystoscope through the penile urethra and into the bladder and the bladder was completely visualized.  The cystoscope was retroflexed and the bladder neck and prostate visualized.    The cystoscope was slowly withdrawn while visualizing the urethra and the procedure terminated.    The patient tolerated the procedure well.      Assessment & Plan  Mr. Payton is a 66 year old male who underwent cystoscopy today due to a prior erythematous lesion that was identified.  It appeared inflammatory in nature so we elected to follow-up with cystoscopies 4 to 6 weeks later.  Cystoscopy today does reveal that the erythematous lesion is resolved.

## 2022-06-27 ENCOUNTER — ALLIED HEALTH/NURSE VISIT (OUTPATIENT)
Dept: FAMILY MEDICINE | Facility: OTHER | Age: 67
End: 2022-06-27
Attending: INTERNAL MEDICINE
Payer: MEDICARE

## 2022-06-27 DIAGNOSIS — Z23 NEED FOR ZOSTER VACCINATION: Primary | ICD-10-CM

## 2022-06-27 PROCEDURE — 90750 HZV VACC RECOMBINANT IM: CPT

## 2022-06-27 NOTE — PROGRESS NOTES
Immunization Documentation - Shingrix #2  Verified patient's first and last name, and . Stated reason for visit today is to receive the Shingrix vaccine. Denied any concerns with previous immunizations. Allergies reviewed. VIS handout(s) reviewed and given to take home. Shingrix prepared and administered IM per standing order. Administration documented in IMMUNIZATIONS (see flowsheet and order for further information). Patient Instructed to wait in lobby for 15 minutes post-injection and notify staff immediately of any reaction.     Pt reported speaking to EMILE Masterson, prior to receiving Shingles vaccine. Pt stated he was informed his Secondary insurance would fully cover this injection when done in the clinic, that he did not have to go to the pharmacy.    Jodee Kwok RN ....................  2022   7:53 AM

## 2022-10-31 DIAGNOSIS — I10 BENIGN ESSENTIAL HYPERTENSION: ICD-10-CM

## 2022-10-31 NOTE — LETTER
November 1, 2022      Anjel Payton  [address] [facility] [address]        Dear Anjel,       This letter is to remind you that you are due for your annual exam with Sanjeev Burns. Your last comprehensive visit was more than 12 months ago.    Please call the clinic at [phone] to schedule your appointment.    Thank you for choosing [facility] and [facility] for your health care needs.    Sincerely,    Refazul WADE  [facility]

## 2022-11-01 RX ORDER — HYDROCHLOROTHIAZIDE 12.5 MG/1
TABLET ORAL
Qty: 90 TABLET | Refills: 3 | Status: SHIPPED | OUTPATIENT
Start: 2022-11-01 | End: 2024-02-12

## 2022-11-01 NOTE — TELEPHONE ENCOUNTER
[facility] sent Rx request for the following:    HYDROCHLOROTHIAZIDE 12.5 MG TB  Last Prescription Date:   10/14/21  Last Fill Qty/Refills:         90, R-3    Last Office Visit:              4/5/22   Future Office visit:           None   Due for annual exam, reminder letter sent.  Crystal Presley RN on 11/1/2022 at 10:32 AM

## 2022-12-01 DIAGNOSIS — I10 BENIGN ESSENTIAL HYPERTENSION: ICD-10-CM

## 2022-12-01 NOTE — TELEPHONE ENCOUNTER
[facility] sent Rx request for the following:      LISINOPRIL 20 MG TABLET      Last Prescription Date:   10/3/2021  Last Fill Qty/Refills:         90, R-3    Last Office Visit:              4/5/2022   Future Office visit:           none    Jaiden Brown RN, BSN  ....................  12/1/2022   1:58 PM

## 2022-12-05 RX ORDER — LISINOPRIL 20 MG/1
TABLET ORAL
Qty: 90 TABLET | Refills: 3 | Status: SHIPPED | OUTPATIENT
Start: 2022-12-05 | End: 2024-02-12

## 2022-12-13 DIAGNOSIS — M79.672 LEFT FOOT PAIN: Primary | ICD-10-CM

## 2022-12-13 DIAGNOSIS — M79.671 RIGHT FOOT PAIN: ICD-10-CM

## 2022-12-15 ENCOUNTER — HOSPITAL ENCOUNTER (OUTPATIENT)
Dept: GENERAL RADIOLOGY | Facility: OTHER | Age: 67
Discharge: HOME | End: 2022-12-15
Attending: PODIATRIST
Payer: MEDICARE

## 2022-12-15 ENCOUNTER — OFFICE VISIT (OUTPATIENT)
Dept: ORTHOPEDICS | Facility: OTHER | Age: 67
End: 2022-12-15
Attending: PODIATRIST
Payer: MEDICARE

## 2022-12-15 VITALS
HEART RATE: 58 BPM | WEIGHT: 177 LBS | SYSTOLIC BLOOD PRESSURE: 110 MMHG | DIASTOLIC BLOOD PRESSURE: 70 MMHG | RESPIRATION RATE: 16 BRPM | OXYGEN SATURATION: 95 % | BODY MASS INDEX: 25.06 KG/M2

## 2022-12-15 DIAGNOSIS — M79.671 RIGHT FOOT PAIN: Primary | ICD-10-CM

## 2022-12-15 DIAGNOSIS — M79.671 RIGHT FOOT PAIN: ICD-10-CM

## 2022-12-15 DIAGNOSIS — M79.672 LEFT FOOT PAIN: ICD-10-CM

## 2022-12-15 PROCEDURE — 73630 X-RAY EXAM OF FOOT: CPT | Mod: LT

## 2022-12-15 PROCEDURE — 99202 OFFICE O/P NEW SF 15 MIN: CPT | Performed by: PODIATRIST

## 2022-12-15 PROCEDURE — G0463 HOSPITAL OUTPT CLINIC VISIT: HCPCS

## 2022-12-15 NOTE — PROGRESS NOTES
Visit Date: 12/15/2022    Anjel is a 59-year-old gentleman, here to see me regarding primarily right foot pain.  He has third and fourth distal tip-of-toe pain, unknown of etiology.  He is a cycler.  No known injury.  Here to have this evaluated and discuss options.    PHYSICAL EXAMINATION:    INTEGUMENT:  Normal..  MUSCULOSKELETAL:  Right foot examined.  He has severe metadductus and bunion deformity.  He states this is asymptomatic.  He has dystrophic, third and fourth toenails that are thickened.  No overt tenderness on exam today.  No distal tip pain. No signs of pre-ulcerative lesions.  Sensation is intact.    IMAGING:  We did get 3 views of bilateral feet today.  Left foot has some hindfoot and TN arthrosis. Right foot shows severe metadductus and advanced bunion, associated arthritic changes of the first MPJ.  No overt changes to area of concern or of area of patient's pain.    ASSESSMENT:  Right foot metadductus, bunion, which he states this is asymptomatic. Distal tip of third and fourth toe pain, likely irritation from cycling shoes and rubbing.    PLAN:  I discussed condition and treatment.  The patient is asymptomatic today.  Certainly, there is pathology in both of his feet.  He does have some hindfoot, particularly TN arthrosis bilaterally, right slightly worse than left.  Right foot has severe metadductus, bunion.  He states it is asymptomatic.  Given that the third and fourth toe are his primary complaint today, is not overly symptomatic, he will see what happens here with ongoing symptoms, reappoint and consider options from there.    A 20-minute consultation.    Brandon Alvares DPM        D: 12/15/2022   T: 12/15/2022   MT: MARY ANNE    Name:     ANJEL BETHEA  MRN:      [id number]        Account:    [id number]   :      1955           Visit Date: 12/15/2022     Document: [id number]

## 2023-01-06 ENCOUNTER — HOSPITAL ENCOUNTER (OUTPATIENT)
Dept: GENERAL RADIOLOGY | Facility: OTHER | Age: 68
Discharge: HOME | End: 2023-01-06
Attending: FAMILY MEDICINE
Payer: COMMERCIAL

## 2023-01-06 ENCOUNTER — OFFICE VISIT (OUTPATIENT)
Dept: FAMILY MEDICINE | Facility: OTHER | Age: 68
End: 2023-01-06
Attending: FAMILY MEDICINE
Payer: COMMERCIAL

## 2023-01-06 VITALS
SYSTOLIC BLOOD PRESSURE: 138 MMHG | HEART RATE: 55 BPM | DIASTOLIC BLOOD PRESSURE: 76 MMHG | TEMPERATURE: 98.4 F | BODY MASS INDEX: 24.58 KG/M2 | RESPIRATION RATE: 18 BRPM | WEIGHT: 175.6 LBS | HEIGHT: 71 IN | OXYGEN SATURATION: 98 %

## 2023-01-06 DIAGNOSIS — R10.9 FLANK PAIN: Primary | ICD-10-CM

## 2023-01-06 DIAGNOSIS — R10.9 FLANK PAIN: ICD-10-CM

## 2023-01-06 LAB
ALBUMIN UR-MCNC: NEGATIVE MG/DL
ANION GAP SERPL CALCULATED.3IONS-SCNC: 13 MMOL/L (ref 7–15)
APPEARANCE UR: CLEAR
BILIRUB UR QL STRIP: NEGATIVE
BUN SERPL-MCNC: 19.5 MG/DL (ref 8–23)
CALCIUM SERPL-MCNC: 9.8 MG/DL (ref 8.8–10.2)
CHLORIDE SERPL-SCNC: 103 MMOL/L (ref 98–107)
COLOR UR AUTO: ABNORMAL
CREAT SERPL-MCNC: 0.67 MG/DL (ref 0.67–1.17)
DEPRECATED HCO3 PLAS-SCNC: 26 MMOL/L (ref 22–29)
GFR SERPL CREATININE-BSD FRML MDRD: >90 ML/MIN/1.73M2
GLUCOSE SERPL-MCNC: 98 MG/DL (ref 70–99)
GLUCOSE UR STRIP-MCNC: NEGATIVE MG/DL
HGB UR QL STRIP: NEGATIVE
HOLD SPECIMEN: NORMAL
KETONES UR STRIP-MCNC: 40 MG/DL
LEUKOCYTE ESTERASE UR QL STRIP: NEGATIVE
NITRATE UR QL: NEGATIVE
PH UR STRIP: 5 [PH] (ref 5–9)
POTASSIUM SERPL-SCNC: 5.5 MMOL/L (ref 3.4–5.3)
SODIUM SERPL-SCNC: 142 MMOL/L (ref 136–145)
SP GR UR STRIP: 1.01 (ref 1–1.03)
UROBILINOGEN UR STRIP-MCNC: NORMAL MG/DL

## 2023-01-06 PROCEDURE — 36415 COLL VENOUS BLD VENIPUNCTURE: CPT | Mod: ZL | Performed by: FAMILY MEDICINE

## 2023-01-06 PROCEDURE — 81003 URINALYSIS AUTO W/O SCOPE: CPT | Mod: ZL | Performed by: FAMILY MEDICINE

## 2023-01-06 PROCEDURE — 74019 RADEX ABDOMEN 2 VIEWS: CPT

## 2023-01-06 PROCEDURE — 80048 BASIC METABOLIC PNL TOTAL CA: CPT | Mod: ZL | Performed by: FAMILY MEDICINE

## 2023-01-06 PROCEDURE — G0463 HOSPITAL OUTPT CLINIC VISIT: HCPCS

## 2023-01-06 PROCEDURE — 99214 OFFICE O/P EST MOD 30 MIN: CPT | Performed by: FAMILY MEDICINE

## 2023-01-06 PROCEDURE — G0463 HOSPITAL OUTPT CLINIC VISIT: HCPCS | Mod: 25

## 2023-01-06 ASSESSMENT — PAIN SCALES - GENERAL: PAINLEVEL: MILD PAIN (3)

## 2023-01-06 ASSESSMENT — ENCOUNTER SYMPTOMS: BACK PAIN: 1

## 2023-01-06 NOTE — NURSING NOTE
Patient is here for back pain, is worried is from his kidney. Started Wednesday night.     Medication Reconciliation: complete      Carmen Rodriguez LPN 1/6/2023 2:49 PM       Advance care directive on file? no  Advance care directive provided to patient? no       Carmen Rodriguez LPN

## 2023-01-06 NOTE — PROGRESS NOTES
Assessment & Plan     Flank pain  Urinalysis was normal.  Abdominal x-ray was negative for obstruction, moderate stool was present.  No stones noted.  Pain most likely musculoskeletal based on tenderness to palpation in the area over the paraspinous muscles.  Recommend NSAIDs, ice, stretching.  Increase fiber in diet to help with constipation.  If no improvement can consider MiraLAX.  Follow-up if symptoms worsen.  - Basic Metabolic Panel; Future  - UA reflex to Microscopic and Culture; Future  - XR Abdomen 2 Views; Future  - UA reflex to Microscopic and Culture  - Basic Metabolic Panel                 No follow-ups on file.    Nel Ramires MD  [facility]    Subjective   Ajnel is a 67 year old, presenting for the following health issues:    Constant left flank pain x 3 days.  Worse with palpation and movement.  Denies hematuria, fever, chills or dysuria.  He did have an episode of gross hematuria in April.  He was found to have an erythematous lesion in his bladder.  Repeat cystoscopy revealed that had resolved.    Family history of renal cancer  Back Pain    Back Pain   Pertinent negatives include no fever, no abdominal pain and no dysuria.   History of Present Illness       Back Pain:  He presents for follow up of back pain. Patient's back pain is a new problem.    Original cause of back pain: not sure  First noticed back pain: in the last week  Patient feels back pain: constantlyLocation of back pain:  Left middle of back  Description of back pain: dull ache  Back pain spreads: nowhere    Since patient first noticed back pain, pain is: unchanged  Does back pain interfere with his job:  No  On a scale of 1-10 (10 being the worst), patient describes pain as:  4  What makes back pain worse: nothing  Acupuncture: not tried  Acetaminophen: not helpful  Activity or exercise: not helpful  Chiropractor:  Not tried  Cold: not tried  Heat: not tried  Massage: not tried  Muscle relaxants:  "not tried  NSAIDS: not helpful  Opioids: not tried  Physical Therapy: not tried  Rest: not helpful  Steroid Injection: not tried  Stretching: not helpful  Surgery: not tried  TENS unit: not tried  Topical pain relievers: not tried  Other healthcare providers patient is seeing for back pain: None    He eats 2-3 servings of fruits and vegetables daily.He consumes 0 sweetened beverage(s) daily.He exercises with enough effort to increase his heart rate 60 or more minutes per day.  He exercises with enough effort to increase his heart rate 5 days per week.   He is taking medications regularly.             Review of Systems   Constitutional: Negative for chills and fever.   Gastrointestinal: Negative for abdominal distention, abdominal pain, anal bleeding, constipation and hematochezia.   Genitourinary: Positive for flank pain. Negative for dysuria, enuresis, frequency and hematuria.   Musculoskeletal: Positive for back pain.            Objective    /76   Pulse 55   Temp 98.4  F (36.9  C) (Tympanic)   Resp 18   Ht 1.791 m (5' 10.5\")   Wt 79.7 kg (175 lb 9.6 oz)   SpO2 98%   BMI 24.84 kg/m    Body mass index is 24.84 kg/m .  Physical Exam  Musculoskeletal:      Lumbar back: Spasms and tenderness present. No swelling, deformity or bony tenderness. Normal range of motion. No scoliosis.        Back:       Comments: Area of tenderness.            Results for orders placed or performed during the hospital encounter of 01/06/23   XR Abdomen 2 Views     Status: None    Narrative    PROCEDURE:  XR ABDOMEN 2 VIEWS    HISTORY: Flank pain    TECHNIQUE:  AP upright and supine radiographs of the abdomen.    COMPARISON:  None.    FINDINGS:     The lung bases are clear. No subdiaphragmatic air is seen.    No dilated loops of small bowel or air-fluid levels are seen.    Moderate right colon stool burden. Osteopenia.      Impression    IMPRESSION:    No obstruction or free air.    CHARLIE QUINTERO MD         SYSTEM ID:  [id number] "   Results for orders placed or performed in visit on 01/06/23   UA reflex to Microscopic and Culture     Status: Abnormal    Specimen: Urine, Midstream   Result Value Ref Range    Color Urine Light Yellow Colorless, Straw, Light Yellow, Yellow    Appearance Urine Clear Clear    Glucose Urine Negative Negative mg/dL    Bilirubin Urine Negative Negative    Ketones Urine 40 (A) Negative mg/dL    Specific Gravity Urine 1.015 1.000 - 1.030    Blood Urine Negative Negative    pH Urine 5.0 5.0 - 9.0    Protein Albumin Urine Negative Negative mg/dL    Urobilinogen Urine Normal Normal, 2.0 mg/dL    Nitrite Urine Negative Negative    Leukocyte Esterase Urine Negative Negative    Narrative    Microscopic not indicated   Basic Metabolic Panel     Status: Abnormal   Result Value Ref Range    Sodium 142 136 - 145 mmol/L    Potassium 5.5 (H) 3.4 - 5.3 mmol/L    Chloride 103 98 - 107 mmol/L    Carbon Dioxide (CO2) 26 22 - 29 mmol/L    Anion Gap 13 7 - 15 mmol/L    Urea Nitrogen 19.5 8.0 - 23.0 mg/dL    Creatinine 0.67 0.67 - 1.17 mg/dL    Calcium 9.8 8.8 - 10.2 mg/dL    Glucose 98 70 - 99 mg/dL    GFR Estimate >90 >60 mL/min/1.73m2   Extra Tube     Status: None    Narrative    The following orders were created for panel order Extra Tube.  Procedure                               Abnormality         Status                     ---------                               -----------         ------                     Extra Purple Top Tube[[id number]]                            Final result                 Please view results for these tests on the individual orders.   Extra Purple Top Tube     Status: None   Result Value Ref Range    Hold Specimen [facility]

## 2023-01-12 ASSESSMENT — ENCOUNTER SYMPTOMS
HEMATURIA: 0
FEVER: 0
ABDOMINAL DISTENTION: 0
DYSURIA: 0
ABDOMINAL PAIN: 0
CONSTIPATION: 0
CHILLS: 0
HEMATOCHEZIA: 0
FREQUENCY: 0
FLANK PAIN: 1
ANAL BLEEDING: 0

## 2023-01-17 ENCOUNTER — OFFICE VISIT (OUTPATIENT)
Dept: INTERNAL MEDICINE | Facility: OTHER | Age: 68
End: 2023-01-17
Attending: INTERNAL MEDICINE
Payer: COMMERCIAL

## 2023-01-17 ENCOUNTER — HOSPITAL ENCOUNTER (OUTPATIENT)
Dept: GENERAL RADIOLOGY | Facility: OTHER | Age: 68
Discharge: HOME | End: 2023-01-17
Attending: INTERNAL MEDICINE
Payer: COMMERCIAL

## 2023-01-17 VITALS
BODY MASS INDEX: 25.41 KG/M2 | TEMPERATURE: 96.9 F | HEART RATE: 67 BPM | OXYGEN SATURATION: 99 % | DIASTOLIC BLOOD PRESSURE: 78 MMHG | WEIGHT: 179.6 LBS | RESPIRATION RATE: 16 BRPM | SYSTOLIC BLOOD PRESSURE: 136 MMHG

## 2023-01-17 DIAGNOSIS — R07.81 PLEURITIC CHEST PAIN: ICD-10-CM

## 2023-01-17 DIAGNOSIS — R07.81 PLEURITIC CHEST PAIN: Primary | ICD-10-CM

## 2023-01-17 PROCEDURE — G0463 HOSPITAL OUTPT CLINIC VISIT: HCPCS

## 2023-01-17 PROCEDURE — 99213 OFFICE O/P EST LOW 20 MIN: CPT | Performed by: INTERNAL MEDICINE

## 2023-01-17 PROCEDURE — 71046 X-RAY EXAM CHEST 2 VIEWS: CPT

## 2023-01-17 PROCEDURE — G0463 HOSPITAL OUTPT CLINIC VISIT: HCPCS | Mod: 25

## 2023-01-17 RX ORDER — MELOXICAM 15 MG/1
15 TABLET ORAL DAILY
Qty: 10 TABLET | Refills: 0 | Status: SHIPPED | OUTPATIENT
Start: 2023-01-17 | End: 2023-02-06

## 2023-01-17 ASSESSMENT — PAIN SCALES - GENERAL: PAINLEVEL: NO PAIN (1)

## 2023-01-17 ASSESSMENT — ENCOUNTER SYMPTOMS
GASTROINTESTINAL NEGATIVE: 1
CONSTITUTIONAL NEGATIVE: 1
BACK PAIN: 1

## 2023-01-17 NOTE — PROGRESS NOTES
ICD-10-CM    1. Pleuritic chest pain  R07.81 XR Chest 2 Views        His pain seems to be more of a pleuritic based pain today.  Exam is normal including no rash present to suggest zoster or other.  Chest x-ray is normal.  I am going to treat him for pleuritic based pain with Mobic daily for the next 10 days as needed.  Return if not improving.    X-ray is read suggesting possible infrahilar infiltrates.  He has nothing to suggest an infectious process at this time so I am not going to treat with antibiotics but he is of course expected to let me know should he have any fever, shortness of breath, or any acute respiratory symptoms.      Jimmie Hobbs is a 67 year old, presenting for the following health issues:  Back Pain      He is in today for follow-up on his left pain.  See previous note.  The pain is pleuritic in nature, he tells me that it does hurt when he takes a big breath and with some certain movements.  When he takes ibuprofen it does help a little bit but he has not been taking much.  No fever.  No cough.  No injury.  No rash.  His previous work-up was more for a flank area discomfort, today the pain is actually higher.    Back Pain     History of Present Illness       Back Pain:  He presents for follow up of back pain. Patient's back pain is a new problem.    Original cause of back pain: not sure  First noticed back pain: in the last week  Patient feels back pain: constantlyLocation of back pain:  Left middle of back  Description of back pain: dull ache  Back pain spreads: nowhere    Since patient first noticed back pain, pain is: unchanged  Does back pain interfere with his job:  No  On a scale of 1-10 (10 being the worst), patient describes pain as:  4  What makes back pain worse: nothing  Acupuncture: not tried  Acetaminophen: not helpful  Activity or exercise: not helpful  Chiropractor:  Not tried  Cold: not tried  Heat: not tried  Massage: not tried  Muscle relaxants: not tried  NSAIDS: not  helpful  Opioids: not tried  Physical Therapy: not tried  Rest: not helpful  Steroid Injection: not tried  Stretching: not helpful  Surgery: not tried  TENS unit: not tried  Topical pain relievers: not tried  Other healthcare providers patient is seeing for back pain: None    He eats 2-3 servings of fruits and vegetables daily.He consumes 0 sweetened beverage(s) daily.He exercises with enough effort to increase his heart rate 60 or more minutes per day.  He exercises with enough effort to increase his heart rate 5 days per week.   He is taking medications regularly.         Current Outpatient Medications   Medication     hydrochlorothiazide (HYDRODIURIL) 12.5 MG tablet     lisinopril (ZESTRIL) 20 MG tablet     Multiple Vitamin (MULTI-VITAMINS) TABS     No current facility-administered medications for this visit.         Review of Systems   Constitutional: Negative.    Gastrointestinal: Negative.    Musculoskeletal: Positive for back pain.            Objective    /78 (BP Location: Right arm, Patient Position: Sitting, Cuff Size: Adult Regular)   Pulse 67   Temp 96.9  F (36.1  C) (Tympanic)   Resp 16   Wt 81.5 kg (179 lb 9.6 oz)   SpO2 99%   BMI 25.41 kg/m    Body mass index is 25.41 kg/m .  Physical Exam  Vitals and nursing note reviewed.   Constitutional:       General: He is not in acute distress.     Appearance: Normal appearance. He is not ill-appearing, toxic-appearing or diaphoretic.   Pulmonary:      Effort: Pulmonary effort is normal.      Breath sounds: Normal breath sounds. No wheezing, rhonchi or rales.       Neurological:      Mental Status: He is alert.

## 2023-01-17 NOTE — NURSING NOTE
"Chief Complaint   Patient presents with     Back Pain       FOOD SECURITY SCREENING QUESTIONS  Hunger Vital Signs:  Within the past 12 months we worried whether our food would run out before we got money to buy more. Never  Within the past 12 months the food we bought just didn't last and we didn't have money to get more. Never  Nichelle Cowan LPN 1/17/2023 8:02 AM      Initial /78 (BP Location: Right arm, Patient Position: Sitting, Cuff Size: Adult Regular)   Pulse 67   Temp 96.9  F (36.1  C) (Tympanic)   Resp 16   Wt 81.5 kg (179 lb 9.6 oz)   SpO2 99%   BMI 25.41 kg/m   Estimated body mass index is 25.41 kg/m  as calculated from the following:    Height as of 1/6/23: 1.791 m (5' 10.5\").    Weight as of this encounter: 81.5 kg (179 lb 9.6 oz).  Medication Reconciliation: complete    Nichelle Cowan LPN  "

## 2023-02-06 ENCOUNTER — OFFICE VISIT (OUTPATIENT)
Dept: INTERNAL MEDICINE | Facility: OTHER | Age: 68
End: 2023-02-06
Attending: INTERNAL MEDICINE
Payer: COMMERCIAL

## 2023-02-06 VITALS
DIASTOLIC BLOOD PRESSURE: 72 MMHG | RESPIRATION RATE: 14 BRPM | SYSTOLIC BLOOD PRESSURE: 132 MMHG | TEMPERATURE: 97.2 F | BODY MASS INDEX: 25.15 KG/M2 | WEIGHT: 177.8 LBS | HEART RATE: 55 BPM | OXYGEN SATURATION: 100 %

## 2023-02-06 DIAGNOSIS — I10 BENIGN ESSENTIAL HYPERTENSION: Primary | ICD-10-CM

## 2023-02-06 LAB
ALBUMIN UR-MCNC: NEGATIVE MG/DL
APPEARANCE UR: CLEAR
BILIRUB UR QL STRIP: ABNORMAL
COLOR UR AUTO: YELLOW
GLUCOSE UR STRIP-MCNC: NEGATIVE MG/DL
HGB UR QL STRIP: NEGATIVE
HOLD SPECIMEN: NORMAL
KETONES UR STRIP-MCNC: 15 MG/DL
LEUKOCYTE ESTERASE UR QL STRIP: NEGATIVE
NITRATE UR QL: NEGATIVE
PH UR STRIP: 5.5 [PH] (ref 5–9)
POTASSIUM SERPL-SCNC: 3.9 MMOL/L (ref 3.4–5.3)
RBC URINE: <1 /HPF
SP GR UR STRIP: 1.02 (ref 1–1.03)
UROBILINOGEN UR STRIP-MCNC: NORMAL MG/DL
WBC URINE: <1 /HPF

## 2023-02-06 PROCEDURE — 99213 OFFICE O/P EST LOW 20 MIN: CPT | Performed by: INTERNAL MEDICINE

## 2023-02-06 PROCEDURE — 84132 ASSAY OF SERUM POTASSIUM: CPT | Mod: ZL | Performed by: INTERNAL MEDICINE

## 2023-02-06 PROCEDURE — 36415 COLL VENOUS BLD VENIPUNCTURE: CPT | Mod: ZL | Performed by: INTERNAL MEDICINE

## 2023-02-06 PROCEDURE — G0463 HOSPITAL OUTPT CLINIC VISIT: HCPCS

## 2023-02-06 PROCEDURE — 81001 URINALYSIS AUTO W/SCOPE: CPT | Mod: ZL | Performed by: INTERNAL MEDICINE

## 2023-02-06 ASSESSMENT — PAIN SCALES - GENERAL: PAINLEVEL: NO PAIN (0)

## 2023-02-06 ASSESSMENT — ENCOUNTER SYMPTOMS
RESPIRATORY NEGATIVE: 1
CARDIOVASCULAR NEGATIVE: 1
CONSTITUTIONAL NEGATIVE: 1

## 2023-02-06 NOTE — NURSING NOTE
"Chief Complaint   Patient presents with     Follow Up     Back pain/side pain     Stomach pain     Mild/moderate - below belly button, requesting colonoscopy, wants to discuss urine ketones and potassium          Initial /72 (BP Location: Right arm, Patient Position: Sitting, Cuff Size: Adult Regular)   Pulse 55   Temp 97.2  F (36.2  C) (Temporal)   Resp 14   Wt 80.6 kg (177 lb 12.8 oz)   SpO2 100%   BMI 25.15 kg/m   Estimated body mass index is 25.15 kg/m  as calculated from the following:    Height as of 1/6/23: 1.791 m (5' 10.5\").    Weight as of this encounter: 80.6 kg (177 lb 12.8 oz).       Medication Reconciliation: Complete    Rukhsana Montenegro LPN .......  2/6/2023  8:07 AM   "

## 2023-02-06 NOTE — PROGRESS NOTES
ICD-10-CM    1. Benign essential hypertension  I10 Potassium     UA with Microscopic reflex to Culture        We will recheck his urine and his potassium today and I will message him the results.  Medicare wellness visit in 1 month, we will plan on colonoscopy following that.      Jimmie Hobbs is a 67 year old, presenting for the following health issues:  Follow Up (Back pain/side pain) and Stomach pain (Mild/moderate - below belly button, requesting colonoscopy, wants to discuss urine ketones and potassium )      This patient comes in today concerned about his previous testing.  2 months ago when he was in he had a urine test that was positive for ketones.  His potassium was also slightly elevated.  He is concerned about these results because she looked them up on the Internet.  He currently feels fine.  His pleuritic chest pain has resolved completely.  No other complaints or concerns.    History of Present Illness       Back Pain:  He presents for follow up of back pain. Patient's back pain is a recurring problem.  Location of back pain:  Left middle of back  Description of back pain: dull ache  Back pain spreads: nowhere    Since patient first noticed back pain, pain is: no longer a problem  Does back pain interfere with his job:  No      He eats 2-3 servings of fruits and vegetables daily.He consumes 0 sweetened beverage(s) daily.He exercises with enough effort to increase his heart rate 60 or more minutes per day.  He exercises with enough effort to increase his heart rate 5 days per week.   He is taking medications regularly.         Current Outpatient Medications   Medication     hydrochlorothiazide (HYDRODIURIL) 12.5 MG tablet     lisinopril (ZESTRIL) 20 MG tablet     Multiple Vitamin (MULTI-VITAMINS) TABS     No current facility-administered medications for this visit.         Review of Systems   Constitutional: Negative.    Respiratory: Negative.    Cardiovascular: Negative.             Objective     /72 (BP Location: Right arm, Patient Position: Sitting, Cuff Size: Adult Regular)   Pulse 55   Temp 97.2  F (36.2  C) (Temporal)   Resp 14   Wt 80.6 kg (177 lb 12.8 oz)   SpO2 100%   BMI 25.15 kg/m    Body mass index is 25.15 kg/m .  Physical Exam  Vitals and nursing note reviewed.   Constitutional:       General: He is not in acute distress.     Appearance: Normal appearance. He is not ill-appearing, toxic-appearing or diaphoretic.   Neurological:      Mental Status: He is alert.

## 2023-02-20 DIAGNOSIS — Z86.0101 H/O ADENOMATOUS POLYP OF COLON: Primary | ICD-10-CM

## 2023-02-23 DIAGNOSIS — Z12.11 ENCOUNTER FOR SCREENING COLONOSCOPY: Primary | ICD-10-CM

## 2023-02-23 RX ORDER — POLYETHYLENE GLYCOL 3350, SODIUM CHLORIDE, SODIUM BICARBONATE, POTASSIUM CHLORIDE 420; 11.2; 5.72; 1.48 G/4L; G/4L; G/4L; G/4L
4000 POWDER, FOR SOLUTION ORAL ONCE
Qty: 4000 ML | Refills: 0 | Status: SHIPPED | OUTPATIENT
Start: 2023-07-12 | End: 2023-07-12

## 2023-02-23 RX ORDER — BISACODYL 5 MG/1
TABLET, DELAYED RELEASE ORAL
Qty: 2 TABLET | Refills: 0 | Status: ON HOLD | OUTPATIENT
Start: 2023-07-12 | End: 2023-07-19

## 2023-02-23 NOTE — TELEPHONE ENCOUNTER
Screening Questions for the Scheduling of Screening Colonoscopies   (If Colonoscopy is diagnostic, Provider should review the chart before scheduling.)  Are you younger than 50 or older than 80?  no  Do you take aspirin or fish oil?  no (if yes, tell patient to stop 1 week prior to Colonoscopy)  Do you take warfarin (Coumadin), clopidogrel (Plavix), apixaban (Eliquis), dabigatram (Pradaxa), rivaroxaban (Xarelto) or any blood thinner? no  Do you use oxygen at home?  no  Do you have kidney disease? no  Are you on dialysis? no  Have you had a stroke or heart attack in the last year? no  Have you had a stent in your heart or any blood vessel in the last year? no  Have you had a transplant of any organ? no  Have you had a colonoscopy or upper endoscopy (EGD) before? yes         When?  5yrs  Date of scheduled Colonoscopy. 7/19/23  Provider [facility]

## 2023-03-20 ENCOUNTER — OFFICE VISIT (OUTPATIENT)
Dept: INTERNAL MEDICINE | Facility: OTHER | Age: 68
End: 2023-03-20
Attending: INTERNAL MEDICINE
Payer: COMMERCIAL

## 2023-03-20 VITALS
SYSTOLIC BLOOD PRESSURE: 126 MMHG | WEIGHT: 175 LBS | TEMPERATURE: 97.1 F | BODY MASS INDEX: 24.5 KG/M2 | OXYGEN SATURATION: 98 % | RESPIRATION RATE: 18 BRPM | HEART RATE: 60 BPM | DIASTOLIC BLOOD PRESSURE: 70 MMHG | HEIGHT: 71 IN

## 2023-03-20 DIAGNOSIS — K21.00 GASTROESOPHAGEAL REFLUX DISEASE WITH ESOPHAGITIS WITHOUT HEMORRHAGE: ICD-10-CM

## 2023-03-20 DIAGNOSIS — N42.9 DISORDER OF PROSTATE, UNSPECIFIED: ICD-10-CM

## 2023-03-20 DIAGNOSIS — R39.9 LOWER URINARY TRACT SYMPTOMS (LUTS): ICD-10-CM

## 2023-03-20 DIAGNOSIS — S46.812A STRAIN OF LEFT TRAPEZIUS MUSCLE, INITIAL ENCOUNTER: ICD-10-CM

## 2023-03-20 DIAGNOSIS — I10 BENIGN ESSENTIAL HYPERTENSION: Primary | ICD-10-CM

## 2023-03-20 DIAGNOSIS — Z86.0101 H/O ADENOMATOUS POLYP OF COLON: ICD-10-CM

## 2023-03-20 LAB
ALBUMIN SERPL BCG-MCNC: 4.5 G/DL (ref 3.5–5.2)
ALP SERPL-CCNC: 51 U/L (ref 40–129)
ALT SERPL W P-5'-P-CCNC: 27 U/L (ref 10–50)
ANION GAP SERPL CALCULATED.3IONS-SCNC: 12 MMOL/L (ref 7–15)
AST SERPL W P-5'-P-CCNC: 35 U/L (ref 10–50)
BILIRUB SERPL-MCNC: 1 MG/DL
BUN SERPL-MCNC: 24 MG/DL (ref 8–23)
CALCIUM SERPL-MCNC: 9.5 MG/DL (ref 8.8–10.2)
CHLORIDE SERPL-SCNC: 97 MMOL/L (ref 98–107)
CHOLEST SERPL-MCNC: 168 MG/DL
CREAT SERPL-MCNC: 0.8 MG/DL (ref 0.67–1.17)
DEPRECATED HCO3 PLAS-SCNC: 28 MMOL/L (ref 22–29)
GFR SERPL CREATININE-BSD FRML MDRD: >90 ML/MIN/1.73M2
GLUCOSE SERPL-MCNC: 97 MG/DL (ref 70–99)
HDLC SERPL-MCNC: 50 MG/DL
HOLD SPECIMEN: NORMAL
LDLC SERPL CALC-MCNC: 110 MG/DL
NONHDLC SERPL-MCNC: 118 MG/DL
POTASSIUM SERPL-SCNC: 3.9 MMOL/L (ref 3.4–5.3)
PROT SERPL-MCNC: 8.1 G/DL (ref 6.4–8.3)
PSA SERPL DL<=0.01 NG/ML-MCNC: 1.49 NG/ML (ref 0–4.5)
SODIUM SERPL-SCNC: 137 MMOL/L (ref 136–145)
TRIGL SERPL-MCNC: 42 MG/DL

## 2023-03-20 PROCEDURE — 36415 COLL VENOUS BLD VENIPUNCTURE: CPT | Mod: ZL | Performed by: INTERNAL MEDICINE

## 2023-03-20 PROCEDURE — 80061 LIPID PANEL: CPT | Mod: ZL | Performed by: INTERNAL MEDICINE

## 2023-03-20 PROCEDURE — 80053 COMPREHEN METABOLIC PANEL: CPT | Mod: ZL | Performed by: INTERNAL MEDICINE

## 2023-03-20 PROCEDURE — 84153 ASSAY OF PSA TOTAL: CPT | Mod: ZL | Performed by: INTERNAL MEDICINE

## 2023-03-20 PROCEDURE — G0439 PPPS, SUBSEQ VISIT: HCPCS | Performed by: INTERNAL MEDICINE

## 2023-03-20 PROCEDURE — G0463 HOSPITAL OUTPT CLINIC VISIT: HCPCS

## 2023-03-20 ASSESSMENT — ENCOUNTER SYMPTOMS
ABDOMINAL PAIN: 0
PALPITATIONS: 0
PARESTHESIAS: 0
FEVER: 0
SHORTNESS OF BREATH: 0
HEADACHES: 0
HEARTBURN: 0
CHILLS: 0
HEMATURIA: 0
CONSTIPATION: 0
JOINT SWELLING: 0
SORE THROAT: 0
COUGH: 0
FREQUENCY: 0
EYE PAIN: 0
HEMATOCHEZIA: 0
DIARRHEA: 0
DIZZINESS: 0
ARTHRALGIAS: 0
NAUSEA: 0
MYALGIAS: 1
WEAKNESS: 0
NERVOUS/ANXIOUS: 0
DYSURIA: 0

## 2023-03-20 ASSESSMENT — PAIN SCALES - GENERAL: PAINLEVEL: SEVERE PAIN (6)

## 2023-03-20 ASSESSMENT — ACTIVITIES OF DAILY LIVING (ADL): CURRENT_FUNCTION: NO ASSISTANCE NEEDED

## 2023-03-20 NOTE — LETTER
March 20, 2023      Anjel Payton  [address] [id number]        Dear Anjel,    Below are the results of your recent labs:    Results for orders placed or performed in visit on 03/20/23   Prostate Specific Antigen     Status: Normal   Result Value Ref Range    PSA Tumor Marker 1.49 0.00 - 4.50 ng/mL    Narrative    This result is obtained using the Roche Elecsys total PSA method on the luca e411 immunoassay analyzer. Results obtained with different assay methods or kits cannot be used interchangeably.   Lipid Panel     Status: Abnormal   Result Value Ref Range    Cholesterol 168 <200 mg/dL    Triglycerides 42 <150 mg/dL    Direct Measure HDL 50 >=40 mg/dL    LDL Cholesterol Calculated 110 (H) <=100 mg/dL    Non HDL Cholesterol 118 <130 mg/dL    Narrative    Cholesterol  Desirable:  <200 mg/dL    Triglycerides  Normal:  Less than 150 mg/dL  Borderline High:  150-199 mg/dL  High:  200-499 mg/dL  Very High:  Greater than or equal to 500 mg/dL    Direct Measure HDL  Female:  Greater than or equal to 50 mg/dL   Male:  Greater than or equal to 40 mg/dL    LDL Cholesterol  Desirable:  <100mg/dL  Above Desirable:  100-129 mg/dL   Borderline High:  130-159 mg/dL   High:  160-189 mg/dL   Very High:  >= 190 mg/dL    Non HDL Cholesterol  Desirable:  130 mg/dL  Above Desirable:  130-159 mg/dL  Borderline High:  160-189 mg/dL  High:  190-219 mg/dL  Very High:  Greater than or equal to 220 mg/dL   Comprehensive metabolic panel     Status: Abnormal   Result Value Ref Range    Sodium 137 136 - 145 mmol/L    Potassium 3.9 3.4 - 5.3 mmol/L    Chloride 97 (L) 98 - 107 mmol/L    Carbon Dioxide (CO2) 28 22 - 29 mmol/L    Anion Gap 12 7 - 15 mmol/L    Urea Nitrogen 24.0 (H) 8.0 - 23.0 mg/dL    Creatinine 0.80 0.67 - 1.17 mg/dL    Calcium 9.5 8.8 - 10.2 mg/dL    Glucose 97 70 - 99 mg/dL    Alkaline Phosphatase 51 40 - 129 U/L    AST 35 10 - 50 U/L    ALT 27 10 - 50 U/L    Protein Total 8.1 6.4 - 8.3 g/dL    Albumin 4.5  3.5 - 5.2 g/dL    Bilirubin Total 1.0 <=1.2 mg/dL    GFR Estimate >90 >60 mL/min/1.73m2   Extra Tube     Status: None    Narrative    The following orders were created for panel order Extra Tube.  Procedure                               Abnormality         Status                     ---------                               -----------         ------                     Extra Serum Separator Tu...[[id number]]                      Final result                 Please view results for these tests on the individual orders.   Extra Serum Separator Tube (SST)     Status: None   Result Value Ref Range    Hold Specimen JIC         Your blood tests look fine.  The chloride labeled one-point low and the urea nitrogen being labeled one-point high are not significant or important.  Congratulations on this very good report and keep up the good work    Sincerely,        Sanjeev Burns MD  Internal Medicine  [facility]

## 2023-03-20 NOTE — PATIENT INSTRUCTIONS
Continue your current medications without change.    Complete blood tests today, I will send you a MyChart letter with the results.    You will have your colonoscopy in July.    Return if there are problems.

## 2023-03-20 NOTE — PROGRESS NOTES
SUBJECTIVE:   Anjel is a 67 year old who presents for Preventive Visit.  Patient has been advised of split billing requirements and indicates understanding: Yes  Are you in the first 12 months of your Medicare coverage?  No    HPI     He is here today for Medicare wellness visit.  In most respects he feels fine.  He does have a history of hypertension and is on 2 drug therapy with good control of his blood pressure.  No endorgan disease.  He has some lower urinary tract symptoms which are not terribly bothersome but he would like to have a PSA done.  He has a history of adenomatous colonic polyps and his mother had colon cancer, he is scheduled for colonoscopy in July which will be a 5-year follow-up.    He has some occasional reflux symptoms which are not terribly problematic.  He has been having some pain in his left upper back with some radiation of discomfort to his left arm.  When he takes ibuprofen it seems to get better.  He tries to limit the amount of ibuprofen because of his stomach issues and concerns.    Medications are reconciled.  Past medical history, past surgical history, family history and social histories are reviewed and updated.    Have you ever done Advance Care Planning? (For example, a Health Directive, POLST, or a discussion with a medical provider or your loved ones about your wishes): Yes, advance care planning is on file.    Fall risk  Fallen 2 or more times in the past year?: No  Any fall with injury in the past year?: No    Cognitive Screening   1) Repeat 3 items (Leader, Season, Table)    2) Clock draw: NORMAL  3) 3 item recall: Recalls 3 objects  Results: 3 items recalled: COGNITIVE IMPAIRMENT LESS LIKELY    Mini-CogTM Copyright SUPRIYA Hearn. Licensed by the author for use in [facility]; reprinted with permission (dima@.[facility]). All rights reserved.      Do you have sleep apnea, excessive snoring or daytime drowsiness?: no    Reviewed and updated as needed this visit by  clinical staff   Tobacco  Allergies  Meds  Problems  Med Hx  Surg Hx  Fam Hx          Reviewed and updated as needed this visit by Provider   Tobacco  Allergies  Meds  Problems  Med Hx  Surg Hx  Fam Hx         Social History     Tobacco Use     Smoking status: Former     Types: Cigarettes     Quit date: 10/25/1975     Years since quittin.4     Smokeless tobacco: Former     Quit date: 10/25/1998   Substance Use Topics     Alcohol use: Yes     Alcohol/week: 0.8 standard drinks     Comment: 1 weekly          Alcohol Use 3/20/2023   Prescreen: >3 drinks/day or >7 drinks/week? No   Review current opioid prescriptions    For a patient with a current opioid prescription:    Reviewed potential Opioid Use Disorder (OUD) risk factors: No     Evaluate their pain severity and current treatment plan:     Provide information on non-opioid treatment options:    Refer to a specialist, as appropriate:    Get more information on pain management in the [facility] Pain Management Best Practices Inter-agency Task Force Report    Screen for potential Substance Use Disorders (SUDs)    Reviewed the patient s potential risk factors for SUDs: No     Refer to treatment or specialist, as appropriate:     A screening tool isn t required but you may use one:  Find more information in the [place] on Drug Abuse Screening and Assessment Tools Chart    Current providers sharing in care for this patient include:   Patient Care Team:  Sanjeev Burns MD as PCP - General (Internal Medicine)  Sanjeev Burns MD as Assigned PCP  Brandon Rivera MD as Assigned Surgical Provider  Brandon Alvares DPM as Assigned Musculoskeletal Provider    The following health maintenance items are reviewed in Epic and correct as of today:  Health Maintenance   Topic Date Due     COVID-19 Vaccine (4 - Booster for Moderna series) 01/10/2022     COLORECTAL CANCER SCREENING  2023     MEDICARE ANNUAL WELLNESS VISIT  2024     FALL RISK  "ASSESSMENT  03/20/2024     LIPID  02/17/2027     ADVANCE CARE PLANNING  03/20/2028     DTAP/TDAP/TD IMMUNIZATION (3 - Td or Tdap) 09/15/2030     HEPATITIS C SCREENING  Completed     PHQ-2 (once per calendar year)  Completed     Pneumococcal Vaccine: 65+ Years  Completed     ZOSTER IMMUNIZATION  Completed     AORTIC ANEURYSM SCREENING (SYSTEM ASSIGNED)  Completed     IPV IMMUNIZATION  Aged Out     MENINGITIS IMMUNIZATION  Aged Out     INFLUENZA VACCINE  Discontinued     Lab work is in process          Review of Systems  Constitutional, HEENT, cardiovascular, pulmonary, GI, , musculoskeletal, neuro, skin, endocrine and psych systems are negative, except as otherwise noted.    OBJECTIVE:   /70   Pulse 60   Temp 97.1  F (36.2  C) (Temporal)   Resp 18   Ht 1.791 m (5' 10.5\")   Wt 79.4 kg (175 lb)   SpO2 98%   BMI 24.75 kg/m   Estimated body mass index is 24.75 kg/m  as calculated from the following:    Height as of this encounter: 1.791 m (5' 10.5\").    Weight as of this encounter: 79.4 kg (175 lb).  Physical Exam  GENERAL: healthy, alert and no distress  EYES: Eyes grossly normal to inspection, PERRL and conjunctivae and sclerae normal  HENT: ear canals and TM's normal, nose and mouth without ulcers or lesions  NECK: no adenopathy, no asymmetry, masses, or scars and thyroid normal to palpation  RESP: lungs clear to auscultation - no rales, rhonchi or wheezes  CV: regular rate and rhythm, normal S1 S2, no S3 or S4, no murmur, click or rub, no peripheral edema and peripheral pulses strong  ABDOMEN: soft, nontender, no hepatosplenomegaly, no masses and bowel sounds normal  : Normal male, no hernia.  Prostate normal  MS: no gross musculoskeletal defects noted, no edema.  He has pain just over the left scapular area with palpation  SKIN: no suspicious lesions or rashes  NEURO: Normal strength and tone, mentation intact and speech normal  PSYCH: mentation appears normal, affect normal/bright        ASSESSMENT " / PLAN:       ICD-10-CM    1. Benign essential hypertension  I10 Comprehensive metabolic panel     Lipid Panel      2. Lower urinary tract symptoms (LUTS)  R39.9 Prostate Specific Antigen      3. H/O adenomatous polyp of colon  Z86.010       4. Gastroesophageal reflux disease with esophagitis without hemorrhage  K21.00       5. Disorder of prostate, unspecified  N42.9 Prostate Specific Antigen      6. Strain of left trapezius muscle, initial encounter  S46.365U           Patient has been advised of split billing requirements and indicates understanding: Yes      COUNSELING:    He appears to be doing very well at this time.  Medications will continue without change.  Reassured regarding the muscular pain, I think this will improve with time.  Follow-up if it does not.  Complete lab drawn and pending, I will send him a letter with the results.    Reviewed preventive health counseling, as reflected in patient instructions       Regular exercise       Healthy diet/nutrition        He reports that he quit smoking about 47 years ago. His smoking use included cigarettes. He quit smokeless tobacco use about 24 years ago.      Appropriate preventive services were discussed with this patient, including applicable screening as appropriate for cardiovascular disease, diabetes, osteopenia/osteoporosis, and glaucoma.  As appropriate for age/gender, discussed screening for colorectal cancer, prostate cancer, breast cancer, and cervical cancer. Checklist reviewing preventive services available has been given to the patient.    Reviewed patients plan of care and provided an AVS. The Basic Care Plan (routine screening as documented in Health Maintenance) for Anjel meets the Care Plan requirement. This Care Plan has been established and reviewed with the Patient.      DARA GARVEY MD  [facility]    Identified Health Risks:

## 2023-04-10 ENCOUNTER — NURSE TRIAGE (OUTPATIENT)
Dept: INTERNAL MEDICINE | Facility: OTHER | Age: 68
End: 2023-04-10
Payer: COMMERCIAL

## 2023-04-10 ENCOUNTER — OFFICE VISIT (OUTPATIENT)
Dept: PEDIATRICS | Facility: OTHER | Age: 68
End: 2023-04-10
Attending: INTERNAL MEDICINE
Payer: COMMERCIAL

## 2023-04-10 VITALS
BODY MASS INDEX: 25.41 KG/M2 | SYSTOLIC BLOOD PRESSURE: 136 MMHG | RESPIRATION RATE: 20 BRPM | TEMPERATURE: 98.2 F | HEIGHT: 71 IN | DIASTOLIC BLOOD PRESSURE: 84 MMHG | HEART RATE: 57 BPM | OXYGEN SATURATION: 98 % | WEIGHT: 181.5 LBS

## 2023-04-10 DIAGNOSIS — S29.012A UPPER BACK STRAIN, INITIAL ENCOUNTER: Primary | ICD-10-CM

## 2023-04-10 PROCEDURE — 99213 OFFICE O/P EST LOW 20 MIN: CPT | Performed by: INTERNAL MEDICINE

## 2023-04-10 PROCEDURE — G0463 HOSPITAL OUTPT CLINIC VISIT: HCPCS

## 2023-04-10 ASSESSMENT — PAIN SCALES - GENERAL: PAINLEVEL: MODERATE PAIN (4)

## 2023-04-10 NOTE — NURSING NOTE
"Chief Complaint   Patient presents with     Back Pain     Radiates to left shoulder and down left arm         Initial /84   Pulse 57   Temp 98.2  F (36.8  C) (Tympanic)   Resp 20   Ht 1.791 m (5' 10.5\")   Wt 82.3 kg (181 lb 8 oz)   SpO2 98%   BMI 25.67 kg/m   Estimated body mass index is 25.67 kg/m  as calculated from the following:    Height as of this encounter: 1.791 m (5' 10.5\").    Weight as of this encounter: 82.3 kg (181 lb 8 oz).         Norma J. Gosselin, LINN   "

## 2023-04-10 NOTE — PROGRESS NOTES
"Assessment & Plan   1. Upper back strain, initial encounter  I think this likely represents a sprain of the rhomboid muscle however differential diagnosis also includes paraspinous muscle sprain or strain, thoracic disc disease with radiculopathy, slipped rib, compression fracture, pleurisy, costochondritis, rotator cuff tendinopathy, others.  Story not consistent with cardiac symptoms.  Reassurance provided.  Warning signs discussed and prompt repeat evaluation recommended if symptoms worsen.  - Physical Therapy Referral; Future      Patient Instructions    -- PT for rhomboid/shoulder   -- Consider consult to Dr. Fer Quiñones if not improving   -- If you notice more exertional based symptoms would consider stress testing      Return if symptoms worsen or fail to improve.    Signed, Tyler Almonte MD, FAAP, FACP  Internal Medicine & Pediatrics    Subjective   Anjel Payton is a 67 year old male who presents for evaluation of back and arm pain.  He has been experiencing symptoms for about 6 weeks.  Its mostly in the left upper back but its not along the spine.  Sometimes it radiates to the chest.  No known injury or trauma.  He is right-handed.  Seems to be exacerbated with use of the arm and better with resting.  He exercises a lot at the gym and gets his heart rate up to 150 without chest pain.  He occasionally gets heart palpitations when lying down at night.  He is worried it may be an internal organ problem.  He denies anxiety.    Objective   Vitals: /84   Pulse 57   Temp 98.2  F (36.8  C) (Tympanic)   Resp 20   Ht 1.791 m (5' 10.5\")   Wt 82.3 kg (181 lb 8 oz)   SpO2 98%   BMI 25.67 kg/m      Cardiovascular: Regular rate and rhythm, no murmur  Pulmonary: Clear  Musculoskeletal: There is mild tenderness to palpation over the left rhomboid.  There is no scapular winging with modified push-up.  Negative can sign.  Negative Neer's and Davies.  Mild tenderness to palpation over the superior aspect of the " left scapula without crepitus.

## 2023-04-10 NOTE — PATIENT INSTRUCTIONS
-- PT for rhomboid/shoulder   -- Consider consult to Dr. Fer Quiñones if not improving   -- If you notice more exertional based symptoms would consider stress testing

## 2023-04-10 NOTE — TELEPHONE ENCOUNTER
Patient transferred to scheduling for an appointment today. Nadira Clinton RN on 4/10/2023 at 7:49 AM      Reason for Disposition    All other patients with chest pain (Exception: fleeting chest pain lasting a few seconds)    Additional Information    Negative: SEVERE difficulty breathing (e.g., struggling for each breath, speaks in single words)    Negative: Passed out (i.e., fainted, collapsed and was not responding)    Negative: Difficult to awaken or acting confused (e.g., disoriented, slurred speech)    Negative: Shock suspected (e.g., cold/pale/clammy skin, too weak to stand, low BP, rapid pulse)    Negative: Chest pain lasting longer than 5 minutes and ANY of the following:* Over 44 years old* Over 30 years old and at least one cardiac risk factor (e.g., diabetes mellitus, high blood pressure, high cholesterol, smoker, or strong family history of heart disease)* History of heart disease (i.e., angina, heart attack, heart failure, bypass surgery, takes nitroglycerin)* Pain is crushing, pressure-like, or heavy    Negative: Heart beating < 50 beats per minute OR > 140 beats per minute    Negative: Visible sweat on face or sweat dripping down face    Negative: Sounds like a life-threatening emergency to the triager    Negative: Followed an injury to chest    Negative: SEVERE chest pain    Negative: Pain also in shoulder(s) or arm(s) or jaw    Negative: Difficulty breathing    Negative: Cocaine use within last 3 days    Negative: Major surgery in the past month    Negative: Hip or leg fracture (broken bone) in past month (or had cast on leg or ankle in past month)    Negative: Illness requiring prolonged bedrest in past month (e.g., immobilization, long hospital stay)    Negative: Long-distance travel in past month (e.g., car, bus, train, plane; with trip lasting 6 or more hours)    Negative: History of prior 'blood clot' in leg or lungs (i.e., deep vein thrombosis, pulmonary embolism)    Negative: History of  "inherited increased risk of blood clots (e.g., Factor 5 Leiden, Anti-thrombin 3, Protein C or Protein S deficiency, Prothrombin mutation)    Negative: Cancer treatment in the past two months (or has cancer now)    Negative: Heart beating irregularly or very rapidly    Negative: Chest pain lasting longer than 5 minutes and occurred in last 3 days (72 hours) (Exception: feels exactly the same as previously diagnosed heartburn and has accompanying sour taste in mouth)    Negative: Chest pain or 'angina' comes and goes and is happening more often (increasing in frequency) or getting worse (increasing in severity) (Exception: chest pains that last only a few seconds)    Negative: Dizziness or lightheadedness    Negative: Coughing up blood    Negative: Patient sounds very sick or weak to the triager    Negative: Patient says chest pain feels exactly the same as previously diagnosed 'heartburn' and describes burning in chest and accompanying sour taste in mouth    Negative: Fever > 100.4 F (38.0 C)    Negative: Chest pain(s) lasting a few seconds persists > 3 days    Negative: Rash in same area as pain (may be described as 'small blisters')    Answer Assessment - Initial Assessment Questions  1. LOCATION: \"Where does it hurt?\"        Upper back, down the arm, now moving into the chest/upper chest under the armpit  2. RADIATION: \"Does the pain go anywhere else?\" (e.g., into neck, jaw, arms, back)      As above  3. ONSET: \"When did the chest pain begin?\" (Minutes, hours or days)       6 weeks or so  4. PATTERN \"Does the pain come and go, or has it been constant since it started?\"  \"Does it get worse with exertion?\"       Takes 4 ibuprofen and clears up for 5-6 hours, does this every other day  5. DURATION: \"How long does it last\" (e.g., seconds, minutes, hours)      Will be present constantly unless he takes something or it is rubbed  6. SEVERITY: \"How bad is the pain?\"  (e.g., Scale 1-10; mild, moderate, or severe)     - " "MILD (1-3): doesn't interfere with normal activities      - MODERATE (4-7): interferes with normal activities or awakens from sleep     - SEVERE (8-10): excruciating pain, unable to do any normal activities        5 at the worst  7. CARDIAC RISK FACTORS: \"Do you have any history of heart problems or risk factors for heart disease?\" (e.g., angina, prior heart attack; diabetes, high blood pressure, high cholesterol, smoker, or strong family history of heart disease)      Has had two stress tests which were okay, but takes high PB pills  8. PULMONARY RISK FACTORS: \"Do you have any history of lung disease?\"  (e.g., blood clots in lung, asthma, emphysema, birth control pills)      denies  9. CAUSE: \"What do you think is causing the chest pain?\"      muscular  10. OTHER SYMPTOMS: \"Do you have any other symptoms?\" (e.g., dizziness, nausea, vomiting, sweating, fever, difficulty breathing, cough)        denies  11. PREGNANCY: \"Is there any chance you are pregnant?\" \"When was your last menstrual period?\"        no    Protocols used: CHEST PAIN-A-OH      "

## 2023-04-27 ENCOUNTER — APPOINTMENT (OUTPATIENT)
Dept: GENERAL RADIOLOGY | Facility: OTHER | Age: 68
End: 2023-04-27
Attending: STUDENT IN AN ORGANIZED HEALTH CARE EDUCATION/TRAINING PROGRAM
Payer: COMMERCIAL

## 2023-04-27 ENCOUNTER — APPOINTMENT (OUTPATIENT)
Dept: CT IMAGING | Facility: OTHER | Age: 68
End: 2023-04-27
Attending: STUDENT IN AN ORGANIZED HEALTH CARE EDUCATION/TRAINING PROGRAM
Payer: COMMERCIAL

## 2023-04-27 ENCOUNTER — HOSPITAL ENCOUNTER (EMERGENCY)
Facility: OTHER | Age: 68
Discharge: HOME | End: 2023-04-27
Attending: STUDENT IN AN ORGANIZED HEALTH CARE EDUCATION/TRAINING PROGRAM | Admitting: STUDENT IN AN ORGANIZED HEALTH CARE EDUCATION/TRAINING PROGRAM
Payer: COMMERCIAL

## 2023-04-27 VITALS
SYSTOLIC BLOOD PRESSURE: 119 MMHG | RESPIRATION RATE: 10 BRPM | DIASTOLIC BLOOD PRESSURE: 76 MMHG | HEIGHT: 71 IN | HEART RATE: 56 BPM | OXYGEN SATURATION: 100 % | BODY MASS INDEX: 25.2 KG/M2 | WEIGHT: 180 LBS | TEMPERATURE: 98 F

## 2023-04-27 DIAGNOSIS — M54.6 LEFT-SIDED THORACIC BACK PAIN, UNSPECIFIED CHRONICITY: ICD-10-CM

## 2023-04-27 LAB
ANION GAP SERPL CALCULATED.3IONS-SCNC: 11 MMOL/L (ref 7–15)
BASOPHILS # BLD AUTO: 0 10E3/UL (ref 0–0.2)
BASOPHILS NFR BLD AUTO: 0 %
BUN SERPL-MCNC: 22.3 MG/DL (ref 8–23)
CALCIUM SERPL-MCNC: 9.5 MG/DL (ref 8.8–10.2)
CHLORIDE SERPL-SCNC: 100 MMOL/L (ref 98–107)
CREAT SERPL-MCNC: 0.65 MG/DL (ref 0.67–1.17)
CRP SERPL-MCNC: 18.52 MG/L
D DIMER PPP FEU-MCNC: 1.79 UG/ML FEU (ref 0–0.5)
DEPRECATED HCO3 PLAS-SCNC: 31 MMOL/L (ref 22–29)
EOSINOPHIL # BLD AUTO: 0 10E3/UL (ref 0–0.7)
EOSINOPHIL NFR BLD AUTO: 0 %
ERYTHROCYTE [DISTWIDTH] IN BLOOD BY AUTOMATED COUNT: 12.7 % (ref 10–15)
FLUAV RNA SPEC QL NAA+PROBE: NEGATIVE
FLUBV RNA RESP QL NAA+PROBE: NEGATIVE
GFR SERPL CREATININE-BSD FRML MDRD: >90 ML/MIN/1.73M2
GLUCOSE SERPL-MCNC: 104 MG/DL (ref 70–99)
HCT VFR BLD AUTO: 42.6 % (ref 40–53)
HGB BLD-MCNC: 14.3 G/DL (ref 13.3–17.7)
HOLD SPECIMEN: NORMAL
IMM GRANULOCYTES # BLD: 0 10E3/UL
IMM GRANULOCYTES NFR BLD: 1 %
LYMPHOCYTES # BLD AUTO: 0.8 10E3/UL (ref 0.8–5.3)
LYMPHOCYTES NFR BLD AUTO: 24 %
MCH RBC QN AUTO: 31.2 PG (ref 26.5–33)
MCHC RBC AUTO-ENTMCNC: 33.6 G/DL (ref 31.5–36.5)
MCV RBC AUTO: 93 FL (ref 78–100)
MONOCYTES # BLD AUTO: 0.6 10E3/UL (ref 0–1.3)
MONOCYTES NFR BLD AUTO: 19 %
NEUTROPHILS # BLD AUTO: 1.8 10E3/UL (ref 1.6–8.3)
NEUTROPHILS NFR BLD AUTO: 56 %
NRBC # BLD AUTO: 0 10E3/UL
NRBC BLD AUTO-RTO: 0 /100
PLATELET # BLD AUTO: 131 10E3/UL (ref 150–450)
POTASSIUM SERPL-SCNC: 3.8 MMOL/L (ref 3.4–5.3)
RBC # BLD AUTO: 4.58 10E6/UL (ref 4.4–5.9)
RSV RNA SPEC NAA+PROBE: NEGATIVE
SARS-COV-2 RNA RESP QL NAA+PROBE: NEGATIVE
SODIUM SERPL-SCNC: 142 MMOL/L (ref 136–145)
TROPONIN T SERPL HS-MCNC: 11 NG/L
WBC # BLD AUTO: 3.2 10E3/UL (ref 4–11)

## 2023-04-27 PROCEDURE — 99285 EMERGENCY DEPT VISIT HI MDM: CPT | Mod: 25,CS | Performed by: STUDENT IN AN ORGANIZED HEALTH CARE EDUCATION/TRAINING PROGRAM

## 2023-04-27 PROCEDURE — C9803 HOPD COVID-19 SPEC COLLECT: HCPCS | Performed by: STUDENT IN AN ORGANIZED HEALTH CARE EDUCATION/TRAINING PROGRAM

## 2023-04-27 PROCEDURE — 99284 EMERGENCY DEPT VISIT MOD MDM: CPT | Mod: CS | Performed by: STUDENT IN AN ORGANIZED HEALTH CARE EDUCATION/TRAINING PROGRAM

## 2023-04-27 PROCEDURE — 86140 C-REACTIVE PROTEIN: CPT | Performed by: STUDENT IN AN ORGANIZED HEALTH CARE EDUCATION/TRAINING PROGRAM

## 2023-04-27 PROCEDURE — 85379 FIBRIN DEGRADATION QUANT: CPT | Performed by: STUDENT IN AN ORGANIZED HEALTH CARE EDUCATION/TRAINING PROGRAM

## 2023-04-27 PROCEDURE — 82310 ASSAY OF CALCIUM: CPT | Performed by: STUDENT IN AN ORGANIZED HEALTH CARE EDUCATION/TRAINING PROGRAM

## 2023-04-27 PROCEDURE — 99285 EMERGENCY DEPT VISIT HI MDM: CPT | Mod: 25,CS

## 2023-04-27 PROCEDURE — 250N000011 HC RX IP 250 OP 636: Performed by: STUDENT IN AN ORGANIZED HEALTH CARE EDUCATION/TRAINING PROGRAM

## 2023-04-27 PROCEDURE — 93010 ELECTROCARDIOGRAM REPORT: CPT | Performed by: STUDENT IN AN ORGANIZED HEALTH CARE EDUCATION/TRAINING PROGRAM

## 2023-04-27 PROCEDURE — 71275 CT ANGIOGRAPHY CHEST: CPT

## 2023-04-27 PROCEDURE — 71046 X-RAY EXAM CHEST 2 VIEWS: CPT

## 2023-04-27 PROCEDURE — 87637 SARSCOV2&INF A&B&RSV AMP PRB: CPT | Performed by: STUDENT IN AN ORGANIZED HEALTH CARE EDUCATION/TRAINING PROGRAM

## 2023-04-27 PROCEDURE — 84484 ASSAY OF TROPONIN QUANT: CPT | Performed by: STUDENT IN AN ORGANIZED HEALTH CARE EDUCATION/TRAINING PROGRAM

## 2023-04-27 PROCEDURE — 93005 ELECTROCARDIOGRAM TRACING: CPT | Performed by: STUDENT IN AN ORGANIZED HEALTH CARE EDUCATION/TRAINING PROGRAM

## 2023-04-27 PROCEDURE — 85025 COMPLETE CBC W/AUTO DIFF WBC: CPT | Performed by: STUDENT IN AN ORGANIZED HEALTH CARE EDUCATION/TRAINING PROGRAM

## 2023-04-27 PROCEDURE — 36415 COLL VENOUS BLD VENIPUNCTURE: CPT | Performed by: STUDENT IN AN ORGANIZED HEALTH CARE EDUCATION/TRAINING PROGRAM

## 2023-04-27 PROCEDURE — C9803 HOPD COVID-19 SPEC COLLECT: HCPCS

## 2023-04-27 PROCEDURE — 93005 ELECTROCARDIOGRAM TRACING: CPT

## 2023-04-27 RX ORDER — IOPAMIDOL 755 MG/ML
79 INJECTION, SOLUTION INTRAVASCULAR ONCE
Status: COMPLETED | OUTPATIENT
Start: 2023-04-27 | End: 2023-04-27

## 2023-04-27 RX ADMIN — IOPAMIDOL 79 ML: 755 INJECTION, SOLUTION INTRAVENOUS at 09:16

## 2023-04-27 ASSESSMENT — ACTIVITIES OF DAILY LIVING (ADL)
ADLS_ACUITY_SCORE: 35
ADLS_ACUITY_SCORE: 35

## 2023-04-27 NOTE — DISCHARGE INSTRUCTIONS
Your evaluation today was reassuring for unlikely serious immediately life-threatening cause for your left upper back pain. Recommend close follow-up with a primary care provider.  You can continue to use Tylenol up to 1000 mg every 6 hours.  Use ibuprofen sparingly daily and always take with food to avoid stomach ulcers.  Reasons to return to the emergency department include severe intolerable pain, significant concerning change in your pain, or new numbness or weakness in your left or right upper extremity, or shortness of breath.

## 2023-04-27 NOTE — ED TRIAGE NOTES
"Pt comes into the ER today reporting 8 weeks of chest/back and shoulder pain that is constant ache and sharp that he was been seen for in the clinic twice for (once during a physical with Grant and again for follow up with Dr. Almonte). They said it muscular skeletal. Told to get into physical therapy (this is a month out) he has been working out still. The pain was so bad that he couldn't use his bike  for more than 20 minutes last night. His back feels better when his wife rubs it, she feels something move and \"click\" there. He has been taking tylenol and ibuprofen with relief, that doesn't last long.  No meds since 0100 this morning  Last night he noted a fever of 102. He hasn't taken his temp before for this. No shortness of breath, nausea, vomiting, doesn't feel ill. Last night it \"really hurt\" and he felt \"hot\"  The pain has been getting worse, it is constant. He has not had an EKG or an xray for this. Otherwise healthy     Triage Assessment     Row Name 04/27/23 0740       Triage Assessment (Adult)    Airway WDL WDL       Respiratory WDL    Respiratory WDL WDL       Skin Circulation/Temperature WDL    Skin Circulation/Temperature WDL WDL       Cardiac WDL    Cardiac WDL WDL       Peripheral/Neurovascular WDL    Peripheral Neurovascular WDL WDL       Cognitive/Neuro/Behavioral WDL    Cognitive/Neuro/Behavioral WDL WDL              "

## 2023-04-27 NOTE — ED PROVIDER NOTES
History     Chief Complaint   Patient presents with     Chest Pain     Fever       Anjel Payton is a 67 year old male who presents with left upper back pain.  Onset 8 weeks ago and has been constant since.  It improves briefly with ibuprofen or Tylenol but then quickly returns.  Also has been using ice and heat and Voltaren gel and Salonpas lidocaine.  Pain varies in intensity but can reach severe 10/10.  No obvious modifications but upon having patient arch his back that seems to help.  Pain described as aching and sharp and can radiate to his left shoulder and into his very left upper chest and into his arm.  Denies any worsening with exercise typically, however last evening he could not use his bike  for more than 20 minutes.  Denies any associated lightheadedness, dyspnea, cough, nausea, vomiting, dysuria, numbness, weakness.  His wife may rub the area just left of his upper thoracic spine and can sometimes reproduce some of the pain and sometimes the rubbing briefly.  He does note a fever periodically with last night a fever of 102 Fahrenheit.  Denies any recent trauma.  Pain is not worsened with use of his left arm.  Due to no improvement and the fact that it does sometimes involve his left upper chest, he presented to the ED.     Triage Assessment     Row Name 04/27/23 0740       Triage Assessment (Adult)    Airway WDL WDL       Respiratory WDL    Respiratory WDL WDL       Skin Circulation/Temperature WDL    Skin Circulation/Temperature WDL WDL       Cardiac WDL    Cardiac WDL WDL       Peripheral/Neurovascular WDL    Peripheral Neurovascular WDL WDL       Cognitive/Neuro/Behavioral WDL    Cognitive/Neuro/Behavioral WDL WDL                No Known Allergies    Patient Active Problem List    Diagnosis Date Noted     Acute gastric ulcer due to Helicobacter pylori      Priority: Medium     Gastroesophageal reflux disease with esophagitis 05/01/2019     Priority: Medium     Acute ulcer of  antrum  2019     Priority: Medium     Lower urinary tract symptoms (LUTS) 2018     Priority: Medium     H/O adenomatous polyp of colon 2018     Priority: Medium     Family history of colon cancer mom  62 2018     Priority: Medium     Benign essential hypertension 2018     Priority: Medium     Lumbago 2018     Priority: Medium     Overview:   with L5-S1 disc protrusion, moderate and asymmetric to the right with right S1   nerve rootlet displaced posteriorly       Vegetarian 2014     Priority: Medium       Past Medical History:   Diagnosis Date     Acute gastric ulcer due to Helicobacter pylori      Essential (primary) hypertension      Lower urinary tract symptoms (LUTS)      Tubular adenoma        Past Surgical History:   Procedure Laterality Date     COLONOSCOPY      10/5/09     COLONOSCOPY N/A 2018    F/U   tubular adenomas     ENDOSCOPY  2019    EGD antral ulcer     ESOPHAGOSCOPY, GASTROSCOPY, DUODENOSCOPY (EGD), COMBINED N/A 2019    Procedure: ESOPHAGOGASTRODUODENOSCOPY, WITH BIOPSY;  Surgeon: Jorge Mcclain MD;  Location: [facility] OR     HERNIA REPAIR, UMBILICAL       LAPAROSCOPIC CHOLECYSTECTOMY  2017     RELEASE CARPAL TUNNEL Right        Family History   Problem Relation Age of Onset     Colon Cancer Mother          age 60 or 61     Diabetes Father         Diabetes, post renal transplant secondary to diabetic complications,     Hypertension Father      Heart Disease Father          of myocardial infarction at age 57     Heart Disease Brother         With bicuspid aortic valve, status post aortic valve replacement     Breast Cancer Sister        Social History     Tobacco Use     Smoking status: Former     Types: Cigarettes     Quit date: 10/25/1975     Years since quittin.5     Smokeless tobacco: Former     Quit date: 10/25/1998   Vaping Use     Vaping status: Never Used   Substance Use Topics     Alcohol use: Yes     Alcohol/week:  "0.8 standard drinks of alcohol     Comment: 1 weekly      Drug use: No     Comment: Drug use: No       Medications:    hydrochlorothiazide (HYDRODIURIL) 12.5 MG tablet  lisinopril (ZESTRIL) 20 MG tablet  [START ON 7/12/2023] bisacodyl (DULCOLAX) 5 MG EC tablet  Multiple Vitamin (MULTI-VITAMINS) TABS  [START ON 7/12/2023] polyethylene glycol-electrolytes (NULYTELY) 420 g solution        Review of Systems: See HPI for pertinent negatives and positives. All other systems reviewed and found to be negative.    Physical Exam   /78   Pulse 59   Temp 98  F (36.7  C) (Tympanic)   Resp 16   Ht 1.803 m (5' 11\")   Wt 81.6 kg (180 lb)   SpO2 98%   BMI 25.10 kg/m       General: awake, comfortable  HEENT: atraumatic  Respiratory: normal effort, clear to auscultation bilaterally  Cardiovascular: regular rate and rhythm, no murmurs, left radial and ulnar pulses 2+  Abdomen: soft, nondistended, nontender  Extremities: no deformities, edema, or tenderness; normal range of motion of left shoulder and strength of left upper extremity  Back: No spinal tenderness or tenderness over left scapula, no masses  Skin: warm, dry, no rashes  Neuro: alert, no focal deficits including left upper extremity sensation which is intact  Psych: appropriate mood and affect    ED Course      ED Course as of 04/27/23 1102   Thu Apr 27, 2023   0812 EKG: sinus serene 52, no evidence of acute ischemia with no ST abnormalities, LBBB, I/II/V4-6 TWI.        Results for orders placed or performed during the hospital encounter of 04/27/23 (from the past 24 hour(s))   CBC with platelets differential    Narrative    The following orders were created for panel order CBC with platelets differential.  Procedure                               Abnormality         Status                     ---------                               -----------         ------                     CBC with platelets and d...[[id number]]  Abnormal            Final result             "     Please view results for these tests on the individual orders.   Basic metabolic panel   Result Value Ref Range    Sodium 142 136 - 145 mmol/L    Potassium 3.8 3.4 - 5.3 mmol/L    Chloride 100 98 - 107 mmol/L    Carbon Dioxide (CO2) 31 (H) 22 - 29 mmol/L    Anion Gap 11 7 - 15 mmol/L    Urea Nitrogen 22.3 8.0 - 23.0 mg/dL    Creatinine 0.65 (L) 0.67 - 1.17 mg/dL    Calcium 9.5 8.8 - 10.2 mg/dL    Glucose 104 (H) 70 - 99 mg/dL    GFR Estimate >90 >60 mL/min/1.73m2   D dimer quantitative   Result Value Ref Range    D-Dimer Quantitative 1.79 (H) 0.00 - 0.50 ug/mL FEU    Narrative    This D-dimer assay is intended for use in conjunction with a clinical pretest probability assessment model to exclude pulmonary embolism (PE) and deep venous thrombosis (DVT) in outpatients suspected of PE or DVT. The cut-off value is 0.50 ug/mL FEU.   Troponin T, High Sensitivity   Result Value Ref Range    Troponin T, High Sensitivity 11 <=22 ng/L   CRP inflammation   Result Value Ref Range    CRP Inflammation 18.52 (H) <5.00 mg/L   Symptomatic Influenza A/B, RSV, & SARS-CoV2 PCR (COVID-19) Nose    Specimen: Nose; Swab   Result Value Ref Range    Influenza A PCR Negative Negative    Influenza B PCR Negative Negative    RSV PCR Negative Negative    SARS CoV2 PCR Negative Negative    Narrative    Testing was performed using the Xpert Xpress CoV2/Flu/RSV Assay on the [employer] GeneXpert Instrument. This test should be ordered for the detection of SARS-CoV-2, influenza, and RSV viruses in individuals who meet clinical and/or epidemiological criteria. Test performance is unknown in asymptomatic patients. This test is for in vitro diagnostic use under the FDA EUA for laboratories certified under CLIA to perform high or moderate complexity testing. This test has not been FDA cleared or approved. A negative result does not rule out the presence of PCR inhibitors in the specimen or target RNA in concentration below the limit of detection for the  assay. If only one viral target is positive but coinfection with multiple targets is suspected, the sample should be re-tested with another FDA cleared, approved, or authorized test, if coinfection would change clinical management. This test was validated by the [facility]. These laboratories are certified under the Clinical Laboratory Improvement Amendments of 1988 (CLIA-88) as qualified to perform high complexity laboratory testing.   CBC with platelets and differential   Result Value Ref Range    WBC Count 3.2 (L) 4.0 - 11.0 10e3/uL    RBC Count 4.58 4.40 - 5.90 10e6/uL    Hemoglobin 14.3 13.3 - 17.7 g/dL    Hematocrit 42.6 40.0 - 53.0 %    MCV 93 78 - 100 fL    MCH 31.2 26.5 - 33.0 pg    MCHC 33.6 31.5 - 36.5 g/dL    RDW 12.7 10.0 - 15.0 %    Platelet Count 131 (L) 150 - 450 10e3/uL    % Neutrophils 56 %    % Lymphocytes 24 %    % Monocytes 19 %    % Eosinophils 0 %    % Basophils 0 %    % Immature Granulocytes 1 %    NRBCs per 100 WBC 0 <1 /100    Absolute Neutrophils 1.8 1.6 - 8.3 10e3/uL    Absolute Lymphocytes 0.8 0.8 - 5.3 10e3/uL    Absolute Monocytes 0.6 0.0 - 1.3 10e3/uL    Absolute Eosinophils 0.0 0.0 - 0.7 10e3/uL    Absolute Basophils 0.0 0.0 - 0.2 10e3/uL    Absolute Immature Granulocytes 0.0 <=0.4 10e3/uL    Absolute NRBCs 0.0 10e3/uL   Extra Tube    Narrative    The following orders were created for panel order Extra Tube.  Procedure                               Abnormality         Status                     ---------                               -----------         ------                     Extra Red Top Tube[[id number]]                               Final result                 Please view results for these tests on the individual orders.   Extra Red Top Tube   Result Value Ref Range    Hold Specimen JI    XR Chest 2 Views    Narrative    PROCEDURE: XR CHEST 2 VIEWS 4/27/2023 8:35 AM    HISTORY: left upper back pain just lateral to upper thoracic spine w/  radiation to left  shoulder/arm    COMPARISONS: 1/17/2023.    TECHNIQUE: 2 views.    FINDINGS: Heart and pulmonary vasculature are normal. Lungs are clear  and no pleural effusion is seen.    Degenerative changes seen in the mid and lower thoracic spine. Slight  anterior wedging of midthoracic vertebral bodies is stable.         Impression    IMPRESSION: No acute disease.    SANTOS PIERRE MD         SYSTEM ID:  [id number]   CT Chest Pulmonary Embolism w Contrast    Narrative    CT CHEST PULMONARY EMBOLISM W CONTRAST  4/27/2023 9:26 AM    CLINICAL HISTORY: Male, age 67 years,  Left upper back pain x 8 wks,  +dimer;    Comparison:  Chest x-ray 4/27/2023; cardiac calcium CT 2/23/2022    TECHNIQUE: CT angiogram was performed of the chest  with IV contrast.   Axial; sagittal and coronal reconstructed images were reviewed..     FINDINGS:  Chest CT angiogram:    There is excellent opacification of the arterial structures without  evidence of acute abnormality. No evidence of pulmonary embolus. No  evidence of aortic dissection. Heart and great vessels demonstrate no  acute abnormality.    Lungs demonstrate mild air trapping and areas of atelectasis in the  dependent portions of both lungs. No pneumothorax. No evidence of  well-defined pneumonia.    A 4.9 x 2.4 mm subpleural nodule seen anteriorly in the right middle  lobe is similar in appearance when measured in a similar fashion.    Thyroid gland and esophagus are unremarkable. There is no evidence of  pathologic lymph node enlargement.      Visualized portions of the upper abdomen demonstrate no acute  abnormality.    Bony structures: No acute abnormality. Mild-to-moderate degenerative  change throughout the thoracic spine.      Impression    IMPRESSION:   Good quality CTA demonstrates no evidence of acute abnormality. No  evidence of pulmonary embolus or aortic dissection.    Nonacute changes as described above.     Stable appearance of 4.9 x 2.4 mm solid subpleural nodule in the  right  lung over the course of greater than 1 year, likely benign. No  additional follow-up recommended at this time.    This facility minimizes radiation dose by adjusting the mA and/or kV  according to each patient size.      This CT scan was performed using one or more the following dose  reduction techniques:    -Automated exposure control,  -Adjustment of the mA and/or kV according to patient's size, and/or,  -Use of iterative reconstruction technique.    KOLBY PLUMMER MD         SYSTEM ID:  [id number]       Medications   iopamidol (ISOVUE-370) solution 79 mL (79 mLs Intravenous $Given 4/27/23 0989)       Assessments & Plan (with Medical Decision Making)     I have reviewed the nursing notes.    67 year old male evaluated for 8 weeks of persistent left thoracic back pain.  Evaluation remarkable for elevated CRP and D-dimer.  CT PE study negative for blood clot and this was also discussed with radiology over the phone with no obvious MSK abnormalities that could be contributing to his pain such as pathologic fracture or areas that would suggest nerve impingement.  Reassurance provided and recommend close PCP follow-up and continued home treatments.  Discharged home with ED return precautions.      I have reviewed the findings, diagnosis, plan, and need for any follow up with the patient.    New Prescriptions    No medications on file       Final diagnoses:   Left-sided thoracic back pain, unspecified chronicity - subacute       4/27/2023   [facility]     Ge Andersen MD  04/27/23 8818

## 2023-05-04 LAB
ATRIAL RATE - MUSE: 52 BPM
DIASTOLIC BLOOD PRESSURE - MUSE: NORMAL MMHG
INTERPRETATION ECG - MUSE: NORMAL
P AXIS - MUSE: 43 DEGREES
PR INTERVAL - MUSE: 176 MS
QRS DURATION - MUSE: 92 MS
QT - MUSE: 412 MS
QTC - MUSE: 383 MS
R AXIS - MUSE: 19 DEGREES
SYSTOLIC BLOOD PRESSURE - MUSE: NORMAL MMHG
T AXIS - MUSE: 24 DEGREES
VENTRICULAR RATE- MUSE: 52 BPM

## 2023-05-16 ENCOUNTER — HOSPITAL ENCOUNTER (OUTPATIENT)
Dept: PHYSICAL THERAPY | Facility: OTHER | Age: 68
Setting detail: THERAPIES SERIES
Discharge: HOME | End: 2023-05-16
Attending: PHYSICAL MEDICINE & REHABILITATION
Payer: COMMERCIAL

## 2023-05-16 DIAGNOSIS — S29.012A UPPER BACK STRAIN, INITIAL ENCOUNTER: ICD-10-CM

## 2023-05-16 PROCEDURE — 97161 PT EVAL LOW COMPLEX 20 MIN: CPT | Mod: GP,PN

## 2023-05-16 PROCEDURE — 97110 THERAPEUTIC EXERCISES: CPT | Mod: GP,PN

## 2023-05-16 NOTE — PROGRESS NOTES
"   05/16/23 0900   General Information   Type of Visit Initial OP Ortho PT Evaluation   Start of Care Date 05/16/23   Referring Physician Dr. Emir Almonte   Patient/Family Goals Statement To be able to ghet through his day with less upper back discomfort   Orders Evaluate and Treat   Date of Order 04/10/23   Certification Required? Yes   Medical Diagnosis Upper back strain, initial encounter (S29.012A)   Surgical/Medical history reviewed Yes   Precautions/Limitations no known precautions/limitations   Special Instructions None   Body Part(s)   Body Part(s) Cervical Spine   Presentation and Etiology   Pertinent history of current problem (include personal factors and/or comorbidities that impact the POC) Patient is a 66 y/o male whom presents to PT with c/o svereal weeks of intermittent leftr upper back pain. Reports continued pain today he rates as 5/10 up to 9/10 VAS. Reports pain involves his left scapular region with radiation of pain to the left shoulder and to the right lateral arm intermittently. He recently recieved two treatments with Chiropractic that was beneficial \"maybe\" for a short period of time. He is a very active person and just yesterday rode his bike 105 miles. He generally feel better with activity than when sitting. He did present to the ER with this pain and received CT scans and x-ray all of which were nml.  Patient reports that he does have some pain with deep breathing and deep coughing in the left scapular region.   Impairments A. Pain   Functional Limitations perform desired leisure / sports activities;perform activities of daily living   Symptom Location Left upper back / scapular region   How/Where did it occur From insidious onset   Onset date of current episode/exacerbation 03/01/23   Chronicity Chronic   Pain rating (0-10 point scale) Best (/10);Worst (/10)   Best (/10) 4   Worst (/10) 8   Pain quality B. Dull;C. Aching   Frequency of pain/symptoms A. Constant   Pain/symptoms are: " Worse during the night   Pain/symptoms exacerbated by A. Sitting;G. Certain positions   Pain/symptoms eased by G. Heat;H. Cold;I. OTC medication(s)  (Ibuprofen and tylenol. 800 mg of ibuprofen daniela help considerably.)   Progression of symptoms since onset: Worsened   Prior Level of Function   Prior Level of Function-Mobility NML   Prior Level of Function-ADLs NML   Current Level of Function   Current Community Support Family/friend caregiver   Patient role/employment history F. Retired   [facility]   Home/community accessibility NML   Fall Risk Screen   Fall screen completed by PT   Have you fallen 2 or more times in the past year? No   Have you fallen and had an injury in the past year? No   Is patient a fall risk? No   Abuse Screen (yes response referral indicated)   Feels Unsafe at Home or Work/School no   Feels Threatened by Someone no   Does Anyone Try to Keep You From Having Contact with Others or Doing Things Outside Your Home? no   Physical Signs of Abuse Present no   Cervical Spine   Cervical Left Side Bending ROM 28 degrees   Cervical Right Rotation ROM 48   Cervical Left Rotation ROM 51   Cervical Flexion ROM Within functional limits   Cervical Extension ROM 35 degrees   Cervical Right Side Bending ROM 24 degrees   Thoracic Flexion ROM WFL   Thoracic Extension ROM Noted limitation   Thoracic Right Side Bending ROM WFL   Thoracic Left Sidebending ROM  WFL   Thoracic Right Rotation WFL   Thoracic Left Rotation Noted limitation in upper thoracic   Shoulder AROM Screen Bilateral shoulder mobility is all within normal limits   Shoulder/Wrist/Hand Strength Comments All tested strength in bilateral upper extremities is 5/5 MMT   Upper Trapezius Flexibility Bilateral tightness   Levator Scapula Flexibility Increased tightness left compared to right   Scalene Flexibility Symmetrical tightness right to left   Pectoralis Minor Flexibility Noted bilateral tightness   Spurling Test Negative    Cervical Distraction Test Negative   Neer Impingement Test Negative   Davies Impingement Test Negative   Segmental Mobility-Cervical Note generalized hypomobility in the lower cervical vertebral motion segments from C5-C7   Segmental Mobility-Thoracic Noted hypomobility with a flexed dysfunction in the upper thoracic region from T1-T5   Palpation There is significant tenderness of at least 2-3/4 with palpation of an area approximately 2 cm x 2 cm lateral left to the T4 and T5 spinous process.  This appears to be in soft tissue and not directly over a specific rib.  Deep palpation of the ribs in this area fails to recreate the pain of the patient's complaint.  There is no tenderness with palpation over the spinous processes of the thoracic spine.  There is no tenderness with direct palpation over the left scapula.   Neurological Testing Comments Intact   Observation No observable deformity   Integumentary  Normal   Posture Slight to moderate forward head and rounded shoulder posture   Planned Therapy Interventions   Planned Therapy Interventions joint mobilization;manual therapy;ROM;stretching   Planned Modality Interventions   Planned Modality Interventions Hot packs;Cryotherapy;Ultrasound   Planned Modality Interventions Comments Modalities will be used as an adjunct to the intended course of exercise based and manual physical therapy treatment   Clinical Impression   Criteria for Skilled Therapeutic Interventions Met yes, treatment indicated   PT Diagnosis Left upper back and scapular region pain due to scapulothoracic bursitis/rib dysfunction.   Influenced by the following impairments Pain, limited endurance   Functional limitations due to impairments Patient is unable to complete the number of tasks he would like during his day due to reduced endurance related to constant left upper back pain   Clinical Presentation Stable/Uncomplicated   Clinical Decision Making (Complexity) Low complexity   Therapy  Frequency 2 times/Week   Predicted Duration of Therapy Intervention (days/wks) 10 to 12 weeks   Risk & Benefits of therapy have been explained Yes   Patient, Family & other staff in agreement with plan of care Yes   Clinical Impression Comments Patient has generally good muscle tone and is in obviously good physical condition based on his ability to bike for up to 100 miles per day and his daily workout habits.  The level of pain that he is experiencing with this left upper back dysfunction is concerning considering no known injury.   Education Assessment   Preferred Learning Style Listening;Reading;Demonstration;Pictures/video   Barriers to Learning No barriers   ORTHO GOALS   PT Ortho Eval Goals 1;2;3   Ortho Goal 1   Goal Identifier Pain   Goal Description Patient will report that he is able to complete 75% of his day with all normal activities including biking, workouts at the gym and completion of daily home tasks and yard work without limitation due to left upper back pain in excess of 2/10 and 10 to 12 weeks   Target Date 08/16/23   Ortho Goal 2   Goal Identifier Mobility   Goal Description Patient will demonstrate significant improvement in thoracic extension to normal levels, cervical rotation to at least 55 to 60 degrees bilaterally, cervical side flexion to at least 35 degrees bilaterally, and cervical extension to 45 degrees.  This will notably improve patient's overall posture thereby decreasing strain on upper back structures.  Increased mobility will allow him to complete the normal recreational and athletic pursuits without limitation.  Patient will accomplish this goal in 10 to 12 weeks   Target Date 08/16/23   Ortho Goal 3   Goal Identifier Posture   Goal Description Patient will demonstrate significant improvement in upper back and neck region posture with sitting standing and completion of normal daily tasks.  This will occur with an increase in strength in the upper back and stretching of the  anterior chest and shoulder musculature.  Improve posture will allow him to complete his normal daily activities with less discomfort.  This will be accomplished in 10 to 12 weeks   Target Date 08/16/23   Total Evaluation Time   PT Eval, Low Complexity Minutes (55608) 35   Therapy Certification   Certification date from 05/16/23   Certification date to 08/16/23   Medical Diagnosis Upper back strain, initial encounter (S29.012A)

## 2023-05-16 NOTE — PROGRESS NOTES
[facility]    OUTPATIENT PHYSICAL THERAPY ORTHOPEDIC EVALUATION  PLAN OF TREATMENT FOR OUTPATIENT REHABILITATION  (COMPLETE FOR INITIAL CLAIMS ONLY)  Patient's Last Name, First Name, M.I.  YOB: 1955  Anjel Payton    Provider s Name:  [facility]   Medical Record No.  [id number]   Start of Care Date:  05/16/23   Onset Date:  03/01/23   Type:     _X__PT   ___OT   ___SLP Medical Diagnosis:  Upper back strain, initial encounter (S29.012A)     PT Diagnosis:  Left upper back and scapular region pain due to scapulothoracic bursitis/rib dysfunction.   Visits from SOC:  1      _________________________________________________________________________________  Plan of Treatment/Functional Goals:  joint mobilization, manual therapy, ROM, stretching     Hot packs, Cryotherapy, Ultrasound  Modalities will be used as an adjunct to the intended course of exercise based and manual physical therapy treatment  Goals  Goal Identifier: Pain  Goal Description: Patient will report that he is able to complete 75% of his day with all normal activities including biking, workouts at the gym and completion of daily home tasks and yard work without limitation due to left upper back pain in excess of 2/10 and 10 to 12 weeks  Target Date: 08/16/23    Goal Identifier: Mobility  Goal Description: Patient will demonstrate significant improvement in thoracic extension to normal levels, cervical rotation to at least 55 to 60 degrees bilaterally, cervical side flexion to at least 35 degrees bilaterally, and cervical extension to 45 degrees.  This will notably improve patient's overall posture thereby decreasing strain on upper back structures.  Increased mobility will allow him to complete the normal recreational and athletic pursuits without limitation.  Patient will accomplish this goal in 10 to 12  weeks  Target Date: 08/16/23    Goal Identifier: Posture  Goal Description: Patient will demonstrate significant improvement in upper back and neck region posture with sitting standing and completion of normal daily tasks.  This will occur with an increase in strength in the upper back and stretching of the anterior chest and shoulder musculature.  Improve posture will allow him to complete his normal daily activities with less discomfort.  This will be accomplished in 10 to 12 weeks  Target Date: 08/16/23                                                           Therapy Frequency:  2 times/Week  Predicted Duration of Therapy Intervention:  10 to 12 weeks     [facility] [employer] PT                 I CERTIFY THE NEED FOR THESE SERVICES FURNISHED UNDER        THIS PLAN OF TREATMENT AND WHILE UNDER MY CARE     (Physician co-signature of this document indicates review and certification of the therapy plan).                     Certification Date From:  05/16/23   Certification Date To:  08/16/23    Referring Provider:  Dr. Emir Almonte    Initial Assessment        See Epic Evaluation Start of Care Date: 05/16/23

## 2023-05-22 ENCOUNTER — OFFICE VISIT (OUTPATIENT)
Dept: FAMILY MEDICINE | Facility: OTHER | Age: 68
End: 2023-05-22
Attending: FAMILY MEDICINE
Payer: COMMERCIAL

## 2023-05-22 VITALS
HEART RATE: 51 BPM | OXYGEN SATURATION: 98 % | SYSTOLIC BLOOD PRESSURE: 150 MMHG | DIASTOLIC BLOOD PRESSURE: 90 MMHG | WEIGHT: 184 LBS | TEMPERATURE: 97.3 F | RESPIRATION RATE: 16 BRPM | BODY MASS INDEX: 25.66 KG/M2

## 2023-05-22 DIAGNOSIS — S29.012A STRAIN OF LEFT RHOMBOID MUSCLE: ICD-10-CM

## 2023-05-22 DIAGNOSIS — M51.34 THORACIC DEGENERATIVE DISC DISEASE: ICD-10-CM

## 2023-05-22 DIAGNOSIS — M75.52 SCAPULOTHORACIC BURSITIS OF LEFT SHOULDER: Primary | ICD-10-CM

## 2023-05-22 PROCEDURE — 99214 OFFICE O/P EST MOD 30 MIN: CPT | Performed by: FAMILY MEDICINE

## 2023-05-22 PROCEDURE — G0463 HOSPITAL OUTPT CLINIC VISIT: HCPCS

## 2023-05-22 RX ORDER — PREDNISONE 10 MG/1
TABLET ORAL
Qty: 33 TABLET | Refills: 0 | Status: SHIPPED | OUTPATIENT
Start: 2023-05-22 | End: 2023-07-12

## 2023-05-22 ASSESSMENT — PAIN SCALES - GENERAL: PAINLEVEL: SEVERE PAIN (6)

## 2023-05-22 NOTE — PROGRESS NOTES
Sports Medicine Office Note    HPI:  67-year-old male coming in for evaluation of left-sided back and shoulder pain.  His pain has been present since early March 2023.  There was no inciting event or injury.  He localizes the pain to the area between the thoracic vertebrae and the medial border of the scapula.  He rates his pain at 7-8/10.  His pain goes down to 1/10 with taking ibuprofen.  He characterizes the pain as dull and achy.  Sitting for prolonged periods of time seems to worsen his pain and movement improves it.  He saw his primary care doctor on 3/20 and followed up again on 4/10.  He was referred for physical therapy and had his initial visit on 5/16.  He has also had 2 visits with a chiropractor which may have provided mild temporary symptom improvement.  He notes his pain will radiate into his anterior left-sided chest and into his upper arm.  He is able to work out for long periods of time with his heart rate in the 150s without reproducing the symptoms.  His symptoms actually seem to be more bothersome with rest.  No shortness of breath.  No numbness/tingling in the hand/fingers.      EXAM:  BP (!) 150/90 (BP Location: Right arm, Patient Position: Sitting, Cuff Size: Adult Regular)   Pulse 51   Temp 97.3  F (36.3  C) (Temporal)   Resp 16   Wt 83.5 kg (184 lb)   SpO2 98%   BMI 25.66 kg/m    MUSCULOSKELETAL EXAM:  LEFT SHOULDER/BACK  Inspection:  -No gross deformity  -No bruising or swelling  -Scars:  None    Tenderness to palpation of the:  -SC joint:  Negative  -AC joint:  Negative  -Clavicle:  Negative  -Biceps tendon in bicipital groove:  Negative  -Deltoid musculature:  Negative  -Upper trapezius musculature:  Negative  - Rhomboids: Positive  - Scapulothoracic bursa: Positive    Range of Motion:  -Active flexion:  180 left, 180 right  -Active abduction:  180 left, 180 right    Strength:  -Supraspinatus:  5/5  -Infraspinatus:  5/5  -Subscapularis:  5/5  -Deltoid:  5/5    Special Tests:  -Elbert  test:  Negative  -Davies test:  Negative  -Neer test:  Negative  -Mid-arc pain:  Absent  -Lag sign:  Negative  -Scapular dyskinesis: Present, more so on the right    Other:  -Intact sensation to light touch distally.  -No signs of cyanosis. Normal skin temperature of the upper extremity.  -Elbow:  No gross deformity. Full range of motion.  -Hand/wrist:  No gross deformity. Full range of motion.  -Right shoulder:  No gross deformity. No palpable tenderness. Normal strength.      IMAGIN2023: 2 view chest x-ray  - Pulmonary fields are clear.  Mild degenerative changes of the T-spine without evidence of acute fracture.      ASSESSMENT/PLAN:  Diagnoses and all orders for this visit:  Scapulothoracic bursitis of left shoulder  -     predniSONE (DELTASONE) 10 MG tablet; 4 tabs daily x3 days, 3 tabs daily x3 days, 2 tabs daily x3 days, 1 tab daily x3 days, 0.5 tab daily x 4 days, 0.5 tab every other day x 4 days  Strain of left rhomboid muscle  Thoracic degenerative disc disease    67-year-old male with medial scapula/rhomboid pain.  His pain is consistent with scapulothoracic bursitis.  Chest x-ray from  was personally reviewed in the office with the findings as demonstrated above by my interpretation.  No indication of cardiopulmonary etiology on today's evaluation.  Pain that he experiences in the upper arm and chest is likely referred pain/compensatory pain.  As he does get significant symptom relief with ibuprofen, he may benefit from a consistent course with anti-inflammatories to try to eliminate any inflammatory process that is present.  There are no findings to suggest structural abnormalities though if the patient does not respond to conservative interventions, this may be an atypical presentation of cervical radiculopathy which could be worked up at a later date.  As for now this is likely a biomechanical pain that should show some response to anti-inflammatories and PT.  - 2-week prednisone  taper  - Continue with physical therapy  - Activities as tolerated  - If symptoms are not improving in the next 6-8 weeks, patient to return for reevaluation  - Possibly consider scapulothoracic bursa injection versus trigger point injection      Fer Quiñones MD  5/22/2023  10:54 AM    Total time spent with this patient was 37 minutes which included chart review, visualization and independent interpretation of images, time spent with the patient, and documentation.    Procedure time:  0 minute(s)

## 2023-05-22 NOTE — PROGRESS NOTES
Patient presents for left back/shoulder strain.  States it started around mid March  Dolores Sotomayor LPN.......5/22/2023 10:13 AM

## 2023-05-23 ENCOUNTER — THERAPY VISIT (OUTPATIENT)
Dept: PHYSICAL THERAPY | Facility: OTHER | Age: 68
End: 2023-05-23
Attending: PHYSICAL MEDICINE & REHABILITATION
Payer: COMMERCIAL

## 2023-05-23 DIAGNOSIS — S29.012A UPPER BACK STRAIN, INITIAL ENCOUNTER: Primary | ICD-10-CM

## 2023-05-23 PROCEDURE — 97140 MANUAL THERAPY 1/> REGIONS: CPT | Mod: GP

## 2023-05-23 PROCEDURE — 97110 THERAPEUTIC EXERCISES: CPT | Mod: GP

## 2023-05-25 ENCOUNTER — THERAPY VISIT (OUTPATIENT)
Dept: PHYSICAL THERAPY | Facility: OTHER | Age: 68
End: 2023-05-25
Attending: PHYSICAL MEDICINE & REHABILITATION
Payer: COMMERCIAL

## 2023-05-25 DIAGNOSIS — S29.012A UPPER BACK STRAIN, INITIAL ENCOUNTER: Primary | ICD-10-CM

## 2023-05-25 PROCEDURE — 97110 THERAPEUTIC EXERCISES: CPT | Mod: GP

## 2023-05-25 PROCEDURE — 97140 MANUAL THERAPY 1/> REGIONS: CPT | Mod: GP

## 2023-05-30 ENCOUNTER — THERAPY VISIT (OUTPATIENT)
Dept: PHYSICAL THERAPY | Facility: OTHER | Age: 68
End: 2023-05-30
Attending: PHYSICAL MEDICINE & REHABILITATION
Payer: COMMERCIAL

## 2023-05-30 DIAGNOSIS — S29.012A UPPER BACK STRAIN, INITIAL ENCOUNTER: Primary | ICD-10-CM

## 2023-05-30 PROCEDURE — 97140 MANUAL THERAPY 1/> REGIONS: CPT | Mod: GP

## 2023-05-30 PROCEDURE — 97035 APP MDLTY 1+ULTRASOUND EA 15: CPT | Mod: GP

## 2023-07-06 ENCOUNTER — THERAPY VISIT (OUTPATIENT)
Dept: PHYSICAL THERAPY | Facility: OTHER | Age: 68
End: 2023-07-06
Attending: INTERNAL MEDICINE
Payer: COMMERCIAL

## 2023-07-06 DIAGNOSIS — S29.012A UPPER BACK STRAIN, INITIAL ENCOUNTER: Primary | ICD-10-CM

## 2023-07-06 PROCEDURE — 97140 MANUAL THERAPY 1/> REGIONS: CPT | Mod: GP

## 2023-07-19 ENCOUNTER — ANESTHESIA EVENT (OUTPATIENT)
Dept: SURGERY | Facility: OTHER | Age: 68
End: 2023-07-19
Payer: COMMERCIAL

## 2023-07-19 ENCOUNTER — HOSPITAL ENCOUNTER (OUTPATIENT)
Facility: OTHER | Age: 68
Discharge: HOME | End: 2023-07-19
Attending: SURGERY | Admitting: SURGERY
Payer: COMMERCIAL

## 2023-07-19 ENCOUNTER — ANESTHESIA (OUTPATIENT)
Dept: SURGERY | Facility: OTHER | Age: 68
End: 2023-07-19
Payer: COMMERCIAL

## 2023-07-19 VITALS
BODY MASS INDEX: 24.11 KG/M2 | TEMPERATURE: 98.2 F | RESPIRATION RATE: 16 BRPM | OXYGEN SATURATION: 96 % | WEIGHT: 178 LBS | DIASTOLIC BLOOD PRESSURE: 76 MMHG | HEART RATE: 60 BPM | SYSTOLIC BLOOD PRESSURE: 115 MMHG | HEIGHT: 72 IN

## 2023-07-19 DIAGNOSIS — K63.5 POLYP OF DESCENDING COLON, UNSPECIFIED TYPE: ICD-10-CM

## 2023-07-19 DIAGNOSIS — Z80.0 FAMILY HISTORY OF COLON CANCER: Primary | ICD-10-CM

## 2023-07-19 PROCEDURE — 45380 COLONOSCOPY AND BIOPSY: CPT | Mod: PT | Performed by: SURGERY

## 2023-07-19 PROCEDURE — 88305 TISSUE EXAM BY PATHOLOGIST: CPT

## 2023-07-19 PROCEDURE — 258N000003 HC RX IP 258 OP 636: Performed by: SURGERY

## 2023-07-19 PROCEDURE — 250N000009 HC RX 250

## 2023-07-19 PROCEDURE — 45380 COLONOSCOPY AND BIOPSY: CPT

## 2023-07-19 PROCEDURE — 250N000011 HC RX IP 250 OP 636

## 2023-07-19 PROCEDURE — 999N000010 HC STATISTIC ANES STAT CODE-CRNA PER MINUTE: Performed by: SURGERY

## 2023-07-19 RX ORDER — ONDANSETRON 2 MG/ML
4 INJECTION INTRAMUSCULAR; INTRAVENOUS
Status: DISCONTINUED | OUTPATIENT
Start: 2023-07-19 | End: 2023-07-19 | Stop reason: HOSPADM

## 2023-07-19 RX ORDER — NALOXONE HYDROCHLORIDE 0.4 MG/ML
0.4 INJECTION, SOLUTION INTRAMUSCULAR; INTRAVENOUS; SUBCUTANEOUS
Status: DISCONTINUED | OUTPATIENT
Start: 2023-07-19 | End: 2023-07-19 | Stop reason: HOSPADM

## 2023-07-19 RX ORDER — ONDANSETRON 2 MG/ML
4 INJECTION INTRAMUSCULAR; INTRAVENOUS EVERY 6 HOURS PRN
Status: DISCONTINUED | OUTPATIENT
Start: 2023-07-19 | End: 2023-07-19 | Stop reason: HOSPADM

## 2023-07-19 RX ORDER — LIDOCAINE 40 MG/G
CREAM TOPICAL
Status: DISCONTINUED | OUTPATIENT
Start: 2023-07-19 | End: 2023-07-19 | Stop reason: HOSPADM

## 2023-07-19 RX ORDER — ONDANSETRON 4 MG/1
4 TABLET, ORALLY DISINTEGRATING ORAL EVERY 6 HOURS PRN
Status: DISCONTINUED | OUTPATIENT
Start: 2023-07-19 | End: 2023-07-19 | Stop reason: HOSPADM

## 2023-07-19 RX ORDER — NALOXONE HYDROCHLORIDE 0.4 MG/ML
0.2 INJECTION, SOLUTION INTRAMUSCULAR; INTRAVENOUS; SUBCUTANEOUS
Status: DISCONTINUED | OUTPATIENT
Start: 2023-07-19 | End: 2023-07-19 | Stop reason: HOSPADM

## 2023-07-19 RX ORDER — PROPOFOL 10 MG/ML
INJECTION, EMULSION INTRAVENOUS CONTINUOUS PRN
Status: DISCONTINUED | OUTPATIENT
Start: 2023-07-19 | End: 2023-07-19

## 2023-07-19 RX ORDER — LIDOCAINE HYDROCHLORIDE 20 MG/ML
INJECTION, SOLUTION INFILTRATION; PERINEURAL PRN
Status: DISCONTINUED | OUTPATIENT
Start: 2023-07-19 | End: 2023-07-19

## 2023-07-19 RX ORDER — PROCHLORPERAZINE MALEATE 5 MG
5 TABLET ORAL EVERY 6 HOURS PRN
Status: DISCONTINUED | OUTPATIENT
Start: 2023-07-19 | End: 2023-07-19 | Stop reason: HOSPADM

## 2023-07-19 RX ORDER — SODIUM CHLORIDE, SODIUM LACTATE, POTASSIUM CHLORIDE, CALCIUM CHLORIDE 600; 310; 30; 20 MG/100ML; MG/100ML; MG/100ML; MG/100ML
INJECTION, SOLUTION INTRAVENOUS CONTINUOUS
Status: DISCONTINUED | OUTPATIENT
Start: 2023-07-19 | End: 2023-07-19 | Stop reason: HOSPADM

## 2023-07-19 RX ORDER — PROPOFOL 10 MG/ML
INJECTION, EMULSION INTRAVENOUS PRN
Status: DISCONTINUED | OUTPATIENT
Start: 2023-07-19 | End: 2023-07-19

## 2023-07-19 RX ORDER — FLUMAZENIL 0.1 MG/ML
0.2 INJECTION, SOLUTION INTRAVENOUS
Status: DISCONTINUED | OUTPATIENT
Start: 2023-07-19 | End: 2023-07-19 | Stop reason: HOSPADM

## 2023-07-19 RX ADMIN — LIDOCAINE HYDROCHLORIDE 40 MG: 20 INJECTION, SOLUTION INFILTRATION; PERINEURAL at 10:15

## 2023-07-19 RX ADMIN — PROPOFOL 50 MG: 10 INJECTION, EMULSION INTRAVENOUS at 10:15

## 2023-07-19 RX ADMIN — PROPOFOL 175 MCG/KG/MIN: 10 INJECTION, EMULSION INTRAVENOUS at 10:15

## 2023-07-19 RX ADMIN — SODIUM CHLORIDE, POTASSIUM CHLORIDE, SODIUM LACTATE AND CALCIUM CHLORIDE: 600; 310; 30; 20 INJECTION, SOLUTION INTRAVENOUS at 09:52

## 2023-07-19 ASSESSMENT — ACTIVITIES OF DAILY LIVING (ADL)
ADLS_ACUITY_SCORE: 35
ADLS_ACUITY_SCORE: 35

## 2023-07-19 NOTE — ANESTHESIA PREPROCEDURE EVALUATION
Anesthesia Pre-Procedure Evaluation    Patient: Anjel Payton   MRN: [id number] : 1955        Procedure : Procedure(s):  Colonoscopy          Past Medical History:   Diagnosis Date     Acute gastric ulcer due to Helicobacter pylori      Essential (primary) hypertension     No Comments Provided     Lower urinary tract symptoms (LUTS)      Tubular adenoma       Past Surgical History:   Procedure Laterality Date     COLONOSCOPY      10/5/09     COLONOSCOPY N/A 2018    F/U   tubular adenomas     ESOPHAGOSCOPY, GASTROSCOPY, DUODENOSCOPY (EGD), COMBINED N/A 2019    antral ulcer     HERNIA REPAIR, UMBILICAL  2009     LAPAROSCOPIC CHOLECYSTECTOMY  2017     RELEASE CARPAL TUNNEL Right       No Known Allergies   Social History     Tobacco Use     Smoking status: Former     Types: Cigarettes     Quit date: 10/25/1975     Years since quittin.7     Smokeless tobacco: Former     Quit date: 10/25/1998   Substance Use Topics     Alcohol use: Yes     Alcohol/week: 0.8 standard drinks of alcohol     Comment: 1 weekly       Wt Readings from Last 1 Encounters:   23 83.5 kg (184 lb)        Anesthesia Evaluation   Pt has had prior anesthetic.     No history of anesthetic complications       ROS/MED HX  ENT/Pulmonary:  - neg pulmonary ROS     Neurologic:  - neg neurologic ROS     Cardiovascular:  - neg cardiovascular ROS   (+) hypertension-----    METS/Exercise Tolerance: >4 METS    Hematologic:  - neg hematologic  ROS     Musculoskeletal:  - neg musculoskeletal ROS     GI/Hepatic:  - neg GI/hepatic ROS     Renal/Genitourinary:  - neg Renal ROS     Endo:  - neg endo ROS     Psychiatric/Substance Use:  - neg psychiatric ROS     Infectious Disease:  - neg infectious disease ROS     Malignancy:  - neg malignancy ROS     Other:  - neg other ROS          Physical Exam    Airway        Mallampati: I   TM distance: > 3 FB   Neck ROM: full     Respiratory Devices and Support         Dental       (+) Minor  Abnormalities - some fillings, tiny chips      Cardiovascular   cardiovascular exam normal       Rhythm and rate: regular and normal     Pulmonary   pulmonary exam normal        breath sounds clear to auscultation           OUTSIDE LABS:  CBC:   Lab Results   Component Value Date    WBC 3.2 (L) 04/27/2023    WBC 3.1 (L) 11/27/2018    HGB 14.3 04/27/2023    HGB 14.6 11/27/2018    HCT 42.6 04/27/2023    HCT 43.1 11/27/2018     (L) 04/27/2023     11/27/2018     BMP:   Lab Results   Component Value Date     04/27/2023     03/20/2023    POTASSIUM 3.8 04/27/2023    POTASSIUM 3.9 03/20/2023    CHLORIDE 100 04/27/2023    CHLORIDE 97 (L) 03/20/2023    CO2 31 (H) 04/27/2023    CO2 28 03/20/2023    BUN 22.3 04/27/2023    BUN 24.0 (H) 03/20/2023    CR 0.65 (L) 04/27/2023    CR 0.80 03/20/2023     (H) 04/27/2023    GLC 97 03/20/2023     COAGS: No results found for: PTT, INR, FIBR  POC: No results found for: BGM, HCG, HCGS  HEPATIC:   Lab Results   Component Value Date    ALBUMIN 4.5 03/20/2023    PROTTOTAL 8.1 03/20/2023    ALT 27 03/20/2023    AST 35 03/20/2023    ALKPHOS 51 03/20/2023    BILITOTAL 1.0 03/20/2023     OTHER:   Lab Results   Component Value Date    ZURI 9.5 04/27/2023    AMYLASE 73 01/25/2017       Anesthesia Plan    ASA Status:  2   NPO Status:  NPO Appropriate    Anesthesia Type: MAC.     - Reason for MAC: straight local not clinically adequate   Induction: Intravenous.           Consents    Anesthesia Plan(s) and associated risks, benefits, and realistic alternatives discussed. Questions answered and patient/representative(s) expressed understanding.     - Discussed: Risks, Benefits and Alternatives for BOTH SEDATION and the PROCEDURE were discussed     - Discussed with:  Patient         Postoperative Care            Comments:                TIMA HUSTON CRNA

## 2023-07-19 NOTE — OP NOTE
PROCEDURE NOTE    SURGEON: Morena Dejesus MD.    PRE-OP DIAGNOSIS:  Screening Colonoscopy      POST-OP DIAGNOSIS: colon polyp     Location: Descending colon Size: 0.2 cm  Removed:  Y    PROCEDURE:  Colonoscopy with polypectomy cold forceps    ESTIMATEDBLOOD LOSS: none    COMPLICATIONS:  None    SPECIMEN:  Descending colon polyp    ANESTHESIA:  See anesthesia note    INDICATION FOR THE PROCEDURE: The patient is a 68 year old male. The patient has no complaints. I explained to the patient the risks, benefits and alternatives to screening colonoscopy for evaluating the colon for colon polyps and colon cancer. We specifically discussed the risks of bleeding, infection, perforation, potential inability to reach the cecum and the risks of sedation. The patient's questions were answered and the patient wished to proceed. Informed consent paperwork was completed.    PROCEDURE: The patient was taken to the endoscopy suite. Appropriate monitors were attached. The patient was placed in the left lateral decubitus position.Timeout was performed confirming the patient's identity and procedure to be performed. After appropriate sedation was confirmed, digital rectal exam was performed. There was normal tone and no gross abnormality was noted. The lubricated colonoscope was introduced into the anus the colon was insufflated with air. The prep quality was adequate. Under direct visualization the scope was advanced to the cecum. The ileocecal valve was intubated and the terminal ileum inspected. No gross abnormality was noted. The scope was withdrawn back into the cecum. The mucosa of colon was inspected while withdrawing the scope. A small sessile polyp was noted in the descending colon and removed with cold forceps. The scope was retroflexed in the rectum and the anorectal junction was inspected. No abnormalities were noted. The scope was returned to a neutral position and the colon was decompressed. The scope was removed. The  patient tolerated the procedure with no immediately apparent complication. The patient was taken to recovery in stable condition.  FOLLOW UP:  RECOMMEND high fiber diet, follow up: will call with pathology results.

## 2023-07-19 NOTE — ANESTHESIA POSTPROCEDURE EVALUATION
Patient: Anjel Payton    Procedure: Procedure(s):  COLONOSCOPY, WITH POLYPECTOMY       Anesthesia Type:  MAC    Note:  Disposition: Outpatient   Postop Pain Control: Uneventful            Sign Out: Well controlled pain   PONV: No   Neuro/Psych: Uneventful            Sign Out: Acceptable/Baseline neuro status   Airway/Respiratory: Uneventful            Sign Out: Acceptable/Baseline resp. status   CV/Hemodynamics: Uneventful            Sign Out: Acceptable CV status; No obvious hypovolemia; No obvious fluid overload   Other NRE: NONE   DID A NON-ROUTINE EVENT OCCUR? No           Last vitals:  Vitals Value Taken Time   /76 07/19/23 1115   Temp 98.2  F (36.8  C) 07/19/23 1047   Pulse 60 07/19/23 1115   Resp 16 07/19/23 1047   SpO2 95 % 07/19/23 1127   Vitals shown include unvalidated device data.    Electronically Signed By: TIMA Mcdaniel CRNA  July 19, 2023  11:49 AM

## 2023-07-19 NOTE — H&P
PRE-PROCEDURE NOTE    CHIEF COMPLAINT / REASON FORPROCEDURE:  Need for screening colonoscopy.    PERTINENT HISTORY   Patient with no complaints. Previous colonoscopy 2018-tubular adenomas. No diarrhea, constipation, abdominal pain or rectal bleeding. Family history of colon cancer mother.  Past Medical History:   Diagnosis Date     Acute gastric ulcer due to Helicobacter pylori      Essential (primary) hypertension     No Comments Provided     Lower urinary tract symptoms (LUTS)      Tubular adenoma      Past Surgical History:   Procedure Laterality Date     COLONOSCOPY      10/5/09     COLONOSCOPY N/A 07/16/2018    F/U  2023 tubular adenomas     ESOPHAGOSCOPY, GASTROSCOPY, DUODENOSCOPY (EGD), COMBINED N/A 04/29/2019    antral ulcer     HERNIA REPAIR, UMBILICAL  2009     LAPAROSCOPIC CHOLECYSTECTOMY  01/2017     RELEASE CARPAL TUNNEL Right      Other:  None  Bleeding tendencies:  No    Relevant Family History:  none    Relevant Social History:  none    A relevant review of systems was performed and was Negative.    ALLERGIES/SENSITIVITIES: No Known Allergies     CURRENTMEDICATIONS:    No current facility-administered medications on file prior to encounter.  bisacodyl (DULCOLAX) 5 MG EC tablet, Take as directed by colonoscopy prep instructions  hydrochlorothiazide (HYDRODIURIL) 12.5 MG tablet, TAKE 1 TABLET BY MOUTH EVERY DAY  lisinopril (ZESTRIL) 20 MG tablet, TAKE 1 TABLET BY MOUTH EVERY DAY  Multiple Vitamin (MULTI-VITAMINS) TABS, Take 1 tablet by mouth daily      No current facility-administered medications for this encounter.       PRE-SEDATION ASSESSMENT:    There were no vitals taken for this visit.  Lung Exam:  Normal  Heart Exam:  Normal    Comment(s):      IMPRESSION:  Need for screening colonoscopy.    PLAN:  I discussed screening colonoscopy with the patient.

## 2023-07-19 NOTE — ANESTHESIA CARE TRANSFER NOTE
Patient: Anjel Payton    Procedure: Procedure(s):  COLONOSCOPY, WITH POLYPECTOMY       Diagnosis: History of colon polyps [Z86.010]  Diagnosis Additional Information: No value filed.    Anesthesia Type:   MAC     Note:    Oropharynx: oropharynx clear of all foreign objects and spontaneously breathing  Level of Consciousness: awake and drowsy  Oxygen Supplementation: room air    Independent Airway: airway patency satisfactory and stable  Dentition: dentition unchanged  Vital Signs Stable: post-procedure vital signs reviewed and stable  Report to RN Given: handoff report given  Patient transferred to: Phase II    Handoff Report: Identifed the Patient, Identified the Reponsible Provider, Reviewed the pertinent medical history, Discussed the surgical course, Reviewed Intra-OP anesthesia mangement and issues during anesthesia, Set expectations for post-procedure period and Allowed opportunity for questions and acknowledgement of understanding      Vitals:  Vitals Value Taken Time   BP     Temp     Pulse     Resp     SpO2         Electronically Signed By: TIMA Mcdaniel CRNA  July 19, 2023  10:49 AM

## 2023-07-19 NOTE — OR NURSING
Anjel has been discharged to home at 1145 via ambulatory accompanied by SHERI Carpio and Kathie, sister.    Written discharge instructions were provided to Anjel.  Prescriptions were none.  Patient states their pain is 0/10, and denies any nausea or dizziness upon discharge.    Patient and adult caring for them verbalize understanding of discharge instructions including no driving until tomorrow and no longer taking narcotic pain medications - no operating mechanical equipment and no making any important decisions.They understand reason for discharge, and necessary follow-up appointments.

## 2023-07-19 NOTE — DISCHARGE INSTRUCTIONS
Procedure you had done: colonoscopy with removal of polyp  Your health care provider is:  Sanjeev Burns  Your surgeon is Dr. Morena Lew.   Please call your health care provider or surgeon at [phone] if:    - you feel you are getting worse or having an increase in problems    - fever greater than 101 degrees  - increasing shortness of breath or chest pain  - any signs of infection (increasing redness, swelling, tenderness, warmth, change in appearance, or  increased drainage)  - blood in your urine or stool  - coughing or vomiting blood  - nausea (upset stomach) and vomiting and/or diarrhea that will not stop  - severe pain that is not relieved by medicine, rest or ice  You have had medications for sedation. Please be aware that this can cause drowsiness and impaired judgment for up to 24 hours after your procedure. Do not drive, operate power tools or drink alcohol for 24 hours.  If samples were taken-you will get a phone call and a letter with your results in the next 7-10 days. If you don't get results, please call and let us know!

## 2023-07-21 ENCOUNTER — OFFICE VISIT (OUTPATIENT)
Dept: INTERNAL MEDICINE | Facility: OTHER | Age: 68
End: 2023-07-21
Attending: STUDENT IN AN ORGANIZED HEALTH CARE EDUCATION/TRAINING PROGRAM
Payer: COMMERCIAL

## 2023-07-21 VITALS
HEIGHT: 71 IN | HEART RATE: 64 BPM | OXYGEN SATURATION: 98 % | BODY MASS INDEX: 25.48 KG/M2 | DIASTOLIC BLOOD PRESSURE: 86 MMHG | RESPIRATION RATE: 16 BRPM | SYSTOLIC BLOOD PRESSURE: 132 MMHG | WEIGHT: 182 LBS | TEMPERATURE: 97 F

## 2023-07-21 DIAGNOSIS — M25.512 CHRONIC LEFT SHOULDER PAIN: Primary | ICD-10-CM

## 2023-07-21 DIAGNOSIS — G89.29 CHRONIC LEFT SHOULDER PAIN: Primary | ICD-10-CM

## 2023-07-21 LAB
PATH REPORT.COMMENTS IMP SPEC: NORMAL
PATH REPORT.FINAL DX SPEC: NORMAL
PATH REPORT.RELEVANT HX SPEC: NORMAL
PHOTO IMAGE: NORMAL

## 2023-07-21 PROCEDURE — 99213 OFFICE O/P EST LOW 20 MIN: CPT | Performed by: STUDENT IN AN ORGANIZED HEALTH CARE EDUCATION/TRAINING PROGRAM

## 2023-07-21 PROCEDURE — G0463 HOSPITAL OUTPT CLINIC VISIT: HCPCS

## 2023-07-21 ASSESSMENT — ENCOUNTER SYMPTOMS: BACK PAIN: 1

## 2023-07-21 ASSESSMENT — PAIN SCALES - GENERAL: PAINLEVEL: MILD PAIN (3)

## 2023-07-21 NOTE — PROGRESS NOTES
Assessment & Plan         ICD-10-CM    1. Chronic left shoulder pain  M25.512 MR Shoulder Left w/o Contrast    G89.29         Shoulder pain: Suspect musculoskeletal no abnormality on CT PE study.  Has been ongoing for nearly 4 months despite physical therapy and supportive measures.  We will obtain an MRI of the shoulder and treat as appropriately pending MRI results.  Did have some improvement with prednisone but discussed that this is not a long-term medication.  He also has some minimal elevation in D-dimer and CRP which could be nonspecific markers of general inflammation.  If no significant findings on MRI could consider referral to rheumatology for work-up.        No follow-ups on file.    Daryn Cisneros MD  [facility]      Subjective   Anjel is a 68 year old, presenting for the following health issues:  Back Pain (Back pain started about 4-2023   Est care   and lab review )        4/10/2023     8:57 AM   Additional Questions   Roomed by LINN Salazar     Back Pain     History of Present Illness       Back Pain:  He presents for follow up of back pain. Patient's back pain is a recurring problem.  Location of back pain:  Left shoulder  Description of back pain: dull ache  Back pain spreads: left shoulder    Since patient first noticed back pain, pain is: unchanged  Does back pain interfere with his job:  No      Reason for visit:  New    He eats 2-3 servings of fruits and vegetables daily.He consumes 0 sweetened beverage(s) daily.He exercises with enough effort to increase his heart rate 60 or more minutes per day.  He exercises with enough effort to increase his heart rate 5 days per week.   He is taking medications regularly.       Right upper back and right shoulder pain started about 4-2023  Est Care and review labs    Went to the ED in April and d. Dimer was slightly elevated.   Was seen in the emergency room due to back pain.     Saw sports medicine. Prednisone given and felt great with  "this and his knees.   Has been using ibuprofen.   Has seen chiropractor   Has seen PT.   When active it doesn't bother him.   When sitting it worsens.   Has muscle tightness. May even radiate down to forearm.       Colonoscopy 7/19.  Awaiting pathology results     Patient also had an elevated D-dimer in the emergency department and we discussed what this means.  He is currently short of breath.  No peripheral edema or swelling.  Discussed that also had a negative CT PE study in the ED.        Review of Systems   Musculoskeletal: Positive for back pain.            Objective    /86 (BP Location: Right arm, Patient Position: Sitting, Cuff Size: Adult Regular)   Pulse 64   Temp 97  F (36.1  C) (Temporal)   Resp 16   Ht 1.803 m (5' 11\")   Wt 82.6 kg (182 lb)   SpO2 98%   BMI 25.38 kg/m    Body mass index is 25.38 kg/m .  Physical Exam   General: Pleasant 68-year-old man sitting clinic no acute distress  MSK full range of motion of the left shoulder some pain on deep palpation of the paraspinous versus the rhomboid muscles on the left shoulder.    Reviewed previous work-up in the ED and with Dr. Almonte and sports medicine.                "

## 2023-07-21 NOTE — NURSING NOTE
"Chief Complaint   Patient presents with     Back Pain     Back pain started about 4-2023   Est care   and lab review        Medication reconciliation completed.    FOOD SECURITY SCREENING QUESTIONS:    The next two questions are to help us understand your food security.  If you are feeling you need any assistance in this area, we have resources available to support you today.    Hunger Vital Signs:  Within the past 12 months we worried whether our food would run out before we got money to buy more. Never  Within the past 12 months the food we bought just didn't last and we didn't have money to get more. Never    Initial /86 (BP Location: Right arm, Patient Position: Sitting, Cuff Size: Adult Regular)   Pulse 64   Temp 97  F (36.1  C) (Temporal)   Resp 16   Ht 1.803 m (5' 11\")   Wt 82.6 kg (182 lb)   SpO2 98%   BMI 25.38 kg/m   Estimated body mass index is 25.38 kg/m  as calculated from the following:    Height as of this encounter: 1.803 m (5' 11\").    Weight as of this encounter: 82.6 kg (182 lb).       Cara Gee LPN .......  7/21/2023  10:50 AM  "

## 2023-07-24 NOTE — RESULT ENCOUNTER NOTE
Sheryl/Zayra/Leigh Ann-Please call patient and let them know the colon polyp that was removed was a tubular adenoma. This type of polyp has a low risk of being associated with a colon cancer. Follow up colonoscopy is recommended in 5 years to check for new polyps. High fiber diet is recommended for colon health. Patient should call with questions or concerns. Thanks!

## 2023-07-31 ENCOUNTER — TELEPHONE (OUTPATIENT)
Dept: INTERNAL MEDICINE | Facility: OTHER | Age: 68
End: 2023-07-31
Payer: COMMERCIAL

## 2023-07-31 DIAGNOSIS — R29.898 SHOULDER WEAKNESS: ICD-10-CM

## 2023-07-31 DIAGNOSIS — G89.29 CHRONIC LEFT SHOULDER PAIN: Primary | ICD-10-CM

## 2023-07-31 DIAGNOSIS — M25.512 CHRONIC LEFT SHOULDER PAIN: Primary | ICD-10-CM

## 2023-07-31 NOTE — TELEPHONE ENCOUNTER
Patient's insurance denied the MRI. They are requesting more information. Please call patient.     Maria Elena Wallace on 7/31/2023 at 2:52 PM

## 2023-07-31 NOTE — TELEPHONE ENCOUNTER
After verifying patient's name and date of birth, patient was informed that we did receive a fax also for more information.  Message and fax sent to Dr. Cisneros.  Patient was notified this.    Cara Gee LPN....7/31/2023 3:24 PM

## 2023-08-03 ENCOUNTER — TELEPHONE (OUTPATIENT)
Dept: INTERNAL MEDICINE | Facility: OTHER | Age: 68
End: 2023-08-03

## 2023-08-03 NOTE — TELEPHONE ENCOUNTER
Due to chronic shoulder pain and weakness concerning for neurologic impingement an MRI was ordered on 7/21/23 for ongoing shoulder pain and weakness progressing despite ongoing physical therapy. Did have positive liftoff testing on 7/21/23.      With this information will hopefull be approved on reorder.     Daryn Cisneros MD on 8/3/2023 at 6:26 PM

## 2023-08-03 NOTE — TELEPHONE ENCOUNTER
Called and spoke to Patient after verifying last name and date of birth. Verified which insurance the patient is on, Medicare [employer] [employer] in [place]. Called [employer] in [place] [phone], spoke to representative Poornima. Transferred to nurse Danette in review, verified patient's last name and date of birth. Ranjana the nurse states that the request for MRI is being sent for further review due to there not being a specific condition or issue to be ruled out by this MRI. Will route to patient's PCP to review. Can call back the above number and update what MRI would be used to rule out.      Sarah Ruiz RN on 8/3/2023 at 4:25 PM

## 2023-08-04 NOTE — TELEPHONE ENCOUNTER
Janet from [facility] regarding MRI orders she received today from Dr. Cisneros.   The MRI is denied again as there needs to be more notes added per Dr. Cisneros like a peer to peer review.    Cara Gee LPN .......  8/4/2023  10:45 AM

## 2023-08-04 NOTE — TELEPHONE ENCOUNTER
Left message to call back........Dolores Sotomayor LPN.......8/4/2023 10:09 [facility]  [id number]

## 2023-08-07 NOTE — TELEPHONE ENCOUNTER
Janet is out of office today, writer left a message stating that we need to know who Dr. Win can call for a Peer to Peer review?  Tianna Daniels LPN on 8/7/2023 at 2:38 PM

## 2023-08-07 NOTE — TELEPHONE ENCOUNTER
Bello returned a call from Friday 08.03.23    Please call back       Shyla Valentin on 8/7/2023 at 8:11 AM

## 2023-08-07 NOTE — TELEPHONE ENCOUNTER
Insurance rejecting MRI, I recommend following up with sports medicine or orthopedics. I can place referral if needed.     Daryn Cisneros MD on 8/7/2023 at 1:15 PM

## 2023-08-08 NOTE — TELEPHONE ENCOUNTER
Reviewed fax received on this from his insurance. Obtaining MRI to evaluate for rotator cuff tendinopathy and labral tear. No ernestina pain on palpation to be suspicious for fracture. Has attempted physical therapy for several weeks. Pain ongoing for 4 months. Diagnostic test of choice is an MRI in this situation.     Daryn Cisneros MD on 8/8/2023 at 2:19 PM

## 2023-08-09 NOTE — TELEPHONE ENCOUNTER
Called [facility] [phone] to relay the message from Dr. Cisneros regarding the reasoning for patient needing MRI. Spoke to Chris HESTER, states the case was denied and closed. Due to the denial, the patient can either appeal this denial or they can try to order the MRI of the left shoulder again in 60 days after the first denial.    Sarah Ruiz RN on 8/9/2023 at 3:22 PM

## 2023-08-10 NOTE — TELEPHONE ENCOUNTER
Called and left message to have patient call back at EXT: [phone].      Sarah Ruiz RN on 8/10/2023 at 8:16 AM

## 2023-08-21 ENCOUNTER — OFFICE VISIT (OUTPATIENT)
Dept: FAMILY MEDICINE | Facility: OTHER | Age: 68
End: 2023-08-21
Attending: FAMILY MEDICINE
Payer: COMMERCIAL

## 2023-08-21 VITALS
BODY MASS INDEX: 24.92 KG/M2 | OXYGEN SATURATION: 97 % | WEIGHT: 178 LBS | HEIGHT: 71 IN | DIASTOLIC BLOOD PRESSURE: 64 MMHG | HEART RATE: 61 BPM | TEMPERATURE: 98.2 F | SYSTOLIC BLOOD PRESSURE: 122 MMHG | RESPIRATION RATE: 20 BRPM

## 2023-08-21 DIAGNOSIS — K29.50 CHRONIC GASTRITIS WITHOUT BLEEDING, UNSPECIFIED GASTRITIS TYPE: Primary | ICD-10-CM

## 2023-08-21 PROCEDURE — 99213 OFFICE O/P EST LOW 20 MIN: CPT | Performed by: FAMILY MEDICINE

## 2023-08-21 PROCEDURE — G0463 HOSPITAL OUTPT CLINIC VISIT: HCPCS

## 2023-08-21 RX ORDER — LANSOPRAZOLE 30 MG/1
30 CAPSULE, DELAYED RELEASE ORAL DAILY
Qty: 90 CAPSULE | Refills: 3 | Status: SHIPPED | OUTPATIENT
Start: 2023-08-21

## 2023-08-21 RX ORDER — FAMOTIDINE 40 MG/1
40 TABLET, FILM COATED ORAL DAILY
Qty: 90 TABLET | Refills: 3 | Status: SHIPPED | OUTPATIENT
Start: 2023-08-21

## 2023-08-21 ASSESSMENT — PAIN SCALES - GENERAL: PAINLEVEL: NO PAIN (0)

## 2023-08-21 NOTE — NURSING NOTE
Patient here for stomach pain at the xyphoid he has noticed he has been taking an increase in ibuprofen that he stopped. Also some diarrhea. Medication Reconciliation: complete.    Patt Swift LPN  8/21/2023 10:16 AM

## 2023-08-21 NOTE — PROGRESS NOTES
"  SUBJECTIVE:   Anjel Payton is a 68 year old male who presents to clinic today for the following health issues:  Epigastric pain  Patient arrives here for epigastric pain.  Patient reports trouble with his shoulders.  He has been taking ibuprofen on a regular basis although stopped it about 5 days ago and started Tums.  He reports the epigastric pain has improved.  He did get a scope about 5 years ago and was told he had a stomach ulcer.  Gaviscon food and Tums seem to make it better.  No blood in the stool.  No nausea no vomiting.  Patient does report some loose stools    GI Problem    History of Present Illness       Reason for visit:  Belly pain  Symptom onset:  1-2 weeks ago  Symptom intensity:  Moderate  Symptom progression:  Improving  Had these symptoms before:  Yes  Has tried/received treatment for these symptoms:  Yes  Previous treatment was successful:  Yes  Prior treatment description:  Antibiotic  What makes it better:  Food    He eats 2-3 servings of fruits and vegetables daily.He consumes 0 sweetened beverage(s) daily.He exercises with enough effort to increase his heart rate 60 or more minutes per day.  He exercises with enough effort to increase his heart rate 5 days per week.   He is taking medications regularly.            Review of Systems     OBJECTIVE:     /64   Pulse 61   Temp 98.2  F (36.8  C)   Resp 20   Ht 1.803 m (5' 11\")   Wt 80.7 kg (178 lb)   SpO2 97%   BMI 24.83 kg/m    Body mass index is 24.83 kg/m .  Physical Exam  Constitutional:       Appearance: Normal appearance.   Abdominal:      Comments: There is just a slight amount of pain on palpation over the epigastric area.   Neurological:      Mental Status: He is alert.             ASSESSMENT/PLAN:         (K29.50) Chronic gastritis without bleeding, unspecified gastritis type  (primary encounter diagnosis)  Comment: Versus gastric or duodenal ulcer  Plan: LANsoprazole (PREVACID) 30 MG DR capsule,         famotidine " (PEPCID) 40 MG tablet        We will start Prevacid.  Follow-up if not improving        Donny Trivedi MD  [facility]

## 2023-08-21 NOTE — PROGRESS NOTES
Answers submitted by the patient for this visit:  General Questionnaire (Submitted on 8/21/2023)  Chief Complaint: Chronic problems general questions HPI Form  How many servings of fruits and vegetables do you eat daily?: 2-3  On average, how many sweetened beverages do you drink each day (Examples: soda, juice, sweet tea, etc.  Do NOT count diet or artificially sweetened beverages)?: 0  How many minutes a day do you exercise enough to make your heart beat faster?: 60 or more  How many days a week do you exercise enough to make your heart beat faster?: 5  How many days per week do you miss taking your medication?: 0  General Concern (Submitted on 8/21/2023)  Chief Complaint: Chronic problems general questions HPI Form  What is the reason for your visit today?: belly pain  When did your symptoms begin?: 1-2 weeks ago  How would you describe these symptoms?: Moderate  Are your symptoms:: Improving  Have you had these symptoms before?: Yes  Have you tried or received treatment for these symptoms before?: Yes  Did that treatment work? : Yes  Please describe the treatment you had:: antibiotic  Is there anything that makes you feel better?: food

## 2023-08-22 NOTE — TELEPHONE ENCOUNTER
Left message on answering machine for patient to call back.  Craa Gee LPN .......  8/22/2023  4:40 PM

## 2023-08-22 NOTE — TELEPHONE ENCOUNTER
After verifying patient's name and date of birth, Left a message for Janet to return our phone call on Thursday to discuss the below information per Dr. Cisneros.    Cara Gee, LINN....8/22/2023 4:50 PM

## 2023-08-24 NOTE — TELEPHONE ENCOUNTER
After verifying patient's name and date of birth, was able to talk to Mis in radiology regarding the below information.  Janet is on vacation, Mis was able to print off the number Dr. Cisneros needs to call and information.   Cara Gee, LINN....8/24/2023 2:35 PM

## 2023-08-24 NOTE — TELEPHONE ENCOUNTER
I was told previously that this has been rejected and was closed and we cannot try again for several weeks.  Unit RN may try calling to see if there is anything we can do but I am not wasting my time for an hour on the phone with these people.    Daryn Cisneros MD on 8/24/2023 at 4:35 PM

## 2023-09-06 NOTE — TELEPHONE ENCOUNTER
This was rejected 4 weeks ago. Why is it coming up again?    Daryn Cisneros MD on 9/6/2023 at 4:03 PM

## 2023-09-07 NOTE — TELEPHONE ENCOUNTER
We are better off just waiting an additional month and trying a repeat MRI order as a CT will not demonstrate what we need to see.     Daryn Cisneros MD on 9/7/2023 at 3:52 PM

## 2023-12-05 ENCOUNTER — PATIENT OUTREACH (OUTPATIENT)
Dept: GASTROENTEROLOGY | Facility: CLINIC | Age: 68
End: 2023-12-05
Payer: COMMERCIAL

## 2024-02-07 DIAGNOSIS — I10 BENIGN ESSENTIAL HYPERTENSION: ICD-10-CM

## 2024-02-12 RX ORDER — HYDROCHLOROTHIAZIDE 12.5 MG/1
TABLET ORAL
Qty: 90 TABLET | Refills: 4 | Status: SHIPPED | OUTPATIENT
Start: 2024-02-12

## 2024-02-12 RX ORDER — LISINOPRIL 20 MG/1
TABLET ORAL
Qty: 90 TABLET | Refills: 4 | Status: SHIPPED | OUTPATIENT
Start: 2024-02-12

## 2024-02-12 NOTE — TELEPHONE ENCOUNTER
[id number] in [employer] [facility] [place] sent Rx request for the following:      Requested Prescriptions   Pending Prescriptions Disp Refills    hydroCHLOROthiazide (HYDRODIURIL) 12.5 MG tablet [Pharmacy Med Name: HYDROCHLOROTHIAZIDE 12.5 MG TB] 90 tablet 3     Sig: TAKE 1 TABLET BY MOUTH EVERY DAY     Recent Labs   Lab Test 04/27/23  0819   CR 0.65*   Ok to refill medication if creatinine is low   Last Prescription Date:   11/1/22  Last Fill Qty/Refills:         90, R-3        lisinopril (ZESTRIL) 20 MG tablet [Pharmacy Med Name: LISINOPRIL 20 MG TABLET] 90 tablet 3     Sig: TAKE 1 TABLET BY MOUTH EVERY DAY     Recent Labs   Lab Test 04/27/23  0819   CR 0.65*   Ok to refill medication if creatinine is low   Last Prescription Date:   12/5/22  Last Fill Qty/Refills:         90, R-3      ACE Inhibitors (Including Combos) Protocol Failed - 2/12/2024 10:02 AM        Failed - Normal serum creatinine on file in past 12 months     Recent Labs   Lab Test 04/27/23  0819   CR 0.65*   Ok to refill medication if creatinine is low     Last Office Visit:              8/21/23   Future Office visit:           None    Unable to complete prescription refill per RN Medication Refill Policy.     Makayla Xiao RN .............. 2/12/2024  11:09 AM

## 2024-04-27 ENCOUNTER — HEALTH MAINTENANCE LETTER (OUTPATIENT)
Age: 69
End: 2024-04-27

## 2024-04-28 ENCOUNTER — APPOINTMENT (OUTPATIENT)
Dept: GENERAL RADIOLOGY | Facility: OTHER | Age: 69
End: 2024-04-28
Payer: COMMERCIAL

## 2024-04-28 ENCOUNTER — HOSPITAL ENCOUNTER (EMERGENCY)
Facility: OTHER | Age: 69
Discharge: HOME | End: 2024-04-28
Payer: COMMERCIAL

## 2024-04-28 VITALS
OXYGEN SATURATION: 99 % | HEIGHT: 71 IN | RESPIRATION RATE: 16 BRPM | BODY MASS INDEX: 24.5 KG/M2 | WEIGHT: 175 LBS | TEMPERATURE: 97 F | SYSTOLIC BLOOD PRESSURE: 152 MMHG | DIASTOLIC BLOOD PRESSURE: 79 MMHG | HEART RATE: 58 BPM

## 2024-04-28 DIAGNOSIS — S61.258A DOG BITE OF INDEX FINGER, INITIAL ENCOUNTER: ICD-10-CM
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
## 2024-04-28 PROCEDURE — 99284 EMERGENCY DEPT VISIT MOD MDM: CPT

## 2024-04-28 PROCEDURE — 99283 EMERGENCY DEPT VISIT LOW MDM: CPT

## 2024-04-28 PROCEDURE — 73140 X-RAY EXAM OF FINGER(S): CPT | Mod: LT

## 2024-04-28 PROCEDURE — 73140 X-RAY EXAM OF FINGER(S): CPT | Mod: RT,76

## 2024-04-28 ASSESSMENT — ACTIVITIES OF DAILY LIVING (ADL): ADLS_ACUITY_SCORE: 33

## 2024-04-28 ASSESSMENT — ENCOUNTER SYMPTOMS: WOUND: 1

## 2024-04-28 ASSESSMENT — COLUMBIA-SUICIDE SEVERITY RATING SCALE - C-SSRS
1. IN THE PAST MONTH, HAVE YOU WISHED YOU WERE DEAD OR WISHED YOU COULD GO TO SLEEP AND NOT WAKE UP?: NO
6. HAVE YOU EVER DONE ANYTHING, STARTED TO DO ANYTHING, OR PREPARED TO DO ANYTHING TO END YOUR LIFE?: NO
2. HAVE YOU ACTUALLY HAD ANY THOUGHTS OF KILLING YOURSELF IN THE PAST MONTH?: NO

## 2024-04-28 NOTE — ED PROVIDER NOTES
History     Chief Complaint   Patient presents with    Dog Bite     Two small puncture wounds to bilateral second digits     The history is provided by the patient.     Anjel Payton is a 68 year old male with dog bite. He was on a walk today when a big dog bit both hands. He has wounds to bilateral index fingers. Denies any other injuries. C/o pain to bilateral index fingers. He talked to the dog owner who reports the dog is up to date on rabies vaccine. He reported the dog bite to the police. The deputy called him while in ED and has the dog's vaccine paperwork.     Allergies:  No Known Allergies    Problem List:    Patient Active Problem List    Diagnosis Date Noted    Dog bite of index finger, initial encounter 2024     Priority: Medium    Upper back strain, initial encounter 2023     Priority: Medium    Acute gastric ulcer due to Helicobacter pylori      Priority: Medium    Gastroesophageal reflux disease with esophagitis 2019     Priority: Medium    Acute ulcer of  antrum 2019     Priority: Medium    Lower urinary tract symptoms (LUTS) 2018     Priority: Medium    H/O adenomatous polyp of colon 2018     Priority: Medium    Family history of colon cancer mom  62 2018     Priority: Medium    Benign essential hypertension 2018     Priority: Medium    Lumbago 2018     Priority: Medium     Overview:   with L5-S1 disc protrusion, moderate and asymmetric to the right with right S1   nerve rootlet displaced posteriorly      Vegetarian 2014     Priority: Medium        Past Medical History:    Past Medical History:   Diagnosis Date    Acute gastric ulcer due to Helicobacter pylori     Essential (primary) hypertension     Lower urinary tract symptoms (LUTS)     Tubular adenoma        Past Surgical History:    Past Surgical History:   Procedure Laterality Date    COLONOSCOPY      10/5/09    COLONOSCOPY N/A 2018    F/U   tubular adenomas     "COLONOSCOPY N/A 2023    tubular adenoma 5 yr follow up    ESOPHAGOSCOPY, GASTROSCOPY, DUODENOSCOPY (EGD), COMBINED N/A 2019    antral ulcer    HERNIA REPAIR, UMBILICAL  2009    LAPAROSCOPIC CHOLECYSTECTOMY  2017    RELEASE CARPAL TUNNEL Right        Family History:    Family History   Problem Relation Age of Onset    Colon Cancer Mother          age 60 or 61    Diabetes Father         Diabetes, post renal transplant secondary to diabetic complications,    Hypertension Father     Heart Disease Father          of myocardial infarction at age 57    Heart Disease Brother         With bicuspid aortic valve, status post aortic valve replacement    Breast Cancer Sister        Social History:  Marital Status:   [2]  Social History     Tobacco Use    Smoking status: Former     Current packs/day: 0.00     Types: Cigarettes     Quit date: 10/25/1975     Years since quittin.5    Smokeless tobacco: Former     Quit date: 10/25/1998   Vaping Use    Vaping status: Never Used   Substance Use Topics    Alcohol use: Yes     Alcohol/week: 0.8 standard drinks of alcohol     Comment: 1 weekly     Drug use: No     Comment: Drug use: No        Medications:    amoxicillin-clavulanate (AUGMENTIN) 875-125 MG tablet  famotidine (PEPCID) 40 MG tablet  hydroCHLOROthiazide (HYDRODIURIL) 12.5 MG tablet  LANsoprazole (PREVACID) 30 MG DR capsule  lisinopril (ZESTRIL) 20 MG tablet  Multiple Vitamin (MULTI-VITAMINS) TABS          Review of Systems   Skin:  Positive for wound.   All other systems reviewed and are negative.      Physical Exam   BP: (!) 152/79  Pulse: 58  Temp: 97  F (36.1  C)  Resp: 16  Height: 180.3 cm (5' 11\")  Weight: 79.4 kg (175 lb)  SpO2: 99 %      Physical Exam  Vitals and nursing note reviewed.   Constitutional:       General: He is not in acute distress.     Appearance: Normal appearance. He is normal weight. He is not ill-appearing.   HENT:      Head: Normocephalic.      Nose: Nose normal.    "   Mouth/Throat:      Mouth: Mucous membranes are moist.   Eyes:      Conjunctiva/sclera: Conjunctivae normal.   Cardiovascular:      Rate and Rhythm: Normal rate and regular rhythm.   Musculoskeletal:      Right hand: Tenderness present. Normal range of motion.      Left hand: Tenderness present. Normal range of motion.      Comments: Bilateral index fingers with wounds. See image. Tenderness on palpation to proximal left index finger.    Neurological:      Mental Status: He is alert.                  Results for orders placed or performed during the hospital encounter of 04/28/24 (from the past 24 hour(s))   XR Finger Left G/E 2 Views    Narrative    XR FINGER LEFT G/E 2 VIEWS    HISTORY: 68 years Male dog bite puncture wound and pain    COMPARISON:    TECHNIQUE: Right second digit 3 views    FINDINGS: Joint spaces are congruent. Articular surfaces are smooth.  There is soft tissue edema narrowing about the proximal  interphalangeal articulation. There is no evidence of fracture. No  radiopaque foreign bodies are identified.      Impression    IMPRESSION: No evidence of fracture. No evidence of radiopaque foreign  body.    ANNAMARIA BELTRAN MD         SYSTEM ID:  [id number]   XR Finger Right G/E 2 Views    Narrative    XR FINGER RIGHT G/E 2 VIEWS    HISTORY: 68 years Male dog bite puncture wound and pain    COMPARISON: None    TECHNIQUE: Left second digit 3 views    FINDINGS: Joint spaces are congruent. Articular surfaces are smooth.  There is mild joint space narrowing of the proximal and distal  interphalangeal articulations. There is no evidence of fracture. No  radiopaque foreign bodies are identified.      Impression    IMPRESSION: No evidence of fracture or dislocation. No radiopaque  foreign bodies are identified.    ANNAMARIA BELTRAN MD         SYSTEM ID:  [id number]       Medications - No data to display    Assessments & Plan (with Medical Decision Making)  Anjel Payton is a 68 year old male with dog  "bite. He was on a walk today when a big dog bit both hands. He has wounds to bilateral index fingers. Denies any other injuries. C/o pain to bilateral index fingers. He talked to the dog owner who reports the dog is up to date on rabies vaccine. He reported the dog bite to the police. The deputy called him while in ED and has the dog's vaccine paperwork.   VS in the ED. BP (!) 152/79   Pulse 58   Temp 97  F (36.1  C) (Tympanic)   Resp 16   Ht 1.803 m (5' 11\")   Wt 79.4 kg (175 lb)   SpO2 99%   BMI 24.41 kg/m   Bilateral index finger wounds. Left index finger with two puncture wounds. The largest measuring 0.5cm. No bleeding. Swelling to proximal index finger. Pain on palpation to the area. Right index finger with an open wound measuring approximately 1cm. No bleeding. Full ROM to both index fingers. Adequate strength and motor function.   Diagnostics:    X-ray: I independently reviewed XR finger right 2 view and then confirmed by radiology findings noted No evidence of fracture or dislocation. No radiopaque foreign bodies are identified.    I independently reviewed XR finger left 2 views and then confirmed by radiology findings noted No evidence of fracture. No evidence of radiopaque foreign body.    Anjel is a 67 y/o male presents today with wounds to bilateral index fingers after a big dog bit him while on a walk today. He was wearing gloves at the time. There is a 0.5cm puncture wound to left proximal index finger. There is a 1cm avulsion wound to the distal right index finger. Adequate strength and motor function. Sensation is intact. X-rays reveal no fracture or dislocation. He soaked both hands in saline and Hibiclens while in the ED. Last Td/tdap was in 9/15/2020 per [facility]. Given one of his bite wounds is a puncture type wound that is near his joint with swelling will treat him with a prophylactic antibiotic. Augmentin prescription sent to [facility] pharmacy. Return to ED precautions discussed. Verbalizes " understanding of discharge plan.        I have reviewed the nursing notes.    I have reviewed the findings, diagnosis, plan and need for follow up with the patient.    Medical Decision Making  The patient's presentation was of low complexity (2+ clearly self-limited or minor problems).    The patient's evaluation involved:  an assessment requiring an independent historian (see separate area of note for details)  ordering and/or review of 2 test(s) in this encounter (see separate area of note for details)    The patient's management necessitated only low risk treatment.      Discharge Medication List as of 4/28/2024  3:36 PM        START taking these medications    Details   amoxicillin-clavulanate (AUGMENTIN) 875-125 MG tablet Take 1 tablet by mouth 2 times daily for 5 days, Disp-10 tablet, R-0, E-Prescribe             Final diagnoses:   Dog bite of index finger, initial encounter       4/28/2024   [facility] AND \[facility] [place] [facility]\""Karina grosssay, TIMA CNP  04/28/24 1545

## 2024-04-28 NOTE — ED TRIAGE NOTES
"Patient being evaluated today after a dogbite. He states that he was out for a walk, when I dog got loose from a yard and bit him. Two small puncture wounds noted to bilateral second digits. Bleeding is controlled. Patient states that he got a verbal confirmation from the dog owner regarding vaccination status, but no paperwork. Patient contacted law enforcement PTA. BP (!) 152/79   Pulse 58   Temp 97  F (36.1  C) (Tympanic)   Resp 16   Ht 1.803 m (5' 11\")   Wt 79.4 kg (175 lb)   SpO2 99%   BMI 24.41 kg/m         Triage Assessment (Adult)       Row Name 04/28/24 1426          Triage Assessment    Airway WDL WDL        Respiratory WDL    Respiratory WDL WDL        Skin Circulation/Temperature WDL    Skin Circulation/Temperature WDL WDL        Cardiac WDL    Cardiac WDL WDL        Peripheral/Neurovascular WDL    Peripheral Neurovascular WDL WDL        Cognitive/Neuro/Behavioral WDL    Cognitive/Neuro/Behavioral WDL WDL                     "

## 2024-04-28 NOTE — DISCHARGE INSTRUCTIONS
Augmentin (antibiotic) twice a day x 5 days. Take with food.     Prescription sent to [facility].     Keep area clean and dry. Do not submerge in pool, hot tub or dishwater.     Please return to the ED if you experience, fever, redness, swelling, or drainage (pus), or any new or worsening concerns.

## 2024-05-15 DIAGNOSIS — M25.512 LEFT SHOULDER PAIN, UNSPECIFIED CHRONICITY: Primary | ICD-10-CM

## 2024-05-20 ENCOUNTER — HOSPITAL ENCOUNTER (OUTPATIENT)
Dept: GENERAL RADIOLOGY | Facility: OTHER | Age: 69
Discharge: HOME | End: 2024-05-20
Attending: ORTHOPAEDIC SURGERY
Payer: COMMERCIAL

## 2024-05-20 ENCOUNTER — OFFICE VISIT (OUTPATIENT)
Dept: ORTHOPEDICS | Facility: OTHER | Age: 69
End: 2024-05-20
Attending: ORTHOPAEDIC SURGERY
Payer: COMMERCIAL

## 2024-05-20 VITALS — HEART RATE: 54 BPM | OXYGEN SATURATION: 97 %

## 2024-05-20 DIAGNOSIS — M25.512 LEFT SHOULDER PAIN, UNSPECIFIED CHRONICITY: ICD-10-CM

## 2024-05-20 DIAGNOSIS — M25.512 LEFT SHOULDER PAIN, UNSPECIFIED CHRONICITY: Primary | ICD-10-CM

## 2024-05-20 PROCEDURE — G0463 HOSPITAL OUTPT CLINIC VISIT: HCPCS

## 2024-05-20 PROCEDURE — 99203 OFFICE O/P NEW LOW 30 MIN: CPT | Performed by: ORTHOPAEDIC SURGERY

## 2024-05-20 PROCEDURE — 73030 X-RAY EXAM OF SHOULDER: CPT | Mod: LT

## 2024-05-20 ASSESSMENT — PAIN SCALES - GENERAL: PAINLEVEL: NO PAIN (1)

## 2024-05-20 NOTE — PROGRESS NOTES
Subjective:    68-year-old gentleman with left shoulder pain for better than a year.  He states it is worse at night and he notes no injury.  He has had prednisone for this and he has had physical therapy which helped a little bit but he states that working out at [employer] has been more effective at taking care of the shoulder pain.  Regardless shoulder pain has not completely resolved.  He states that a year ago he had pain in his chest just anterior to the left shoulder as well as pain around his shoulder blade.  He has seen chiropractors as well as primary care on this and nothing is really worked to take care of the pain.    Objective:    On examination today he has scapular dyskinesis of his left shoulder.  He has a old shoulder separation on the right side with prominence of the distal clavicle.  He has full range of motion of the shoulder with good strength in the rotator cuff in all planes.  He is mildly tender to palpation at Codman's point, nontender to palpation at the AC joint nontender to palpation of the bicipital groove.  Otherwise neurovascularly intact in bilateral upper extremities    X-ray examination shows relatively normal shoulder.  From the humeral distance appears to be well-maintained.  There is minimal glenohumeral arthritis and there is minimal changes within the greater tuberosity or the undersurface of the acromion that would be consistent with rotator cuff dysfunction.    Assessment:    68-year-old gentleman with internal derangement of the shoulder.  I am specifically suspicious of a SLAP tear given the length of time that he has had this problem, his history of complaints and the noticeable scapular dyskinesis.    Plan:    At this point I would recommend a MRI of the left shoulder.  He is already exhausted all conservative measures for treating this I am concerned that if it goes on much longer this will cause more damage.  I will call him with results of the MRI.

## 2024-05-21 ENCOUNTER — HOSPITAL ENCOUNTER (OUTPATIENT)
Dept: MRI IMAGING | Facility: OTHER | Age: 69
Discharge: HOME | End: 2024-05-21
Attending: ORTHOPAEDIC SURGERY | Admitting: ORTHOPAEDIC SURGERY
Payer: COMMERCIAL

## 2024-05-21 DIAGNOSIS — M25.512 LEFT SHOULDER PAIN, UNSPECIFIED CHRONICITY: ICD-10-CM

## 2024-05-21 PROCEDURE — 73221 MRI JOINT UPR EXTREM W/O DYE: CPT | Mod: LT

## 2024-06-12 ENCOUNTER — HOSPITAL ENCOUNTER (OUTPATIENT)
Dept: GENERAL RADIOLOGY | Facility: OTHER | Age: 69
Discharge: HOME | End: 2024-06-12
Payer: COMMERCIAL

## 2024-06-12 ENCOUNTER — OFFICE VISIT (OUTPATIENT)
Dept: FAMILY MEDICINE | Facility: OTHER | Age: 69
End: 2024-06-12
Payer: COMMERCIAL

## 2024-06-12 VITALS
SYSTOLIC BLOOD PRESSURE: 148 MMHG | DIASTOLIC BLOOD PRESSURE: 80 MMHG | HEART RATE: 55 BPM | BODY MASS INDEX: 24.22 KG/M2 | OXYGEN SATURATION: 97 % | RESPIRATION RATE: 18 BRPM | TEMPERATURE: 97.9 F | WEIGHT: 173 LBS | HEIGHT: 71 IN

## 2024-06-12 DIAGNOSIS — M21.611 BUNION, RIGHT: ICD-10-CM

## 2024-06-12 DIAGNOSIS — M79.671 RIGHT FOOT PAIN: ICD-10-CM

## 2024-06-12 DIAGNOSIS — S90.811A INFECTED ABRASION OF RIGHT FOOT, INITIAL ENCOUNTER: Primary | ICD-10-CM

## 2024-06-12 DIAGNOSIS — L08.9 INFECTED ABRASION OF RIGHT FOOT, INITIAL ENCOUNTER: Primary | ICD-10-CM

## 2024-06-12 DIAGNOSIS — L89.896 PRESSURE INJURY OF DEEP TISSUE OF OTHER SITE: ICD-10-CM

## 2024-06-12 DIAGNOSIS — L89.892 PRESSURE INJURY OF RIGHT FOOT, STAGE 2 (H): ICD-10-CM

## 2024-06-12 DIAGNOSIS — I10 BENIGN ESSENTIAL HYPERTENSION: ICD-10-CM

## 2024-06-12 LAB
ANION GAP SERPL CALCULATED.3IONS-SCNC: 7 MMOL/L (ref 7–15)
BASOPHILS # BLD AUTO: 0 10E3/UL (ref 0–0.2)
BASOPHILS NFR BLD AUTO: 1 %
BUN SERPL-MCNC: 18.9 MG/DL (ref 8–23)
CALCIUM SERPL-MCNC: 10 MG/DL (ref 8.8–10.2)
CHLORIDE SERPL-SCNC: 102 MMOL/L (ref 98–107)
CREAT SERPL-MCNC: 0.64 MG/DL (ref 0.67–1.17)
DEPRECATED HCO3 PLAS-SCNC: 29 MMOL/L (ref 22–29)
EGFRCR SERPLBLD CKD-EPI 2021: >90 ML/MIN/1.73M2
EOSINOPHIL # BLD AUTO: 0 10E3/UL (ref 0–0.7)
EOSINOPHIL NFR BLD AUTO: 1 %
ERYTHROCYTE [DISTWIDTH] IN BLOOD BY AUTOMATED COUNT: 13.6 % (ref 10–15)
GLUCOSE SERPL-MCNC: 105 MG/DL (ref 70–99)
HBA1C MFR BLD: 5.1 % (ref 4–6.2)
HCT VFR BLD AUTO: 41.7 % (ref 40–53)
HGB BLD-MCNC: 13.6 G/DL (ref 13.3–17.7)
IMM GRANULOCYTES # BLD: 0 10E3/UL
IMM GRANULOCYTES NFR BLD: 1 %
LYMPHOCYTES # BLD AUTO: 2 10E3/UL (ref 0.8–5.3)
LYMPHOCYTES NFR BLD AUTO: 48 %
MCH RBC QN AUTO: 29.9 PG (ref 26.5–33)
MCHC RBC AUTO-ENTMCNC: 32.6 G/DL (ref 31.5–36.5)
MCV RBC AUTO: 92 FL (ref 78–100)
MONOCYTES # BLD AUTO: 0.5 10E3/UL (ref 0–1.3)
MONOCYTES NFR BLD AUTO: 12 %
NEUTROPHILS # BLD AUTO: 1.6 10E3/UL (ref 1.6–8.3)
NEUTROPHILS NFR BLD AUTO: 39 %
NRBC # BLD AUTO: 0 10E3/UL
NRBC BLD AUTO-RTO: 0 /100
PLATELET # BLD AUTO: 176 10E3/UL (ref 150–450)
POTASSIUM SERPL-SCNC: 4.6 MMOL/L (ref 3.4–5.3)
RBC # BLD AUTO: 4.55 10E6/UL (ref 4.4–5.9)
SODIUM SERPL-SCNC: 138 MMOL/L (ref 135–145)
WBC # BLD AUTO: 4.1 10E3/UL (ref 4–11)

## 2024-06-12 PROCEDURE — 99214 OFFICE O/P EST MOD 30 MIN: CPT

## 2024-06-12 PROCEDURE — 83036 HEMOGLOBIN GLYCOSYLATED A1C: CPT | Mod: ZL

## 2024-06-12 PROCEDURE — 80048 BASIC METABOLIC PNL TOTAL CA: CPT | Mod: ZL

## 2024-06-12 PROCEDURE — G0463 HOSPITAL OUTPT CLINIC VISIT: HCPCS

## 2024-06-12 PROCEDURE — 73630 X-RAY EXAM OF FOOT: CPT | Mod: RT

## 2024-06-12 PROCEDURE — 85025 COMPLETE CBC W/AUTO DIFF WBC: CPT | Mod: ZL

## 2024-06-12 PROCEDURE — 36415 COLL VENOUS BLD VENIPUNCTURE: CPT | Mod: ZL

## 2024-06-12 RX ORDER — CEFDINIR 300 MG/1
300 CAPSULE ORAL 2 TIMES DAILY
Qty: 10 CAPSULE | Refills: 0 | Status: SHIPPED | OUTPATIENT
Start: 2024-06-12 | End: 2024-06-17

## 2024-06-12 ASSESSMENT — PAIN SCALES - GENERAL: PAINLEVEL: MILD PAIN (3)

## 2024-06-12 NOTE — NURSING NOTE
"Chief Complaint   Patient presents with    Foot Problems     Sore on bunion on right foot     Patient here for a sore on bunion on right foot x12 days that is not healing and is starting to swell.     Initial BP (!) 142/70   Pulse 55   Temp 97.9  F (36.6  C) (Tympanic)   Resp 18   Ht 1.803 m (5' 11\")   Wt 78.5 kg (173 lb)   SpO2 97%   BMI 24.13 kg/m   Estimated body mass index is 24.13 kg/m  as calculated from the following:    Height as of this encounter: 1.803 m (5' 11\").    Weight as of this encounter: 78.5 kg (173 lb).  Medication Review: complete    The next two questions are to help us understand your food security.  If you are feeling you need any assistance in this area, we have resources available to support you today.          6/12/2024   SDOH- Food Insecurity   Within the past 12 months, did you worry that your food would run out before you got money to buy more? N   Within the past 12 months, did the food you bought just not last and you didn t have money to get more? N         Health Care Directive:  Patient does not have a Health Care Directive or Living Will: Discussed advance care planning with patient; however, patient declined at this time.    Ghazal Orona, LPN      "

## 2024-06-12 NOTE — PROGRESS NOTES
ASSESSMENT/PLAN:    Differential Diagnoses: ***    I have reviewed the nursing notes.  I have reviewed the findings, diagnosis, plan and need for follow up with the patient.    {Carleen Picklist:[id number]}    ***   Discussed with patient viral vs bacterial respiratory illness, and evidence based practice and guidelines for cough without fever or infiltrate on xray are not indicative of pneumonia and should not be treated with antibiotics.    *** Discussed with patient that symptoms and exam are consistent with viral illness.  Discussed that symptomatic treatment of cough is appropriate but not with antibiotics.      *** Symptomatic treatment - Encouraged fluids, salt water gargles, honey (only if greater than 1 year in age due to risk of botulism), elevation, humidifier, sinus rinse/netti pot, lozenges, tea, topical vapor rub, popsicles, rest, etc     *** May use over-the-counter Tylenol or ibuprofen PRN    Discussed warning signs/symptoms indicative of need to f/u    Follow up if symptoms persist or worsen or concerns    I explained my diagnostic considerations and recommendations to the patient, who voiced understanding and agreement with the treatment plan. All questions were answered. We discussed potential side effects of any prescribed or recommended therapies, as well as expectations for response to treatments.    Anaya Payton, TMIA CNP  6/12/2024  9:12 AM    HPI:    Anjel Payton is a 68 year old male who presents to [facility] today for concerns of sore on right foot that may possibly be infected.    Past Medical History:   Diagnosis Date    Acute gastric ulcer due to Helicobacter pylori     Essential (primary) hypertension     No Comments Provided    Lower urinary tract symptoms (LUTS)     Tubular adenoma      Past Surgical History:   Procedure Laterality Date    COLONOSCOPY      10/5/09    COLONOSCOPY N/A 07/16/2018    F/U  2023 tubular adenomas    COLONOSCOPY N/A 07/19/2023    tubular adenoma 5 yr follow up     ESOPHAGOSCOPY, GASTROSCOPY, DUODENOSCOPY (EGD), COMBINED N/A 2019    antral ulcer    HERNIA REPAIR, UMBILICAL  2009    LAPAROSCOPIC CHOLECYSTECTOMY  2017    RELEASE CARPAL TUNNEL Right      Social History     Tobacco Use    Smoking status: Former     Current packs/day: 0.00     Types: Cigarettes     Quit date: 10/25/1975     Years since quittin.6    Smokeless tobacco: Former     Quit date: 10/25/1998   Substance Use Topics    Alcohol use: Yes     Alcohol/week: 0.8 standard drinks of alcohol     Comment: 1 weekly      Current Outpatient Medications   Medication Sig Dispense Refill    famotidine (PEPCID) 40 MG tablet Take 1 tablet (40 mg) by mouth daily 90 tablet 3    hydroCHLOROthiazide (HYDRODIURIL) 12.5 MG tablet TAKE 1 TABLET BY MOUTH EVERY DAY 90 tablet 4    LANsoprazole (PREVACID) 30 MG DR capsule Take 1 capsule (30 mg) by mouth daily 90 capsule 3    lisinopril (ZESTRIL) 20 MG tablet TAKE 1 TABLET BY MOUTH EVERY DAY 90 tablet 4    Multiple Vitamin (MULTI-VITAMINS) TABS Take 1 tablet by mouth daily       No Known Allergies  Past medical history, past surgical history, current medications and allergies reviewed and accurate to the best of my knowledge.      ROS:  Refer to HPI    There were no vitals taken for this visit.    EXAM:  General Appearance: Well appearing 68 year old male, appropriate appearance for age. No acute distress   Ears: Left TM intact, translucent with bony landmarks appreciated, no erythema, no effusion, no bulging, no purulence.  Right TM intact, translucent with bony landmarks appreciated, no erythema, no effusion, no bulging, no purulence.  Left auditory canal clear.  Right auditory canal clear.  Normal external ears, non tender.  Eyes: conjunctivae normal without erythema or irritation, corneas clear, no drainage or crusting, no eyelid swelling, pupils equal   Oropharynx: moist mucous membranes, posterior pharynx {w-w/o:5700} erythema, tonsils {ENT TONSILS:474318}, no  erythema, no exudates or petechiae, no post nasal drip seen, no trismus, voice clear.    Sinuses:  No sinus tenderness upon palpation of the frontal or maxillary sinuses  Nose:  Bilateral nares: no erythema, no edema, no drainage or congestion   Neck: supple without adenopathy  Respiratory: normal chest wall and respirations.  Normal effort.  Clear to auscultation bilaterally, no wheezing, crackles or rhonchi.  No increased work of breathing.  No cough appreciated.  Cardiac: RRR with no murmurs  Abdomen: soft, nontender, no rigidity, no rebound tenderness or guarding, normal bowel sounds present  :  No suprapubic tenderness to palpation.  {PRES/ABS:221032} CVA tenderness to palpation.    Musculoskeletal:  Equal movement of bilateral upper extremities.  Equal movement of bilateral lower extremities.  Normal gait.    Dermatological: no rashes noted of exposed skin  Neuro: Alert and oriented to person, place, and time.  Cranial nerves II-XII grossly intact with no focal or lateralizing deficits.  Muscle tone normal.  Gait normal. No tremor.   Psychological: normal affect, alert, oriented, and pleasant.     Labs:  ***    Xray:  ***

## 2024-06-12 NOTE — PROGRESS NOTES
ASSESSMENT/PLAN:    I have reviewed the nursing notes.  I have reviewed the findings, diagnosis, plan and need for follow up with the patient.    1. Benign essential hypertension    - Blood pressure was slightly elevated today in clinic after being checked a couple of times.  Patient states that he is taking his lisinopril and hydrochlorothiazide as prescribed.  Patient states that he had checked his blood pressure at home and was within normal range of 130 over 70s.    2. Pressure injury of deep tissue of other site  3. Pressure injury of right foot, stage 2 (H)  4. Right foot pain  5. Bunion, right    - XR Foot Right G/E 3 Views- no acute fracture or dislocation is identified.  No suspicious osseous lesion.  The joint spaces are preserved.  Hallux valgus suggested on these nonweightbearing films.  Moderate degenerative changes of the first MTP joint.  Well corticated ossicle medial to the head of the first metatarsal.  Calcaneal Achilles and plantar encephalopathy.  Cutaneous irregularity and soft tissue prominence lateral to the first MTP joint without underlying osseous changes per radiologist.    6. Infected abrasion of right foot, initial encounter    - CBC and Differential- unremarkable results    - Basic Metabolic Panel- creatinine 0.64, glucose 105.    - Hemoglobin A1c- 5.1    - cefdinir (OMNICEF) 300 MG capsule; Take 1 capsule (300 mg) by mouth 2 times daily for 5 days  Dispense: 10 capsule; Refill: 0    - May use over-the-counter Tylenol as needed for pain and inflammation.    - Discussed warning signs/symptoms indicative of need to f/u    - Follow up if symptoms persist or worsen or concerns    - I explained my diagnostic considerations and recommendations to the patient, who voiced understanding and agreement with the treatment plan. All questions were answered. We discussed potential side effects of any prescribed or recommended therapies, as well as expectations for response to treatments.    Anaya IVEY  FitoTIMA CNP  2024  9:12 AM    HPI:    Anjel Payton is a 68 year old male who presents to [facility] today for concerns of sore on right foot that may possibly be infected.  Patient states that he bought some new shoes, went for a walk, got about 2 miles then foot began to hurt so he went home.  Bunion on right foot, skin was rubbed off.  This occurred 12 days ago.  Patient states that he is concerned that possibly he has diabetes since the sore will not heal and is now appearing infected.  Patient saw ortho a couple years ago in regards to possible bunion surgery but decided not to have surgery due to his age and recovery time he states.  Denies fever.  States that he has been keeping it clean, applying bacitracin and new skin.    Past Medical History:   Diagnosis Date    Acute gastric ulcer due to Helicobacter pylori     Essential (primary) hypertension     No Comments Provided    Lower urinary tract symptoms (LUTS)     Tubular adenoma      Past Surgical History:   Procedure Laterality Date    COLONOSCOPY      10/5/09    COLONOSCOPY N/A 2018    F/U   tubular adenomas    COLONOSCOPY N/A 2023    tubular adenoma 5 yr follow up    ESOPHAGOSCOPY, GASTROSCOPY, DUODENOSCOPY (EGD), COMBINED N/A 2019    antral ulcer    HERNIA REPAIR, UMBILICAL  2009    LAPAROSCOPIC CHOLECYSTECTOMY  2017    RELEASE CARPAL TUNNEL Right      Social History     Tobacco Use    Smoking status: Former     Current packs/day: 0.00     Types: Cigarettes     Quit date: 10/25/1975     Years since quittin.6    Smokeless tobacco: Former     Quit date: 10/25/1998   Substance Use Topics    Alcohol use: Yes     Alcohol/week: 0.8 standard drinks of alcohol     Comment: 1 weekly      Current Outpatient Medications   Medication Sig Dispense Refill    cefdinir (OMNICEF) 300 MG capsule Take 1 capsule (300 mg) by mouth 2 times daily for 5 days 10 capsule 0    famotidine (PEPCID) 40 MG tablet Take 1 tablet (40 mg) by mouth  "daily 90 tablet 3    hydroCHLOROthiazide (HYDRODIURIL) 12.5 MG tablet TAKE 1 TABLET BY MOUTH EVERY DAY 90 tablet 4    LANsoprazole (PREVACID) 30 MG DR capsule Take 1 capsule (30 mg) by mouth daily 90 capsule 3    lisinopril (ZESTRIL) 20 MG tablet TAKE 1 TABLET BY MOUTH EVERY DAY 90 tablet 4    Multiple Vitamin (MULTI-VITAMINS) TABS Take 1 tablet by mouth daily       No Known Allergies  Past medical history, past surgical history, current medications and allergies reviewed and accurate to the best of my knowledge.      ROS:  Refer to HPI    BP (!) 148/80   Pulse 55   Temp 97.9  F (36.6  C) (Tympanic)   Resp 18   Ht 1.803 m (5' 11\")   Wt 78.5 kg (173 lb)   SpO2 97%   BMI 24.13 kg/m      EXAM:  General Appearance: Well appearing 68 year old male, appropriate appearance for age. No acute distress   Respiratory: normal chest wall and respirations.  Normal effort.  Clear to auscultation bilaterally, no wheezing, crackles or rhonchi.  No increased work of breathing.  No cough appreciated.  Cardiac: RRR with no murmurs  Musculoskeletal:  Equal movement of bilateral upper extremities.  Equal movement of bilateral lower extremities.  Normal gait.    Dermatological: See attached picture for details  Neuro: Alert and oriented to person, place, and time.    Psychological: normal affect, alert, oriented, and pleasant.         Labs:Xray:  Results for orders placed or performed in visit on 06/12/24   XR Foot Right G/E 3 Views     Status: None    Narrative    PROCEDURE:  XR FOOT RIGHT G/E 3 VIEWS    HISTORY: Infected abrasion of right foot, initial encounter; Infected  abrasion of right foot, initial encounter; Pressure injury of right  foot, stage 2 (H)    COMPARISON: Foot radiographs 12/15/2022    TECHNIQUE:  XR FOOT RIGHT 3 VIEWS    FINDINGS:   No acute fracture or dislocation is identified. No suspicious osseous  lesion. The joint spaces are preserved. Hallux valgus suggested on  these nonweightbearing films. Moderate " degenerative changes of the  first MTP joint. Well-corticated ossicle medial to the head of the  first metatarsal. Calcaneal Achilles and plantar enthesopathy.    No foreign body or subcutaneous emphysema. Cutaneous irregularity and  soft tissue prominence lateral to the first MTP joint without  underlying osseous changes.        Impression    IMPRESSION:   No acute osseous abnormality.    CHARLIE QUINTERO MD         SYSTEM ID:  [id number]   Basic Metabolic Panel     Status: Abnormal   Result Value Ref Range    Sodium 138 135 - 145 mmol/L    Potassium 4.6 3.4 - 5.3 mmol/L    Chloride 102 98 - 107 mmol/L    Carbon Dioxide (CO2) 29 22 - 29 mmol/L    Anion Gap 7 7 - 15 mmol/L    Urea Nitrogen 18.9 8.0 - 23.0 mg/dL    Creatinine 0.64 (L) 0.67 - 1.17 mg/dL    GFR Estimate >90 >60 mL/min/1.73m2    Calcium 10.0 8.8 - 10.2 mg/dL    Glucose 105 (H) 70 - 99 mg/dL   Hemoglobin A1c     Status: Normal   Result Value Ref Range    Hemoglobin A1C 5.1 4.0 - 6.2 %   CBC with platelets and differential     Status: None   Result Value Ref Range    WBC Count 4.1 4.0 - 11.0 10e3/uL    RBC Count 4.55 4.40 - 5.90 10e6/uL    Hemoglobin 13.6 13.3 - 17.7 g/dL    Hematocrit 41.7 40.0 - 53.0 %    MCV 92 78 - 100 fL    MCH 29.9 26.5 - 33.0 pg    MCHC 32.6 31.5 - 36.5 g/dL    RDW 13.6 10.0 - 15.0 %    Platelet Count 176 150 - 450 10e3/uL    % Neutrophils 39 %    % Lymphocytes 48 %    % Monocytes 12 %    % Eosinophils 1 %    % Basophils 1 %    % Immature Granulocytes 1 %    NRBCs per 100 WBC 0 <1 /100    Absolute Neutrophils 1.6 1.6 - 8.3 10e3/uL    Absolute Lymphocytes 2.0 0.8 - 5.3 10e3/uL    Absolute Monocytes 0.5 0.0 - 1.3 10e3/uL    Absolute Eosinophils 0.0 0.0 - 0.7 10e3/uL    Absolute Basophils 0.0 0.0 - 0.2 10e3/uL    Absolute Immature Granulocytes 0.0 <=0.4 10e3/uL    Absolute NRBCs 0.0 10e3/uL   CBC and Differential     Status: None    Narrative    The following orders were created for panel order CBC and Differential.  Procedure                                Abnormality         Status                     ---------                               -----------         ------                     CBC with platelets and d...[[id number]]                      Final result                 Please view results for these tests on the individual orders.

## 2024-06-12 NOTE — PROGRESS NOTES
ASSESSMENT/PLAN:    I have reviewed the nursing notes.  I have reviewed the findings, diagnosis, plan and need for follow up with the patient.    1. Benign essential hypertension    - Blood pressure was slightly elevated today in clinic after being checked a couple of times.  Patient states that he is taking his lisinopril and hydrochlorothiazide as prescribed.  Patient states that he had checked his blood pressure at home and was within normal range of 130 over 70s.    2. Pressure injury of deep tissue of other site  3. Pressure injury of right foot, stage 2 (H)  4. Right foot pain  5. Bunion, right    - XR Foot Right G/E 3 Views- no acute fracture or dislocation is identified.  No suspicious osseous lesion.  The joint spaces are preserved.  Hallux valgus suggested on these nonweightbearing films.  Moderate degenerative changes of the first MTP joint.  Well corticated ossicle medial to the head of the first metatarsal.  Calcaneal Achilles and plantar encephalopathy.  Cutaneous irregularity and soft tissue prominence lateral to the first MTP joint without underlying osseous changes per radiologist.    6. Infected abrasion of right foot, initial encounter    - CBC and Differential- unremarkable results    - Basic Metabolic Panel- creatinine 0.64, glucose 105.    - Hemoglobin A1c- 5.1    - cefdinir (OMNICEF) 300 MG capsule; Take 1 capsule (300 mg) by mouth 2 times daily for 5 days  Dispense: 10 capsule; Refill: 0    - May use over-the-counter Tylenol as needed for pain and inflammation.    - Discussed warning signs/symptoms indicative of need to f/u    - Follow up if symptoms persist or worsen or concerns    - I explained my diagnostic considerations and recommendations to the patient, who voiced understanding and agreement with the treatment plan. All questions were answered. We discussed potential side effects of any prescribed or recommended therapies, as well as expectations for response to treatments.    Anaya IVEY  FitoTIMA CNP  2024  9:12 AM    HPI:    Anjel Payton is a 68 year old male who presents to [facility] today for concerns of sore on right foot that may possibly be infected.  Patient states that he bought some new shoes, went for a walk, got about 2 miles then foot began to hurt so he went home.  Bunion on right foot, skin was rubbed off.  This occurred 12 days ago.  Patient states that he is concerned that possibly he has diabetes since the sore will not heal and is now appearing infected.  Patient saw ortho a couple years ago in regards to possible bunion surgery but decided not to have surgery due to his age and recovery time he states.  Denies fever.  States that he has been keeping it clean, applying bacitracin and new skin.    Past Medical History:   Diagnosis Date    Acute gastric ulcer due to Helicobacter pylori     Essential (primary) hypertension     No Comments Provided    Lower urinary tract symptoms (LUTS)     Tubular adenoma      Past Surgical History:   Procedure Laterality Date    COLONOSCOPY      10/5/09    COLONOSCOPY N/A 2018    F/U   tubular adenomas    COLONOSCOPY N/A 2023    tubular adenoma 5 yr follow up    ESOPHAGOSCOPY, GASTROSCOPY, DUODENOSCOPY (EGD), COMBINED N/A 2019    antral ulcer    HERNIA REPAIR, UMBILICAL  2009    LAPAROSCOPIC CHOLECYSTECTOMY  2017    RELEASE CARPAL TUNNEL Right      Social History     Tobacco Use    Smoking status: Former     Current packs/day: 0.00     Types: Cigarettes     Quit date: 10/25/1975     Years since quittin.6    Smokeless tobacco: Former     Quit date: 10/25/1998   Substance Use Topics    Alcohol use: Yes     Alcohol/week: 0.8 standard drinks of alcohol     Comment: 1 weekly      Current Outpatient Medications   Medication Sig Dispense Refill    famotidine (PEPCID) 40 MG tablet Take 1 tablet (40 mg) by mouth daily 90 tablet 3    hydroCHLOROthiazide (HYDRODIURIL) 12.5 MG tablet TAKE 1 TABLET BY MOUTH EVERY DAY 90  "tablet 4    LANsoprazole (PREVACID) 30 MG DR capsule Take 1 capsule (30 mg) by mouth daily 90 capsule 3    lisinopril (ZESTRIL) 20 MG tablet TAKE 1 TABLET BY MOUTH EVERY DAY 90 tablet 4    Multiple Vitamin (MULTI-VITAMINS) TABS Take 1 tablet by mouth daily       No Known Allergies  Past medical history, past surgical history, current medications and allergies reviewed and accurate to the best of my knowledge.      ROS:  Refer to HPI    BP (!) 142/70   Pulse 55   Temp 97.9  F (36.6  C) (Tympanic)   Resp 18   Ht 1.803 m (5' 11\")   Wt 78.5 kg (173 lb)   SpO2 97%   BMI 24.13 kg/m      EXAM:  General Appearance: Well appearing 68 year old male, appropriate appearance for age. No acute distress   Respiratory: normal chest wall and respirations.  Normal effort.  Clear to auscultation bilaterally, no wheezing, crackles or rhonchi.  No increased work of breathing.  No cough appreciated.  Cardiac: RRR with no murmurs  Musculoskeletal:  Equal movement of bilateral upper extremities.  Equal movement of bilateral lower extremities.  Normal gait.    Dermatological: See attached picture for details  Neuro: Alert and oriented to person, place, and time.    Psychological: normal affect, alert, oriented, and pleasant.         Labs:Xray:  Results for orders placed or performed in visit on 06/12/24   XR Foot Right G/E 3 Views     Status: None    Narrative    PROCEDURE:  XR FOOT RIGHT G/E 3 VIEWS    HISTORY: Infected abrasion of right foot, initial encounter; Infected  abrasion of right foot, initial encounter; Pressure injury of right  foot, stage 2 (H)    COMPARISON: Foot radiographs 12/15/2022    TECHNIQUE:  XR FOOT RIGHT 3 VIEWS    FINDINGS:   No acute fracture or dislocation is identified. No suspicious osseous  lesion. The joint spaces are preserved. Hallux valgus suggested on  these nonweightbearing films. Moderate degenerative changes of the  first MTP joint. Well-corticated ossicle medial to the head of the  first " metatarsal. Calcaneal Achilles and plantar enthesopathy.    No foreign body or subcutaneous emphysema. Cutaneous irregularity and  soft tissue prominence lateral to the first MTP joint without  underlying osseous changes.        Impression    IMPRESSION:   No acute osseous abnormality.    CHARLIE QUINTERO MD         SYSTEM ID:  [id number]   Basic Metabolic Panel     Status: Abnormal   Result Value Ref Range    Sodium 138 135 - 145 mmol/L    Potassium 4.6 3.4 - 5.3 mmol/L    Chloride 102 98 - 107 mmol/L    Carbon Dioxide (CO2) 29 22 - 29 mmol/L    Anion Gap 7 7 - 15 mmol/L    Urea Nitrogen 18.9 8.0 - 23.0 mg/dL    Creatinine 0.64 (L) 0.67 - 1.17 mg/dL    GFR Estimate >90 >60 mL/min/1.73m2    Calcium 10.0 8.8 - 10.2 mg/dL    Glucose 105 (H) 70 - 99 mg/dL   Hemoglobin A1c     Status: Normal   Result Value Ref Range    Hemoglobin A1C 5.1 4.0 - 6.2 %   CBC with platelets and differential     Status: None   Result Value Ref Range    WBC Count 4.1 4.0 - 11.0 10e3/uL    RBC Count 4.55 4.40 - 5.90 10e6/uL    Hemoglobin 13.6 13.3 - 17.7 g/dL    Hematocrit 41.7 40.0 - 53.0 %    MCV 92 78 - 100 fL    MCH 29.9 26.5 - 33.0 pg    MCHC 32.6 31.5 - 36.5 g/dL    RDW 13.6 10.0 - 15.0 %    Platelet Count 176 150 - 450 10e3/uL    % Neutrophils 39 %    % Lymphocytes 48 %    % Monocytes 12 %    % Eosinophils 1 %    % Basophils 1 %    % Immature Granulocytes 1 %    NRBCs per 100 WBC 0 <1 /100    Absolute Neutrophils 1.6 1.6 - 8.3 10e3/uL    Absolute Lymphocytes 2.0 0.8 - 5.3 10e3/uL    Absolute Monocytes 0.5 0.0 - 1.3 10e3/uL    Absolute Eosinophils 0.0 0.0 - 0.7 10e3/uL    Absolute Basophils 0.0 0.0 - 0.2 10e3/uL    Absolute Immature Granulocytes 0.0 <=0.4 10e3/uL    Absolute NRBCs 0.0 10e3/uL   CBC and Differential     Status: None    Narrative    The following orders were created for panel order CBC and Differential.  Procedure                               Abnormality         Status                     ---------                                -----------         ------                     CBC with platelets and d...[[id number]]                      Final result                 Please view results for these tests on the individual orders.

## 2024-11-11 RX ORDER — LISINOPRIL 20 MG/1
20 TABLET ORAL DAILY
Qty: 90 TABLET | Refills: 4 | Status: CANCELLED | OUTPATIENT
Start: 2024-11-11

## 2024-11-11 RX ORDER — LANSOPRAZOLE 30 MG/1
30 CAPSULE, DELAYED RELEASE ORAL DAILY
Qty: 90 CAPSULE | Refills: 4 | Status: CANCELLED | OUTPATIENT
Start: 2024-11-11

## 2024-11-11 RX ORDER — FAMOTIDINE 40 MG/1
40 TABLET, FILM COATED ORAL DAILY
Qty: 90 TABLET | Refills: 4 | Status: CANCELLED | OUTPATIENT
Start: 2024-11-11

## 2024-11-11 RX ORDER — HYDROCHLOROTHIAZIDE 12.5 MG/1
12.5 TABLET ORAL DAILY
Qty: 90 TABLET | Refills: 4 | Status: CANCELLED | OUTPATIENT
Start: 2024-11-11

## 2024-11-23 ENCOUNTER — MYC MEDICAL ADVICE (OUTPATIENT)
Dept: INTERNAL MEDICINE | Facility: OTHER | Age: 69
End: 2024-11-23
Payer: COMMERCIAL

## 2024-11-23 SDOH — HEALTH STABILITY: PHYSICAL HEALTH: ON AVERAGE, HOW MANY MINUTES DO YOU ENGAGE IN EXERCISE AT THIS LEVEL?: 110 MIN

## 2024-11-23 SDOH — HEALTH STABILITY: PHYSICAL HEALTH: ON AVERAGE, HOW MANY DAYS PER WEEK DO YOU ENGAGE IN MODERATE TO STRENUOUS EXERCISE (LIKE A BRISK WALK)?: 4 DAYS

## 2024-11-23 ASSESSMENT — SOCIAL DETERMINANTS OF HEALTH (SDOH): HOW OFTEN DO YOU GET TOGETHER WITH FRIENDS OR RELATIVES?: NEVER

## 2024-11-25 ENCOUNTER — OFFICE VISIT (OUTPATIENT)
Dept: INTERNAL MEDICINE | Facility: OTHER | Age: 69
End: 2024-11-25
Attending: STUDENT IN AN ORGANIZED HEALTH CARE EDUCATION/TRAINING PROGRAM
Payer: COMMERCIAL

## 2024-11-25 VITALS
TEMPERATURE: 97.8 F | HEIGHT: 70 IN | HEART RATE: 64 BPM | SYSTOLIC BLOOD PRESSURE: 128 MMHG | WEIGHT: 165.4 LBS | DIASTOLIC BLOOD PRESSURE: 70 MMHG | OXYGEN SATURATION: 96 % | RESPIRATION RATE: 14 BRPM | BODY MASS INDEX: 23.68 KG/M2

## 2024-11-25 DIAGNOSIS — Z12.5 ENCOUNTER FOR SCREENING FOR MALIGNANT NEOPLASM OF PROSTATE: ICD-10-CM

## 2024-11-25 DIAGNOSIS — F43.21 GRIEF: ICD-10-CM

## 2024-11-25 DIAGNOSIS — Z23 ENCOUNTER FOR IMMUNIZATION: ICD-10-CM

## 2024-11-25 DIAGNOSIS — Z13.220 SCREENING FOR HYPERLIPIDEMIA: ICD-10-CM

## 2024-11-25 DIAGNOSIS — I10 BENIGN ESSENTIAL HYPERTENSION: ICD-10-CM

## 2024-11-25 DIAGNOSIS — M21.611 BUNION, RIGHT: ICD-10-CM

## 2024-11-25 DIAGNOSIS — Z00.00 ENCOUNTER FOR MEDICARE ANNUAL WELLNESS EXAM: Primary | ICD-10-CM

## 2024-11-25 DIAGNOSIS — R73.9 ELEVATED RANDOM BLOOD GLUCOSE LEVEL: ICD-10-CM

## 2024-11-25 DIAGNOSIS — K29.50 CHRONIC GASTRITIS WITHOUT BLEEDING, UNSPECIFIED GASTRITIS TYPE: ICD-10-CM

## 2024-11-25 LAB
ALBUMIN SERPL BCG-MCNC: 4.7 G/DL (ref 3.5–5.2)
ALP SERPL-CCNC: 62 U/L (ref 40–150)
ALT SERPL W P-5'-P-CCNC: 28 U/L (ref 0–70)
ANION GAP SERPL CALCULATED.3IONS-SCNC: 8 MMOL/L (ref 7–15)
AST SERPL W P-5'-P-CCNC: 43 U/L (ref 0–45)
BILIRUB SERPL-MCNC: 0.7 MG/DL
BUN SERPL-MCNC: 16.8 MG/DL (ref 8–23)
CALCIUM SERPL-MCNC: 10.2 MG/DL (ref 8.8–10.4)
CHLORIDE SERPL-SCNC: 99 MMOL/L (ref 98–107)
CHOLEST SERPL-MCNC: 126 MG/DL
CREAT SERPL-MCNC: 0.73 MG/DL (ref 0.67–1.17)
EGFRCR SERPLBLD CKD-EPI 2021: >90 ML/MIN/1.73M2
ERYTHROCYTE [DISTWIDTH] IN BLOOD BY AUTOMATED COUNT: 13.3 % (ref 10–15)
EST. AVERAGE GLUCOSE BLD GHB EST-MCNC: 100 MG/DL
FASTING STATUS PATIENT QL REPORTED: YES
FASTING STATUS PATIENT QL REPORTED: YES
GLUCOSE SERPL-MCNC: 94 MG/DL (ref 70–99)
HBA1C MFR BLD: 5.1 %
HCO3 SERPL-SCNC: 30 MMOL/L (ref 22–29)
HCT VFR BLD AUTO: 43.5 % (ref 40–53)
HDLC SERPL-MCNC: 50 MG/DL
HGB BLD-MCNC: 14.4 G/DL (ref 13.3–17.7)
LDLC SERPL CALC-MCNC: 67 MG/DL
MCH RBC QN AUTO: 30.1 PG (ref 26.5–33)
MCHC RBC AUTO-ENTMCNC: 33.1 G/DL (ref 31.5–36.5)
MCV RBC AUTO: 91 FL (ref 78–100)
NONHDLC SERPL-MCNC: 76 MG/DL
PLATELET # BLD AUTO: 166 10E3/UL (ref 150–450)
POTASSIUM SERPL-SCNC: 4.1 MMOL/L (ref 3.4–5.3)
PROT SERPL-MCNC: 8 G/DL (ref 6.4–8.3)
PSA SERPL DL<=0.01 NG/ML-MCNC: 1.31 NG/ML (ref 0–6.5)
RBC # BLD AUTO: 4.79 10E6/UL (ref 4.4–5.9)
SODIUM SERPL-SCNC: 137 MMOL/L (ref 135–145)
TRIGL SERPL-MCNC: 43 MG/DL
WBC # BLD AUTO: 3.8 10E3/UL (ref 4–11)

## 2024-11-25 PROCEDURE — 85018 HEMOGLOBIN: CPT | Mod: ZL | Performed by: STUDENT IN AN ORGANIZED HEALTH CARE EDUCATION/TRAINING PROGRAM

## 2024-11-25 PROCEDURE — 85041 AUTOMATED RBC COUNT: CPT | Mod: ZL | Performed by: STUDENT IN AN ORGANIZED HEALTH CARE EDUCATION/TRAINING PROGRAM

## 2024-11-25 PROCEDURE — G0103 PSA SCREENING: HCPCS | Mod: ZL | Performed by: STUDENT IN AN ORGANIZED HEALTH CARE EDUCATION/TRAINING PROGRAM

## 2024-11-25 PROCEDURE — 82465 ASSAY BLD/SERUM CHOLESTEROL: CPT | Mod: ZL | Performed by: STUDENT IN AN ORGANIZED HEALTH CARE EDUCATION/TRAINING PROGRAM

## 2024-11-25 PROCEDURE — 36415 COLL VENOUS BLD VENIPUNCTURE: CPT | Mod: ZL | Performed by: STUDENT IN AN ORGANIZED HEALTH CARE EDUCATION/TRAINING PROGRAM

## 2024-11-25 PROCEDURE — 84155 ASSAY OF PROTEIN SERUM: CPT | Mod: ZL | Performed by: STUDENT IN AN ORGANIZED HEALTH CARE EDUCATION/TRAINING PROGRAM

## 2024-11-25 PROCEDURE — 82565 ASSAY OF CREATININE: CPT | Mod: ZL | Performed by: STUDENT IN AN ORGANIZED HEALTH CARE EDUCATION/TRAINING PROGRAM

## 2024-11-25 PROCEDURE — 83036 HEMOGLOBIN GLYCOSYLATED A1C: CPT | Mod: ZL | Performed by: STUDENT IN AN ORGANIZED HEALTH CARE EDUCATION/TRAINING PROGRAM

## 2024-11-25 PROCEDURE — 80061 LIPID PANEL: CPT | Mod: ZL | Performed by: STUDENT IN AN ORGANIZED HEALTH CARE EDUCATION/TRAINING PROGRAM

## 2024-11-25 RX ORDER — HYDROCHLOROTHIAZIDE 12.5 MG/1
12.5 TABLET ORAL DAILY
Qty: 90 TABLET | Refills: 4 | Status: SHIPPED | OUTPATIENT
Start: 2024-11-25

## 2024-11-25 RX ORDER — LISINOPRIL 20 MG/1
20 TABLET ORAL DAILY
Qty: 90 TABLET | Refills: 4 | Status: SHIPPED | OUTPATIENT
Start: 2024-11-25

## 2024-11-25 ASSESSMENT — PATIENT HEALTH QUESTIONNAIRE - PHQ9
10. IF YOU CHECKED OFF ANY PROBLEMS, HOW DIFFICULT HAVE THESE PROBLEMS MADE IT FOR YOU TO DO YOUR WORK, TAKE CARE OF THINGS AT HOME, OR GET ALONG WITH OTHER PEOPLE: SOMEWHAT DIFFICULT
SUM OF ALL RESPONSES TO PHQ QUESTIONS 1-9: 3
SUM OF ALL RESPONSES TO PHQ QUESTIONS 1-9: 3

## 2024-11-25 ASSESSMENT — PAIN SCALES - GENERAL: PAINLEVEL_OUTOF10: MILD PAIN (3)

## 2024-11-25 NOTE — PROGRESS NOTES
Preventive Care Visit  [facility] AND [facility]  Daryn Cisneros MD, Internal Medicine  Nov 25, 2024        Encounter for Medicare annual wellness exam    -Colon cancer screening done via colonoscopy on 7/19/23 (impression: tubular adenomas). Follow-up 5 years. Due 7/19/28.    -PSA lab work performed 3/20/23 (value of 1.49 micrograms/L).  Pt would like to proceed with PSA testing; lab ordered.    -Immunizations: Patient is due for the following: Covid. Declines.     -Derm: Does patient regularly see dermatologist? No.     -Referrals placed: Podiatry and Dermatology.    -Refills pended for requested medications.  -Labs pended.     Counseling  Appropriate preventive services were addressed with this patient via screening, questionnaire, or discussion as appropriate for fall prevention, nutrition, physical activity, Tobacco-use cessation, social engagement, weight loss and cognition.  Checklist reviewing preventive services available has been given to the patient.  Reviewed patient's diet, addressing concerns and/or questions.   Patient is at risk for social isolation and has been provided with information about the benefit of social connection.   He is at risk for psychosocial distress and has been provided with information to reduce risk.     No follow-ups on file.      Jimmie Hobbs is a 69 year old, presenting for the following:  Medicare Visit        11/25/2024    11:50 AM   Additional Questions   Roomed by SHERI Gates             Health Care Directive  Patient does not have a Health Care Directive: Discussed advance care planning with patient; information given to patient to review.        11/23/2024   General Health   How would you rate your overall physical health? Good   Feel stress (tense, anxious, or unable to sleep) To some extent      (!) STRESS CONCERN      11/23/2024   Nutrition   Diet: Vegetarian/vegan            11/23/2024   Exercise   Days per week of moderate/strenous exercise 4 days    Average minutes spent exercising at this level 110 min            11/23/2024   Social Factors   Frequency of gathering with friends or relatives Never   Worry food won't last until get money to buy more No   Food not last or not have enough money for food? No   Do you have housing? (Housing is defined as stable permanent housing and does not include staying ouside in a car, in a tent, in an abandoned building, in an overnight shelter, or couch-surfing.) Yes   Are you worried about losing your housing? No   Lack of transportation? No   Unable to get utilities (heat,electricity)? No      (!) SOCIAL CONNECTIONS CONCERN      11/25/2024   Fall Risk   Gait Speed Test Interpretation Less than or equal to 5.00 seconds - PASS              11/23/2024   Activities of Daily Living- Home Safety   Needs help with the following daily activites None of the above   Safety concerns in the home None of the above            11/23/2024   Dental   Dentist two times every year? Yes            11/23/2024   Hearing Screening   Hearing concerns? None of the above            11/23/2024   Driving Risk Screening   Patient/family members have concerns about driving No            11/23/2024   General Alertness/Fatigue Screening   Have you been more tired than usual lately? No            11/23/2024   Urinary Incontinence Screening   Bothered by leaking urine in past 6 months No            11/23/2024   TB Screening   Were you born outside of the [place]? No          Today's PHQ-9 Score:       11/25/2024    11:50 AM   PHQ-9 SCORE   PHQ-9 Total Score MyChart 3 (Minimal depression)   PHQ-9 Total Score 3        Patient-reported         11/23/2024   Substance Use   Alcohol more than 3/day or more than 7/wk No   Do you have a current opioid prescription? No   How severe/bad is pain from 1 to 10? 1/10   Do you use any other substances recreationally? No        Social History     Tobacco Use    Smoking status: Former     Current packs/day: 0.00     Types:  "Cigarettes     Quit date: 10/25/1975     Years since quittin.1    Smokeless tobacco: Former     Quit date: 10/25/1998   Vaping Use    Vaping status: Never Used   Substance Use Topics    Alcohol use: Yes     Alcohol/week: 0.8 standard drinks of alcohol     Comment: 1 weekly     Drug use: No     Comment: Drug use: No              Reviewed and updated as needed this visit by Provider                              Current providers sharing in care for this patient include:  Patient Care Team:  Daryn Cisneros MD as PCP - General (Internal Medicine)  Daryn Cisneros MD as Assigned PCP  Frank Delgado MD as Assigned Musculoskeletal Provider    The following health maintenance items are reviewed in Epic and correct as of today:  Health Maintenance   Topic Date Due    BMP  2025    MEDICARE ANNUAL WELLNESS VISIT  2025    FALL RISK ASSESSMENT  2025    GLUCOSE  2027    LIPID  2028    COLORECTAL CANCER SCREENING  2028    ADVANCE CARE PLANNING  2029    RSV VACCINE (1 - 1-dose 75+ series) 2029    DTAP/TDAP/TD IMMUNIZATION (3 - Td or Tdap) 09/15/2030    HEPATITIS C SCREENING  Completed    PHQ-2 (once per calendar year)  Completed    Pneumococcal Vaccine: 65+ Years  Completed    ZOSTER IMMUNIZATION  Completed    AORTIC ANEURYSM SCREENING (SYSTEM ASSIGNED)  Completed    HPV IMMUNIZATION  Aged Out    MENINGITIS IMMUNIZATION  Aged Out    RSV MONOCLONAL ANTIBODY  Aged Out    INFLUENZA VACCINE  Discontinued    COVID-19 Vaccine  Discontinued        Objective      Exam  /70   Pulse 64   Temp 97.8  F (36.6  C)   Resp 14   Ht 1.778 m (5' 10\")   Wt 75 kg (165 lb 6.4 oz)   SpO2 96%   BMI 23.73 kg/m               2024   Mini Cog   Clock Draw Score 2 Normal   3 Item Recall 2 objects recalled   Mini Cog Total Score 4                 Signed Electronically by: Daryn Cisneros MD    "

## 2024-11-25 NOTE — PROGRESS NOTES
"Mr. Payton is a 69 year old male who presents to the clinic for medicare wellness    HPI    His wife passed away at the end of August.   Doing OK  Getting exercise. Riding his bike both outdoors and at home in his basement.   Urinates quite a bit. Drinks a lot of water when he works out.     He has a spot on his foot.   Callous and a blood blister on bottom of his foot.   Has insoles.       Review of Systems     Reviewed and updated as needed this visit by Provider    Tobacco  Allergies  Meds  Problems  Med Hx  Surg Hx  Fam Hx           EXAM:   Vitals:    11/25/24 1152   BP: 128/70   Pulse: 64   Resp: 14   Temp: 97.8  F (36.6  C)   SpO2: 96%   Weight: 75 kg (165 lb 6.4 oz)   Height: 1.778 m (5' 10\")         BP Readings from Last 3 Encounters:   11/25/24 128/70   06/12/24 (!) 148/80   04/28/24 (!) 152/79      Wt Readings from Last 3 Encounters:   11/25/24 75 kg (165 lb 6.4 oz)   06/12/24 78.5 kg (173 lb)   04/28/24 79.4 kg (175 lb)      Estimated body mass index is 23.73 kg/m  as calculated from the following:    Height as of this encounter: 1.778 m (5' 10\").    Weight as of this encounter: 75 kg (165 lb 6.4 oz).     Physical Exam  Vitals and nursing note reviewed.   Constitutional:       General: He is not in acute distress.     Appearance: He is well-developed. He is not diaphoretic.   HENT:      Head: Normocephalic and atraumatic.      Right Ear: Tympanic membrane, ear canal and external ear normal.      Left Ear: Tympanic membrane, ear canal and external ear normal.      Mouth/Throat:      Mouth: Mucous membranes are moist.      Pharynx: Oropharynx is clear.   Eyes:      General: No scleral icterus.     Conjunctiva/sclera: Conjunctivae normal.   Cardiovascular:      Rate and Rhythm: Normal rate and regular rhythm.      Heart sounds: No murmur heard.  Pulmonary:      Effort: Pulmonary effort is normal.      Breath sounds: Normal breath sounds. No wheezing.   Abdominal:      Palpations: Abdomen is soft. There " is no mass.      Tenderness: There is no abdominal tenderness.   Musculoskeletal:         General: No deformity.      Cervical back: Neck supple.      Comments: Large bunion with preulcerative callus at the base of the right great toe.   Lymphadenopathy:      Cervical: No cervical adenopathy.   Skin:     General: Skin is warm and dry.      Coloration: Skin is not jaundiced.      Findings: No rash.   Neurological:      Mental Status: He is alert and oriented to person, place, and time. Mental status is at baseline.   Psychiatric:         Mood and Affect: Mood normal.         Behavior: Behavior normal.         Thought Content: Thought content normal.          INVESTIGATIONS:  - Labs reviewed in [facility]     ASSESSMENT AND PLAN:    ICD-10-CM    1. Encounter for Medicare annual wellness exam  Z00.00       2. Chronic gastritis without bleeding, unspecified gastritis type  K29.50       3. Benign essential hypertension  I10 Comprehensive metabolic panel     CBC with platelets     hydroCHLOROthiazide 12.5 MG tablet     lisinopril (ZESTRIL) 20 MG tablet     CBC with platelets     Comprehensive metabolic panel      4. Encounter for immunization  Z23       5. Screening for hyperlipidemia  Z13.220 Lipid Profile     Lipid Profile      6. Encounter for screening for malignant neoplasm of prostate  Z12.5 PSA Screen GH     PSA Screen GH      7. Elevated random blood glucose level  R73.9 Hemoglobin A1c     Hemoglobin A1c     CANCELED: Hemoglobin A1c      8. Bunion, right  M21.611 Orthopedic  Referral      9. Grief  F43.21           Assessment/Plan:     Medicare wellness exam: Updated patient's past medical history, surgical history, social history, and medications.  Age appropriate laboratory results were ordered as above.  Due for repeat colonoscopy in 2028.  He reports doing well despite the loss of his wife this fall.  Still exercising, only 1 drink of alcohol per week weight is appropriate.    Gastritis has resolved, no  longer using PPI or H2 blocker.  Movements medication list.    Hypertension: Blood pressure at goal.  Renew medications.      Electronically signed by:  Daryn Cisneros MD on 11/25/2024  Internal Medicine  [facility]  Answers submitted by the patient for this visit:  Patient Health Questionnaire (Submitted on 11/25/2024)  If you checked off any problems, how difficult have these problems made it for you to do your work, take care of things at home, or get along with other people?: Somewhat difficult  PHQ9 TOTAL SCORE: 3

## 2024-11-25 NOTE — LETTER
November 25, 2024      Anjel Payton  [address] [id number]        Dear ,    We are writing to inform you of your test results.    It was good to see you again today.  Your laboratory results are below.  You screened negative for prostate cancer.  Your blood counts are normal.  Your kidney function electrolytes are at your baseline.  Your hemoglobin A1c is 5.1 meaning that you do not have diabetes.  You are not prediabetic either.  Overall everything looks good.  Follow-up in 1 year for an annual wellness exam, sooner if needed.    Resulted Orders   PSA Screen GH   Result Value Ref Range    Prostate Specific Antigen Screen 1.31 0.00 - 6.50 ng/mL    Narrative    This result is obtained using the Roche Elecsys total PSA method on the luca e601 immunoassay analyzer, which is an ultrasensitive method. Results obtained with different assay methods or kits cannot be used interchangeably.  This test is intended for initial prostate cancer screening. PSA values exceeding the age-specific limits are suspicious for prostate disease, but additional testing, such as prostate biopsy, is needed to diagnose prostate pathology. The American Cancer Society recommends annual examination with digital rectal examination and serum PSA beginning at age 50 and for men with a life expectancy of at least 10 years after detection of prostate cancer. For men in high-risk groups, such as  Americans or men with a first-degree relative diagnosed at a younger age, testing should begin at a younger age. It is generally recommended that information be provided to patients about the benefits and limitations of testing and treatment so they can make informed decisions.   CBC with platelets   Result Value Ref Range    WBC Count 3.8 (L) 4.0 - 11.0 10e3/uL    RBC Count 4.79 4.40 - 5.90 10e6/uL    Hemoglobin 14.4 13.3 - 17.7 g/dL    Hematocrit 43.5 40.0 - 53.0 %    MCV 91 78 - 100 fL    MCH 30.1 26.5 - 33.0 pg    MCHC  33.1 31.5 - 36.5 g/dL    RDW 13.3 10.0 - 15.0 %    Platelet Count 166 150 - 450 10e3/uL   Comprehensive metabolic panel   Result Value Ref Range    Sodium 137 135 - 145 mmol/L    Potassium 4.1 3.4 - 5.3 mmol/L    Carbon Dioxide (CO2) 30 (H) 22 - 29 mmol/L    Anion Gap 8 7 - 15 mmol/L    Urea Nitrogen 16.8 8.0 - 23.0 mg/dL    Creatinine 0.73 0.67 - 1.17 mg/dL    GFR Estimate >90 >60 mL/min/1.73m2      Comment:      eGFR calculated using 2021 CKD-EPI equation.    Calcium 10.2 8.8 - 10.4 mg/dL      Comment:      Reference intervals for this test were updated on 7/16/2024 to reflect our healthy population more accurately. There may be differences in the flagging of prior results with similar values performed with this method. Those prior results can be interpreted in the context of the updated reference intervals.    Chloride 99 98 - 107 mmol/L    Glucose 94 70 - 99 mg/dL    Alkaline Phosphatase 62 40 - 150 U/L    AST 43 0 - 45 U/L    ALT 28 0 - 70 U/L    Protein Total 8.0 6.4 - 8.3 g/dL    Albumin 4.7 3.5 - 5.2 g/dL    Bilirubin Total 0.7 <=1.2 mg/dL    Patient Fasting > 8hrs? Yes    Lipid Profile   Result Value Ref Range    Cholesterol 126 <200 mg/dL    Triglycerides 43 <150 mg/dL    Direct Measure HDL 50 >=40 mg/dL    LDL Cholesterol Calculated 67 <100 mg/dL    Non HDL Cholesterol 76 <130 mg/dL    Patient Fasting > 8hrs? Yes     Narrative    Cholesterol  Desirable: < 200 mg/dL  Borderline High: 200 - 239 mg/dL  High: >= 240 mg/dL    Triglycerides  Normal: < 150 mg/dL  Borderline High: 150 - 199 mg/dL  High: 200-499 mg/dL  Very High: >= 500 mg/dL    Direct Measure HDL  Female: >= 50 mg/dL   Male: >= 40 mg/dL    LDL Cholesterol  Desirable: < 100 mg/dL  Above Desirable: 100 - 129 mg/dL   Borderline High: 130 - 159 mg/dL   High:  160 - 189 mg/dL   Very High: >= 190 mg/dL    Non HDL Cholesterol  Desirable: < 130 mg/dL  Above Desirable: 130 - 159 mg/dL  Borderline High: 160 - 189 mg/dL  High: 190 - 219 mg/dL  Very High: >=  220 mg/dL   Hemoglobin A1c   Result Value Ref Range    Estimated Average Glucose 100 <117 mg/dL    Hemoglobin A1C 5.1 <5.7 %      Comment:      Normal <5.7%   Prediabetes 5.7-6.4%    Diabetes 6.5% or higher     Note: Adopted from ADA consensus guidelines.       If you have any questions or concerns, please call the clinic at the number listed above.       Sincerely,      Daryn Cisneros MD

## 2024-11-25 NOTE — PATIENT INSTRUCTIONS
Patient Education   Preventive Care Advice   This is general advice given by our system to help you stay healthy. However, your care team may have specific advice just for you. Please talk to your care team about your preventive care needs.  Nutrition  Eat 5 or more servings of fruits and vegetables each day.  Try wheat bread, brown rice and whole grain pasta (instead of white bread, rice, and pasta).  Get enough calcium and vitamin D. Check the label on foods and aim for 100% of the RDA (recommended daily allowance).  Lifestyle  Exercise at least 150 minutes each week  (30 minutes a day, 5 days a week).  Do muscle strengthening activities 2 days a week. These help control your weight and prevent disease.  No smoking.  Wear sunscreen to prevent skin cancer.  Have a dental exam and cleaning every 6 months.  Yearly exams  See your health care team every year to talk about:  Any changes in your health.  Any medicines your care team has prescribed.  Preventive care, family planning, and ways to prevent chronic diseases.  Shots (vaccines)   HPV shots (up to age 26), if you've never had them before.  Hepatitis B shots (up to age 59), if you've never had them before.  COVID-19 shot: Get this shot when it's due.  Flu shot: Get a flu shot every year.  Tetanus shot: Get a tetanus shot every 10 years.  Pneumococcal, hepatitis A, and RSV shots: Ask your care team if you need these based on your risk.  Shingles shot (for age 50 and up)  General health tests  Diabetes screening:  Starting at age 35, Get screened for diabetes at least every 3 years.  If you are younger than age 35, ask your care team if you should be screened for diabetes.  Cholesterol test: At age 39, start having a cholesterol test every 5 years, or more often if advised.  Bone density scan (DEXA): At age 50, ask your care team if you should have this scan for osteoporosis (brittle bones).  Hepatitis C: Get tested at least once in your life.  STIs (sexually  transmitted infections)  Before age 24: Ask your care team if you should be screened for STIs.  After age 24: Get screened for STIs if you're at risk. You are at risk for STIs (including HIV) if:  You are sexually active with more than one person.  You don't use condoms every time.  You or a partner was diagnosed with a sexually transmitted infection.  If you are at risk for HIV, ask about PrEP medicine to prevent HIV.  Get tested for HIV at least once in your life, whether you are at risk for HIV or not.  Cancer screening tests  Cervical cancer screening: If you have a cervix, begin getting regular cervical cancer screening tests starting at age 21.  Breast cancer scan (mammogram): If you've ever had breasts, begin having regular mammograms starting at age 40. This is a scan to check for breast cancer.  Colon cancer screening: It is important to start screening for colon cancer at age 45.  Have a colonoscopy test every 10 years (or more often if you're at risk) Or, ask your provider about stool tests like a FIT test every year or Cologuard test every 3 years.  To learn more about your testing options, visit:   .  For help making a decision, visit:   https://bit.ly/xd98061.  Prostate cancer screening test: If you have a prostate, ask your care team if a prostate cancer screening test (PSA) at age 55 is right for you.  Lung cancer screening: If you are a current or former smoker ages 50 to 80, ask your care team if ongoing lung cancer screenings are right for you.  For informational purposes only. Not to replace the advice of your health care provider. Copyright   2023 [facility]. All rights reserved. Clinically reviewed by the [facility]. [employer] 261639 - REV 01/24.  Learning About Stress  What is stress?     Stress is your body's response to a hard situation. Your body can have a physical, emotional, or mental response. Stress is a fact of life for most people, and it  affects everyone differently. What causes stress for you may not be stressful for someone else.  A lot of things can cause stress. You may feel stress when you go on a job interview, take a test, or run a race. This kind of short-term stress is normal and even useful. It can help you if you need to work hard or react quickly. For example, stress can help you finish an important job on time.  Long-term stress is caused by ongoing stressful situations or events. Examples of long-term stress include long-term health problems, ongoing problems at work, or conflicts in your family. Long-term stress can harm your health.  How does stress affect your health?  When you are stressed, your body responds as though you are in danger. It makes hormones that speed up your heart, make you breathe faster, and give you a burst of energy. This is called the fight-or-flight stress response. If the stress is over quickly, your body goes back to normal and no harm is done.  But if stress happens too often or lasts too long, it can have bad effects. Long-term stress can make you more likely to get sick, and it can make symptoms of some diseases worse. If you tense up when you are stressed, you may develop neck, shoulder, or low back pain. Stress is linked to high blood pressure and heart disease.  Stress also harms your emotional health. It can make you yeh, tense, or depressed. Your relationships may suffer, and you may not do well at work or school.  What can you do to manage stress?  You can try these things to help manage stress:   Do something active. Exercise or activity can help reduce stress. Walking is a great way to get started. Even everyday activities such as housecleaning or yard work can help.  Try yoga or gurwinder chi. These techniques combine exercise and meditation. You may need some training at first to learn them.  Do something you enjoy. For example, listen to music or go to a movie. Practice your hobby or do volunteer  "work.  Meditate. This can help you relax, because you are not worrying about what happened before or what may happen in the future.  Do guided imagery. Imagine yourself in any setting that helps you feel calm. You can use online videos, books, or a teacher to guide you.  Do breathing exercises. For example:  From a standing position, bend forward from the waist with your knees slightly bent. Let your arms dangle close to the floor.  Breathe in slowly and deeply as you return to a standing position. Roll up slowly and lift your head last.  Hold your breath for just a few seconds in the standing position.  Breathe out slowly and bend forward from the waist.  Let your feelings out. Talk, laugh, cry, and express anger when you need to. Talking with supportive friends or family, a counselor, or a rain leader about your feelings is a healthy way to relieve stress. Avoid discussing your feelings with people who make you feel worse.  Write. It may help to write about things that are bothering you. This helps you find out how much stress you feel and what is causing it. When you know this, you can find better ways to cope.  What can you do to prevent stress?  You might try some of these things to help prevent stress:  Manage your time. This helps you find time to do the things you want and need to do.  Get enough sleep. Your body recovers from the stresses of the day while you are sleeping.  Get support. Your family, friends, and community can make a difference in how you experience stress.  Limit your news feed. Avoid or limit time on social media or news that may make you feel stressed.  Do something active. Exercise or activity can help reduce stress. Walking is a great way to get started.  Where can you learn more?  Go to https://www.[employer].net/patiented  Enter N032 in the search box to learn more about \"Learning About Stress.\"  Current as of: October 24, 2023  Content Version: 14.2 2024 [employer]. "   Care instructions adapted under license by your healthcare professional. If you have questions about a medical condition or this instruction, always ask your healthcare professional. [employer] disclaims any warranty or liability for your use of this information.

## 2024-12-10 ENCOUNTER — DOCUMENTATION ONLY (OUTPATIENT)
Dept: OTHER | Facility: CLINIC | Age: 69
End: 2024-12-10
Payer: COMMERCIAL

## 2025-01-08 ENCOUNTER — TRANSFERRED RECORDS (OUTPATIENT)
Dept: HEALTH INFORMATION MANAGEMENT | Facility: OTHER | Age: 70
End: 2025-01-08

## 2025-01-08 ENCOUNTER — OFFICE VISIT (OUTPATIENT)
Dept: FAMILY MEDICINE | Facility: OTHER | Age: 70
End: 2025-01-08
Attending: FAMILY MEDICINE
Payer: COMMERCIAL

## 2025-01-08 VITALS
OXYGEN SATURATION: 100 % | SYSTOLIC BLOOD PRESSURE: 122 MMHG | TEMPERATURE: 98 F | DIASTOLIC BLOOD PRESSURE: 70 MMHG | RESPIRATION RATE: 12 BRPM | BODY MASS INDEX: 24.38 KG/M2 | WEIGHT: 180 LBS | HEIGHT: 72 IN | HEART RATE: 69 BPM

## 2025-01-08 DIAGNOSIS — I10 PRIMARY HYPERTENSION: ICD-10-CM

## 2025-01-08 DIAGNOSIS — R00.2 PALPITATIONS: Primary | ICD-10-CM

## 2025-01-08 PROCEDURE — G0463 HOSPITAL OUTPT CLINIC VISIT: HCPCS

## 2025-01-08 ASSESSMENT — PAIN SCALES - GENERAL: PAINLEVEL_OUTOF10: NO PAIN (0)

## 2025-01-08 NOTE — NURSING NOTE
"Patient presents to clinic to establish care and discuss heart health. Patient states he has had some sensations in his heart - he feels like it skips a beat. Anjel denies any other symptoms with it.  Anjel did state that he had been taking in more caffeine than normal, and had been under the stress/grief of losing his wife this past year.    Chief Complaint   Patient presents with    Establish Care     Discuss heart health       FOOD SECURITY SCREENING QUESTIONS  Hunger Vital Signs:  Within the past 12 months we worried whether our food would run out before we got money to buy more. Never  Within the past 12 months the food we bought just didn't last and we didn't have money to get more. Never  Makayla Dearmon 1/8/2025 4:08 PM      Initial BP (!) 142/68 (BP Location: Right arm, Patient Position: Sitting, Cuff Size: Adult Regular)   Pulse 69   Temp 98  F (36.7  C) (Tympanic)   Resp 12   Ht 1.816 m (5' 11.5\")   Wt 81.6 kg (180 lb)   SpO2 100%   BMI 24.76 kg/m   Estimated body mass index is 24.76 kg/m  as calculated from the following:    Height as of this encounter: 1.816 m (5' 11.5\").    Weight as of this encounter: 81.6 kg (180 lb).  Medication Reconciliation: complete    Makayla Sander  "

## 2025-01-08 NOTE — PROGRESS NOTES
Assessment & Plan     Palpitations  Appears benign ectopic beats with potential PACs with negative cardiac workup in the past.  No other concerning symptoms.  Avoid excessive caffeine and continue to monitor the symptoms.  If escalating frequency or any other associated symptoms of shortness of breath, dizziness, or chest pain, then further workup would be necessary.  Otherwise follow-up later in the year for annual wellness visit and medication recheck.    Primary hypertension  Well-controlled on medications.  No changes with the lisinopril and hydrochlorothiazide.                No follow-ups on file.    Jimmie Hobbs is a 69 year old, presenting for the following health issues:  Establish Care (Discuss heart health)      1/8/2025     4:02 PM   Additional Questions   Roomed by paola bridges lpn     History of Present Illness       Vascular Disease:  He presents for follow up of vascular disease.     He never takes nitroglycerin. He is not taking daily aspirin.    He eats 2-3 servings of fruits and vegetables daily.He consumes 0 sweetened beverage(s) daily.He exercises with enough effort to increase his heart rate 60 or more minutes per day.  He exercises with enough effort to increase his heart rate 5 days per week.   He is taking medications regularly.     69-year-old male here for palpitations.  He does have underlying hypertension and takes lisinopril and hydrochlorothiazide.  That has been well-controlled.  He did have significant workup in the past for palpitations, including stress echocardiogram, EKGs, and Zio patch back in 2018.  That all checked out normal with no other concerning features.  He has had episodes where for a day or two, he will get occasional skipped beat.  This was occurring for a couple days prior to [place] and happened a little longer back in August 2024.  He was under a lot of stress as he lost his wife to leukemia back at that time.  He does not get any other associated symptoms  "of chest pain, dizziness, or dyspnea.  He just wanted to make sure there was no other concerns with the current symptoms.  No problems over the last 3 weeks.  He does exercise quite a bit with a lot of biking and when he gets his heart rate up into the 100s and up to 150, he never has any skipped beats or any other concerning symptoms.  He does notice a little bit more sensitivity with skipped beats if he has more caffeine, but tries to limit caffeine for the most part.  Stress can aggravate the symptoms as well.  No other complaints.  He is otherwise doing well.    I have reviewed the patient's medical history in detail and updated the computerized patient record.                    Objective    BP (!) 142/68 (BP Location: Right arm, Patient Position: Sitting, Cuff Size: Adult Regular)   Pulse 69   Temp 98  F (36.7  C) (Tympanic)   Resp 12   Ht 1.816 m (5' 11.5\")   Wt 81.6 kg (180 lb)   SpO2 100%   BMI 24.76 kg/m    Body mass index is 24.76 kg/m .  Physical Exam   GENERAL: alert and no distress  RESP: lungs clear to auscultation - no rales, rhonchi or wheezes  CV: regular rate and rhythm, normal S1 S2, no S3 or S4, no murmur, click or rub, no peripheral edema             Signed Electronically by: Jimy aRmirez MD    "

## 2025-01-22 ENCOUNTER — TRANSFERRED RECORDS (OUTPATIENT)
Dept: HEALTH INFORMATION MANAGEMENT | Facility: OTHER | Age: 70
End: 2025-01-22
Payer: COMMERCIAL

## 2025-01-28 ENCOUNTER — APPOINTMENT (OUTPATIENT)
Dept: GENERAL RADIOLOGY | Facility: OTHER | Age: 70
End: 2025-01-28
Attending: FAMILY MEDICINE
Payer: COMMERCIAL

## 2025-01-28 ENCOUNTER — HOSPITAL ENCOUNTER (EMERGENCY)
Facility: OTHER | Age: 70
Discharge: HOME | End: 2025-01-28
Attending: FAMILY MEDICINE
Payer: COMMERCIAL

## 2025-01-28 VITALS
OXYGEN SATURATION: 98 % | HEART RATE: 51 BPM | WEIGHT: 175 LBS | DIASTOLIC BLOOD PRESSURE: 73 MMHG | SYSTOLIC BLOOD PRESSURE: 126 MMHG | BODY MASS INDEX: 24.5 KG/M2 | HEIGHT: 71 IN | RESPIRATION RATE: 12 BRPM | TEMPERATURE: 97.8 F

## 2025-01-28 DIAGNOSIS — R00.2 PALPITATIONS: Primary | ICD-10-CM

## 2025-01-28 LAB
ANION GAP SERPL CALCULATED.3IONS-SCNC: 9 MMOL/L (ref 7–15)
ATRIAL RATE - MUSE: 60 BPM
BASOPHILS # BLD AUTO: 0 10E3/UL (ref 0–0.2)
BASOPHILS NFR BLD AUTO: 0 %
BUN SERPL-MCNC: 14.7 MG/DL (ref 8–23)
CALCIUM SERPL-MCNC: 10.1 MG/DL (ref 8.8–10.4)
CHLORIDE SERPL-SCNC: 102 MMOL/L (ref 98–107)
CREAT SERPL-MCNC: 0.71 MG/DL (ref 0.67–1.17)
DIASTOLIC BLOOD PRESSURE - MUSE: NORMAL MMHG
EGFRCR SERPLBLD CKD-EPI 2021: >90 ML/MIN/1.73M2
EOSINOPHIL # BLD AUTO: 0 10E3/UL (ref 0–0.7)
EOSINOPHIL NFR BLD AUTO: 1 %
ERYTHROCYTE [DISTWIDTH] IN BLOOD BY AUTOMATED COUNT: 13.2 % (ref 10–15)
GLUCOSE SERPL-MCNC: 102 MG/DL (ref 70–99)
HCO3 SERPL-SCNC: 30 MMOL/L (ref 22–29)
HCT VFR BLD AUTO: 42.9 % (ref 40–53)
HGB BLD-MCNC: 14.2 G/DL (ref 13.3–17.7)
HOLD SPECIMEN: NORMAL
IMM GRANULOCYTES # BLD: 0 10E3/UL
IMM GRANULOCYTES NFR BLD: 1 %
INTERPRETATION ECG - MUSE: NORMAL
LYMPHOCYTES # BLD AUTO: 1.8 10E3/UL (ref 0.8–5.3)
LYMPHOCYTES NFR BLD AUTO: 46 %
MCH RBC QN AUTO: 30.3 PG (ref 26.5–33)
MCHC RBC AUTO-ENTMCNC: 33.1 G/DL (ref 31.5–36.5)
MCV RBC AUTO: 92 FL (ref 78–100)
MONOCYTES # BLD AUTO: 0.5 10E3/UL (ref 0–1.3)
MONOCYTES NFR BLD AUTO: 13 %
NEUTROPHILS # BLD AUTO: 1.5 10E3/UL (ref 1.6–8.3)
NEUTROPHILS NFR BLD AUTO: 40 %
NRBC # BLD AUTO: 0 10E3/UL
NRBC BLD AUTO-RTO: 0 /100
P AXIS - MUSE: 51 DEGREES
PLATELET # BLD AUTO: 168 10E3/UL (ref 150–450)
POTASSIUM SERPL-SCNC: 4.1 MMOL/L (ref 3.4–5.3)
PR INTERVAL - MUSE: 172 MS
QRS DURATION - MUSE: 94 MS
QT - MUSE: 402 MS
QTC - MUSE: 402 MS
R AXIS - MUSE: 21 DEGREES
RBC # BLD AUTO: 4.68 10E6/UL (ref 4.4–5.9)
SODIUM SERPL-SCNC: 141 MMOL/L (ref 135–145)
SYSTOLIC BLOOD PRESSURE - MUSE: NORMAL MMHG
T AXIS - MUSE: 31 DEGREES
TROPONIN T SERPL HS-MCNC: 16 NG/L
VENTRICULAR RATE- MUSE: 60 BPM
WBC # BLD AUTO: 3.8 10E3/UL (ref 4–11)

## 2025-01-28 PROCEDURE — 36415 COLL VENOUS BLD VENIPUNCTURE: CPT | Performed by: FAMILY MEDICINE

## 2025-01-28 PROCEDURE — 71046 X-RAY EXAM CHEST 2 VIEWS: CPT

## 2025-01-28 PROCEDURE — 82374 ASSAY BLOOD CARBON DIOXIDE: CPT | Performed by: FAMILY MEDICINE

## 2025-01-28 PROCEDURE — 99285 EMERGENCY DEPT VISIT HI MDM: CPT | Mod: 25

## 2025-01-28 PROCEDURE — 93010 ELECTROCARDIOGRAM REPORT: CPT | Performed by: INTERNAL MEDICINE

## 2025-01-28 PROCEDURE — 85048 AUTOMATED LEUKOCYTE COUNT: CPT | Performed by: FAMILY MEDICINE

## 2025-01-28 PROCEDURE — 80048 BASIC METABOLIC PNL TOTAL CA: CPT | Performed by: FAMILY MEDICINE

## 2025-01-28 PROCEDURE — 84484 ASSAY OF TROPONIN QUANT: CPT | Performed by: FAMILY MEDICINE

## 2025-01-28 PROCEDURE — 250N000013 HC RX MED GY IP 250 OP 250 PS 637: Performed by: FAMILY MEDICINE

## 2025-01-28 PROCEDURE — 85018 HEMOGLOBIN: CPT | Performed by: FAMILY MEDICINE

## 2025-01-28 PROCEDURE — 85004 AUTOMATED DIFF WBC COUNT: CPT | Performed by: FAMILY MEDICINE

## 2025-01-28 PROCEDURE — 82310 ASSAY OF CALCIUM: CPT | Performed by: FAMILY MEDICINE

## 2025-01-28 PROCEDURE — 99284 EMERGENCY DEPT VISIT MOD MDM: CPT | Performed by: FAMILY MEDICINE

## 2025-01-28 PROCEDURE — 93005 ELECTROCARDIOGRAM TRACING: CPT

## 2025-01-28 RX ORDER — ASPIRIN 81 MG/1
324 TABLET, CHEWABLE ORAL ONCE
Status: COMPLETED | OUTPATIENT
Start: 2025-01-28 | End: 2025-01-28

## 2025-01-28 RX ADMIN — ASPIRIN 81 MG 324 MG: 81 TABLET ORAL at 11:44

## 2025-01-28 ASSESSMENT — COLUMBIA-SUICIDE SEVERITY RATING SCALE - C-SSRS
6. HAVE YOU EVER DONE ANYTHING, STARTED TO DO ANYTHING, OR PREPARED TO DO ANYTHING TO END YOUR LIFE?: NO
1. IN THE PAST MONTH, HAVE YOU WISHED YOU WERE DEAD OR WISHED YOU COULD GO TO SLEEP AND NOT WAKE UP?: NO
2. HAVE YOU ACTUALLY HAD ANY THOUGHTS OF KILLING YOURSELF IN THE PAST MONTH?: NO

## 2025-01-28 ASSESSMENT — ACTIVITIES OF DAILY LIVING (ADL): ADLS_ACUITY_SCORE: 41

## 2025-01-28 NOTE — ED PROVIDER NOTES
"[facility]  Emergency Department Visit Note    Palpitations and Chest Pain      History of Present Illness     HPI:  Anjel Payton is a 69 year old male presenting with palpitations for over 5 years now.  They cause a skipping beat sensation and make his pulse irregular for a short time.  He has had them checked out with a stress test and zio patch and told they were benign.  His wife passed this past August and he has felt them more prominently and he thought he hsould be checked out.  He has no chest pains or shortness of breath.  He exercises for 60 minutes at a time with his HR in the 150's without any symptoms and feels good with these work outs regularly.      Review of Systems:  10 point review of systems obtained and pertinent positive and negative findings noted in HPI. Review of systems otherwise negative.  Medications:  Reviewed in Epic Allergies:  Reviewed in Epic Problem List:  Reviewed in Epic   Past Medical History:  Reviewed in Epic Past Surgical History:  Reviewed in Epic Social History:  Reviewed in Epic       Physical Exam   Vital signs: BP (!) 147/117   Pulse 56   Temp 97.8  F (36.6  C) (Tympanic)   Resp 18   Ht 1.803 m (5' 11\")   Wt 79.4 kg (175 lb)   SpO2 (!) 55%   BMI 24.41 kg/m    Physical Exam  Constitutional:       General: He is not in acute distress.     Appearance: Normal appearance. He is not toxic-appearing.   HENT:      Head: Atraumatic.   Eyes:      General: No scleral icterus.     Conjunctiva/sclera: Conjunctivae normal.   Cardiovascular:      Rate and Rhythm: Normal rate.      Heart sounds: Normal heart sounds.   Pulmonary:      Effort: Pulmonary effort is normal. No respiratory distress.      Breath sounds: Normal breath sounds.   Abdominal:      Palpations: Abdomen is soft.      Tenderness: There is no abdominal tenderness.   Musculoskeletal:         General: No deformity.      Cervical back: Neck supple.   Skin:     General: Skin is warm. "   Neurological:      Mental Status: He is alert.           ED Course     ED Course as of 01/28/25 1224   Tue Jan 28, 2025   1134 EKG: shows 60 bpm, sinus rhythm with PAC's that are infrequent.  No appreciated acute ischemic findings.     Procedures                  Results for orders placed or performed during the hospital encounter of 01/28/25 (from the past 24 hours)   [place] Draw    Narrative    The following orders were created for panel order [place] Draw.  Procedure                               Abnormality         Status                     ---------                               -----------         ------                     Extra Blue Top Tube[[id number]]                              In process                 Extra Red Top Tube[[id number]]                               In process                 Extra Green Top (Lithium...[[id number]]                      In process                 Extra Purple Top Tube[[id number]]                            In process                 Extra Green Top (Lithium...[[id number]]                      In process                   Please view results for these tests on the individual orders.   CBC with platelets differential    Narrative    The following orders were created for panel order CBC with platelets differential.  Procedure                               Abnormality         Status                     ---------                               -----------         ------                     CBC with platelets and d...[[id number]]  Abnormal            Final result                 Please view results for these tests on the individual orders.   Basic metabolic panel   Result Value Ref Range    Sodium 141 135 - 145 mmol/L    Potassium 4.1 3.4 - 5.3 mmol/L    Chloride 102 98 - 107 mmol/L    Carbon Dioxide (CO2) 30 (H) 22 - 29 mmol/L    Anion Gap 9 7 - 15 mmol/L    Urea Nitrogen 14.7 8.0 - 23.0 mg/dL    Creatinine 0.71 0.67 - 1.17 mg/dL    GFR Estimate >90 >60 mL/min/1.73m2    Calcium  10.1 8.8 - 10.4 mg/dL    Glucose 102 (H) 70 - 99 mg/dL   Troponin T, High Sensitivity   Result Value Ref Range    Troponin T, High Sensitivity 16 <=22 ng/L   CBC with platelets and differential   Result Value Ref Range    WBC Count 3.8 (L) 4.0 - 11.0 10e3/uL    RBC Count 4.68 4.40 - 5.90 10e6/uL    Hemoglobin 14.2 13.3 - 17.7 g/dL    Hematocrit 42.9 40.0 - 53.0 %    MCV 92 78 - 100 fL    MCH 30.3 26.5 - 33.0 pg    MCHC 33.1 31.5 - 36.5 g/dL    RDW 13.2 10.0 - 15.0 %    Platelet Count 168 150 - 450 10e3/uL    % Neutrophils 40 %    % Lymphocytes 46 %    % Monocytes 13 %    % Eosinophils 1 %    % Basophils 0 %    % Immature Granulocytes 1 %    NRBCs per 100 WBC 0 <1 /100    Absolute Neutrophils 1.5 (L) 1.6 - 8.3 10e3/uL    Absolute Lymphocytes 1.8 0.8 - 5.3 10e3/uL    Absolute Monocytes 0.5 0.0 - 1.3 10e3/uL    Absolute Eosinophils 0.0 0.0 - 0.7 10e3/uL    Absolute Basophils 0.0 0.0 - 0.2 10e3/uL    Absolute Immature Granulocytes 0.0 <=0.4 10e3/uL    Absolute NRBCs 0.0 10e3/uL   XR Chest 2 Views    Narrative    PROCEDURE: XR CHEST 2 VIEWS 1/28/2025 11:50 AM    HISTORY: chest pain    COMPARISONS: 4/27/2023.    TECHNIQUE: 2 views.    FINDINGS: Heart and pulmonary vasculature are normal. Lungs are clear  and no pleural effusion is seen.    There is moderate degenerative change in the spine. There is a slight  right convex scoliosis. Surgical clips are seen in the upper abdomen.         Impression    IMPRESSION: No acute infiltrate.    SANTOS PIERRE MD         SYSTEM ID:  [id number]       Medications   aspirin (ASA) chewable tablet 324 mg (324 mg Oral $Given 1/28/25 5702)       Medical Decision Making     Anjel Payton is a 69 year old male presenting with chronic palpitations, PAC's on EKG today.  Discussed benign nature and his excellent exercise tolerance as a very good sign.  Continue to follow with PCP and Cardiology for these.  Patient given instructions on follow-up and warning signs for which to return to ED. All  questions were answered and the patient is comfortable with plan for discharge. The patient was discharged in stable condition or transferred in as stable condition as possible.    I have reviewed the patient's Medical Imaging and Medical Records.  I have reviewed the nursing notes.  I have reviewed the findings, diagnosis, plan and need for follow up with the patient.    Diagnosis & Disposition     Diagnosis:  1. Palpitations        Discharge disposition:  Discharged to home       Follow-up: As scheduled       [facility] Emergency Department  Aracelis Gong MD  Family Medicine     Aracelis Gong MD  01/28/25 6985

## 2025-01-28 NOTE — ED TRIAGE NOTES
"Pt arrives to ED alone via private vehicle. C/o Palpitations, and intermittent chest pain beginning yesterday. Pt has EKG on watch and results showed \"inconclusive\". Denies chest pain at this time. Reports no cardiac hx. BP (!) 147/117   Pulse 56   Temp 97.8  F (36.6  C) (Tympanic)   Resp 18   Ht 1.803 m (5' 11\")   Wt 79.4 kg (175 lb)   SpO2 (!) 55%   BMI 24.41 kg/m         Triage Assessment (Adult)       Row Name 01/28/25 1130          Triage Assessment    Airway WDL WDL        Respiratory WDL    Respiratory WDL WDL        Skin Circulation/Temperature WDL    Skin Circulation/Temperature WDL WDL        Cardiac WDL    Cardiac WDL X;chest pain;all        Chest Pain Assessment    Chest Pain Location midsternal  palpitations     Chest Pain Radiation back        Peripheral/Neurovascular WDL    Peripheral Neurovascular WDL WDL        Cognitive/Neuro/Behavioral WDL    Cognitive/Neuro/Behavioral WDL WDL                     "

## 2025-02-12 ENCOUNTER — TRANSFERRED RECORDS (OUTPATIENT)
Dept: HEALTH INFORMATION MANAGEMENT | Facility: OTHER | Age: 70
End: 2025-02-12
Payer: COMMERCIAL

## 2025-05-22 ENCOUNTER — HOSPITAL ENCOUNTER (EMERGENCY)
Facility: OTHER | Age: 70
Discharge: HOME | End: 2025-05-22
Payer: COMMERCIAL

## 2025-05-22 VITALS
BODY MASS INDEX: 24.5 KG/M2 | SYSTOLIC BLOOD PRESSURE: 136 MMHG | HEIGHT: 71 IN | TEMPERATURE: 97.3 F | RESPIRATION RATE: 18 BRPM | OXYGEN SATURATION: 100 % | WEIGHT: 175 LBS | DIASTOLIC BLOOD PRESSURE: 70 MMHG | HEART RATE: 60 BPM
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
## 2025-05-22 PROCEDURE — 250N000013 HC RX MED GY IP 250 OP 250 PS 637

## 2025-05-22 PROCEDURE — 99283 EMERGENCY DEPT VISIT LOW MDM: CPT

## 2025-05-22 RX ORDER — DOXYCYCLINE 100 MG/1
200 CAPSULE ORAL ONCE
Status: COMPLETED | OUTPATIENT
Start: 2025-05-22 | End: 2025-05-22

## 2025-05-22 RX ADMIN — DOXYCYCLINE 200 MG: 100 CAPSULE ORAL at 21:16

## 2025-05-22 ASSESSMENT — ACTIVITIES OF DAILY LIVING (ADL): ADLS_ACUITY_SCORE: 41

## 2025-05-22 ASSESSMENT — ENCOUNTER SYMPTOMS: WOUND: 1

## 2025-05-23 NOTE — ED PROVIDER NOTES
History     Chief Complaint   Patient presents with    Tick Removal     The history is provided by the patient.     Anjel Payton is a 69 year old male who presents to the ER today with concerns for an imbedded tick head in his back. He noticed the tick today, knows it has been there for at least 24 hours, perhaps longer. It was engorged. It was a deer tick. Brother in law removed the tick, he is concerned and here today because he feels the head is stuck in his skin.     Allergies:  No Known Allergies    Problem List:    Patient Active Problem List    Diagnosis Date Noted    Hypertension 2024     Priority: Medium     well controlled on Zestril 5 mgs,      Dog bite of index finger, initial encounter 2024     Priority: Medium    Upper back strain, initial encounter 2023     Priority: Medium    Acute gastric ulcer due to Helicobacter pylori      Priority: Medium    Gastroesophageal reflux disease with esophagitis 2019     Priority: Medium    Acute ulcer of  antrum 2019     Priority: Medium    Lower urinary tract symptoms (LUTS) 2018     Priority: Medium    H/O adenomatous polyp of colon 2018     Priority: Medium    Family history of colon cancer mom  62 2018     Priority: Medium    Benign essential hypertension 2018     Priority: Medium    Lumbago 2018     Priority: Medium     Overview:   with L5-S1 disc protrusion, moderate and asymmetric to the right with right S1   nerve rootlet displaced posteriorly      Vegetarian 2014     Priority: Medium        Past Medical History:    Past Medical History:   Diagnosis Date    Acute gastric ulcer due to Helicobacter pylori     Essential (primary) hypertension     Lower urinary tract symptoms (LUTS)     Tubular adenoma        Past Surgical History:    Past Surgical History:   Procedure Laterality Date    COLONOSCOPY      10/5/09    COLONOSCOPY N/A 2018    F/U   tubular adenomas    COLONOSCOPY  "N/A 2023    tubular adenoma 5 yr follow up    ESOPHAGOSCOPY, GASTROSCOPY, DUODENOSCOPY (EGD), COMBINED N/A 2019    antral ulcer    HERNIA REPAIR, UMBILICAL  2009    LAPAROSCOPIC CHOLECYSTECTOMY  2017    RELEASE CARPAL TUNNEL Right        Family History:    Family History   Problem Relation Age of Onset    Colon Cancer Mother          age 60 or 61    Diabetes Father         Diabetes, post renal transplant secondary to diabetic complications,    Hypertension Father     Heart Disease Father          of myocardial infarction at age 57    Breast Cancer Sister     Heart Disease Brother         With bicuspid aortic valve, status post aortic valve replacement    Kidney Cancer Daughter     Diabetes Daughter        Social History:  Marital Status:   [5]  Social History     Tobacco Use    Smoking status: Former     Current packs/day: 0.00     Types: Cigarettes     Quit date: 10/25/1975     Years since quittin.6    Smokeless tobacco: Former     Quit date: 10/25/1998   Vaping Use    Vaping status: Never Used   Substance Use Topics    Alcohol use: Yes     Alcohol/week: 0.8 standard drinks of alcohol     Comment: 1 weekly     Drug use: No     Comment: Drug use: No        Medications:    hydroCHLOROthiazide 12.5 MG tablet  lisinopril (ZESTRIL) 20 MG tablet  Multiple Vitamin (MULTI-VITAMINS) TABS          Review of Systems   Skin:  Positive for wound.   All other systems reviewed and are negative.  See HPI    Physical Exam   BP: 136/70  Pulse: 60  Temp: 97.3  F (36.3  C)  Resp: 18  Height: 180.3 cm (5' 11\")  Weight: 79.4 kg (175 lb)  SpO2: 100 %      Physical Exam  Vitals and nursing note reviewed.   HENT:      Nose: Nose normal.      Mouth/Throat:      Mouth: Mucous membranes are moist.   Cardiovascular:      Rate and Rhythm: Normal rate and regular rhythm.      Pulses: Normal pulses.   Pulmonary:      Effort: Pulmonary effort is normal.   Abdominal:      Palpations: Abdomen is soft. "   Musculoskeletal:      Cervical back: Neck supple.   Skin:     Capillary Refill: Capillary refill takes less than 2 seconds.      Findings: Wound present.      Comments: Small red area to mid thoracic back.  No erythremia migrans rash.    Neurological:      General: No focal deficit present.      Mental Status: He is alert.   Psychiatric:         Mood and Affect: Mood normal.         Behavior: Behavior normal.         ED Course         No results found for this or any previous visit (from the past 24 hours).    Medications   doxycycline hyclate (VIBRAMYCIN) capsule 200 mg (200 mg Oral $Given 5/22/25 2116)       Assessments & Plan (with Medical Decision Making)  Patient does appear to have a very small pinpoint black foreign body in his skin presumably part of the tick head, skin around slightly inflamed but does not appear infected or your erythema migrans.  I did attempt to remove the tick head with needle forceps without success, he did experience some mild bleeding from the area but denied any pain and he did tolerate this.  I did feel that the risks of further attempts to remove the tick had outweighed the benefit of attempting to remove it and I provided patient reassurance that his body will eventually expel this on its own.  Bandage was applied to the area.  Patient was unsure how long the tick had been attached to his body, may have been greater than for 24 hours.  Reports that it is a deer tick.  Was engorged.  He is given 200 mg of doxycycline one-time prophylactic dose of antibiotics here in the emergency department.  Wound care and signs of infection discussed with patient strict return precautions.  Patient is discharged home in stable condition verbal understanding of instructions were given.     I have reviewed the nursing notes.    I have reviewed the findings, diagnosis, plan and need for follow up with the patient.    Medical Decision Making  The patient's presentation was of straightforward  complexity (a clearly self-limited or minor problem).    The patient's evaluation involved:  history and exam without other MDM data elements    The patient's management necessitated moderate risk (prescription drug management including medications given in the ED).      Discharge Medication List as of 5/22/2025  9:17 PM          Final diagnoses:   Tick bite       5/22/2025   [facility] IslandGabby barnard, TIMA CNP  05/22/25 6487

## 2025-05-23 NOTE — ED TRIAGE NOTES
Pt found tick on his back this evening, his brother in law removed it and stated it was a deer tick, past of head is still in back, unable to remove in triage. Redness around spot.      Triage Assessment (Adult)       Row Name 05/22/25 2047          Triage Assessment    Airway WDL WDL        Respiratory WDL    Respiratory WDL WDL        Skin Circulation/Temperature WDL    Skin Circulation/Temperature WDL WDL        Cardiac WDL    Cardiac WDL WDL        Peripheral/Neurovascular WDL    Peripheral Neurovascular WDL WDL        Cognitive/Neuro/Behavioral WDL    Cognitive/Neuro/Behavioral WDL WDL

## 2025-05-23 NOTE — DISCHARGE INSTRUCTIONS
Watch area for infection: pain swelling drainage, fever.   Wash area soap and water, bacitracin to area, band aid.   Your body will naturally remove the small part of the tick head that is still in there.   Doxycycline given here for prophylaxis

## 2025-07-01 ENCOUNTER — OFFICE VISIT (OUTPATIENT)
Dept: FAMILY MEDICINE | Facility: OTHER | Age: 70
End: 2025-07-01
Attending: STUDENT IN AN ORGANIZED HEALTH CARE EDUCATION/TRAINING PROGRAM
Payer: COMMERCIAL

## 2025-07-01 ENCOUNTER — NURSE TRIAGE (OUTPATIENT)
Dept: FAMILY MEDICINE | Facility: OTHER | Age: 70
End: 2025-07-01
Payer: COMMERCIAL

## 2025-07-01 VITALS
TEMPERATURE: 97.8 F | RESPIRATION RATE: 17 BRPM | BODY MASS INDEX: 25.34 KG/M2 | DIASTOLIC BLOOD PRESSURE: 78 MMHG | HEIGHT: 71 IN | HEART RATE: 62 BPM | SYSTOLIC BLOOD PRESSURE: 138 MMHG | WEIGHT: 181 LBS | OXYGEN SATURATION: 96 %

## 2025-07-01 DIAGNOSIS — S30.860A TICK BITE OF BACK, INITIAL ENCOUNTER: Primary | ICD-10-CM
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
## 2025-07-01 PROCEDURE — G0463 HOSPITAL OUTPT CLINIC VISIT: HCPCS

## 2025-07-01 RX ORDER — DOXYCYCLINE 100 MG/1
200 CAPSULE ORAL ONCE
Qty: 2 CAPSULE | Refills: 0 | Status: SHIPPED | OUTPATIENT
Start: 2025-07-01 | End: 2025-07-01

## 2025-07-01 ASSESSMENT — ENCOUNTER SYMPTOMS
CARDIOVASCULAR NEGATIVE: 1
RESPIRATORY NEGATIVE: 1
FEVER: 0
FATIGUE: 0
WOUND: 1

## 2025-07-01 ASSESSMENT — PAIN SCALES - GENERAL: PAINLEVEL_OUTOF10: MILD PAIN (1)

## 2025-07-01 NOTE — PROGRESS NOTES
"Chief Complaint   Patient presents with    Insect Bites   Patient is here to be seen for a tick bite.    FOOD SECURITY SCREENING QUESTIONS  Hunger Vital Signs:  Within the past 12 months we worried whether our food would run out before we got money to buy more. Never  Within the past 12 months the food we bought just didn't last and we didn't have money to get more. Never  Michaela Hager 7/1/2025 3:40 PM      Initial /78 (BP Location: Right arm, Patient Position: Sitting, Cuff Size: Adult Regular)   Pulse 62   Temp 97.8  F (36.6  C) (Tympanic)   Resp 17   Ht 1.803 m (5' 11\")   Wt 82.1 kg (181 lb)   SpO2 96%   BMI 25.24 kg/m   Estimated body mass index is 25.24 kg/m  as calculated from the following:    Height as of this encounter: 1.803 m (5' 11\").    Weight as of this encounter: 82.1 kg (181 lb).  Medication Reconciliation: complete    Michaela Hager   "

## 2025-07-01 NOTE — TELEPHONE ENCOUNTER
"  S-(situation): Tick attached for 2 days.     B-(background): Patient removed the tick on Sunday    A-(assessment): Red around area the morning after patient removed it. See photo. Denies any other symptoms. Believes the head was still attached to the tick.     R-(recommendations): Recommend patient be seen today. No avalibility in clinic today. Patient will go to  for evaluation.       Reason for Disposition   Probable deer tick that was attached > 36 hours (or tick appears swollen, not flat)    Additional Information   Negative: Not a tick bite   Negative: Patient sounds very sick or weak to the triager   Negative: Fever or severe headache occurs, 2 to 14 days following the bite   Negative: Widespread rash occurs, 2 to 14 days following the bite   Negative: Can't remove live tick (after using Care Advice)   Negative: Fever and spreading red area or streak   Negative: Fever and area is very tender to touch   Negative: Red streak or red line and length > 2 inches (5 cm)   Negative: Red or very tender (to touch) area and started over 24 hours after the bite   Negative: Red ring or bull's-eye rash occurs around a deer tick bite    Answer Assessment - Initial Assessment Questions  1. ATTACHED:  \"Is the tick still on the skin?\"  (e.g., yes, no, unsure)      No.    2. ONSET - TICK STILL ATTACHED:  \"How long do you think the tick has been on your skin?\" (e.g., hours, days, unsure)  Note:  Is there a recent activity (camping, hiking) where the caller may have been exposed?      Patient think it was attached for 2 days.     3. ONSET - TICK NOT STILL ATTACHED: \"If the tick has been removed, how long do you think the tick was attached before you removed it?\" (e.g., 5 hours, 2 days). \"When was this?\"      2 days.     4. LOCATION: \"Where is the tick bite located?\" (e.g., arm, leg)      Middle/upper back.     5. TYPE of TICK: \"Is it a wood tick or a deer tick?\" (e.g., deer tick, wood tick; unsure)      Deer Tick    6. SIZE of " "TICK: \"How big is the tick?\" (e.g., size of poppy seed, apple seed, watermelon seed; unsure) Note: Deer ticks can be the size of a poppy seed (nymph) or an apple seed (adult).        Size of an poppy seed.     7. ENGORGED: \"Did the tick look flat or engorged (full, swollen)?\" (e.g., flat, engorged; unsure)      Reports it was flat. State it fell into the sink and it was moving. Thinks the head was attached.     8. OTHER SYMPTOMS: \"Do you have any other symptoms?\" (e.g., fever, rash, redness at bite area, red ring around bite)      Redness at bite area, bigger than a silver dollar. Denies red ring around bite.     9. PREGNANCY: \"Is there any chance you are pregnant?\" \"When was your last menstrual period?\"      Male.    Protocols used: Tick Bite-A-OH      Adair Wright RN on 7/1/2025 at 3:15 PM    "

## 2025-07-01 NOTE — PROGRESS NOTES
Anjel Payton  1955    ASSESSMENT/PLAN      1. Tick bite of back, initial encounter (Primary)  - doxycycline hyclate (VIBRAMYCIN) 100 MG capsule; Take 2 capsules (200 mg) by mouth once for 1 dose.  Dispense: 2 capsule; Refill: 0    - Doxycycline 200 mg once provided for tick-borne illness.   - Discussed signs and symptoms of tick-borne illness and when to follow up.   - Follow up as needed for new or worsening symptoms          *Explanation of diagnosis, treatment options and risk and benefits of medications reviewed with patient. Patient agrees with plan of care.  *All questions were answered.    *Red flags symptoms were discussed and patient was advised when they should return for reevaluation or for prompt emergency evaluation.   *Patient was given verbal and written instructions on plan of care. Instructions were printed or are available on [employer] on electronic AVS.   *We discussed potential side effects of any prescribed or recommended therapies, as well as expectations for response to treatments.  *Patient discharged in stable condition    Jonatan Thayer, SU, APRN, FNP-C  [facility]    SUBJECTIVE  CHIEF COMPLAINT/ REASON FOR VISIT  Patient presents with:  Insect Bites     HISTORY OF PRESENT ILLNESS  Anjel Payton is a pleasant 70 year old male presents to rapid clinic today with tick bite.  Patient states on Sunday he had an attached deer tick to the upper mid back.  Patient was able to remove it without any retained tick parts.  However, he is concerned about the redness surrounding the wound.  He denies any fever, joint pains, or fatigue.    History provided by patient      I have reviewed the nursing notes.  I have reviewed allergies, medication list, problem list, and past medical history.    REVIEW OF SYSTEMS  Review of Systems   Constitutional:  Negative for fatigue and fever.   HENT: Negative.     Respiratory: Negative.     Cardiovascular: Negative.    Skin:  Positive for  "wound.        VITAL SIGNS  Vitals:    07/01/25 1540   BP: 138/78   BP Location: Right arm   Patient Position: Sitting   Cuff Size: Adult Regular   Pulse: 62   Resp: 17   Temp: 97.8  F (36.6  C)   TempSrc: Tympanic   SpO2: 96%   Weight: 82.1 kg (181 lb)   Height: 1.803 m (5' 11\")      Body mass index is 25.24 kg/m .      OBJECTIVE  PHYSICAL EXAM  Physical Exam  Vitals and nursing note reviewed.   Constitutional:       General: He is not in acute distress.     Appearance: Normal appearance. He is normal weight. He is not ill-appearing, toxic-appearing or diaphoretic.   Cardiovascular:      Rate and Rhythm: Normal rate and regular rhythm.      Pulses: Normal pulses.      Heart sounds: Normal heart sounds.   Pulmonary:      Effort: Pulmonary effort is normal. No respiratory distress.      Breath sounds: Normal breath sounds. No wheezing or rhonchi.   Skin:     General: Skin is warm.      Capillary Refill: Capillary refill takes less than 2 seconds.      Findings: Wound present.      Comments: Small tick bite wound to mid upper back without retained tick parts. Mild surrounding erythema. No erythema migrans.    Neurological:      Mental Status: He is alert.            DIAGNOSTICS  No results found for any visits on 07/01/25.   "

## 2025-07-01 NOTE — TELEPHONE ENCOUNTER
Tick bite.           Turned [employer] message into traige encounter.      Routed to Unit 1 Care Team RN for triage pool.    Updated patient on [employer].     Dolores Kirk RN on 7/1/2025 at 2:57 PM

## (undated) DEVICE — ENDO BRUSH CHANNEL MASTER CLEANING 2-4.2MM BW-412T

## (undated) DEVICE — ENDO FORCEP ENDOJAW BIOPSY 2.8MMX230CM FB-220U

## (undated) DEVICE — ENDO KIT COMPLIANCE DYKENDOCMPLY

## (undated) DEVICE — ENDO TRAP POLYP E-TRAP 00711099

## (undated) DEVICE — SOL WATER 1500ML

## (undated) DEVICE — SUCTION MANIFOLD NEPTUNE 2 SYS 4 PORT 0702-020-000

## (undated) DEVICE — Device

## (undated) DEVICE — SYR 50ML LL W/O NDL 309653

## (undated) DEVICE — ENDO BITE BLOCK 60 MAXI LF 00712804

## (undated) DEVICE — ESU ENDO FORCEP BX HOT FD-210U

## (undated) DEVICE — TUBING SUCTION 10'X3/16" N510

## (undated) DEVICE — ESU GROUND PAD ADULT W/CORD E7507

## (undated) RX ORDER — ASPIRIN 81 MG/1
TABLET, CHEWABLE ORAL
Status: DISPENSED
Start: 2025-01-28

## (undated) RX ORDER — PROPOFOL 10 MG/ML
INJECTION, EMULSION INTRAVENOUS
Status: DISPENSED
Start: 2023-07-19

## (undated) RX ORDER — LIDOCAINE HYDROCHLORIDE 20 MG/ML
INJECTION, SOLUTION EPIDURAL; INFILTRATION; INTRACAUDAL; PERINEURAL
Status: DISPENSED
Start: 2018-07-16

## (undated) RX ORDER — PROPOFOL 10 MG/ML
INJECTION, EMULSION INTRAVENOUS
Status: DISPENSED
Start: 2018-07-16

## (undated) RX ORDER — LIDOCAINE HYDROCHLORIDE 20 MG/ML
INJECTION, SOLUTION EPIDURAL; INFILTRATION; INTRACAUDAL; PERINEURAL
Status: DISPENSED
Start: 2019-04-29

## (undated) RX ORDER — PROPOFOL 10 MG/ML
INJECTION, EMULSION INTRAVENOUS
Status: DISPENSED
Start: 2019-04-29

## (undated) RX ORDER — DOXYCYCLINE 100 MG/1
CAPSULE ORAL
Status: DISPENSED
Start: 2025-05-22